# Patient Record
Sex: FEMALE | Race: BLACK OR AFRICAN AMERICAN | NOT HISPANIC OR LATINO | Employment: FULL TIME | URBAN - METROPOLITAN AREA
[De-identification: names, ages, dates, MRNs, and addresses within clinical notes are randomized per-mention and may not be internally consistent; named-entity substitution may affect disease eponyms.]

---

## 2018-02-14 ENCOUNTER — OFFICE VISIT (OUTPATIENT)
Dept: FAMILY MEDICINE | Facility: CLINIC | Age: 68
End: 2018-02-14
Payer: MEDICARE

## 2018-02-14 VITALS
SYSTOLIC BLOOD PRESSURE: 120 MMHG | HEIGHT: 67 IN | TEMPERATURE: 99 F | OXYGEN SATURATION: 95 % | BODY MASS INDEX: 24.43 KG/M2 | WEIGHT: 155.63 LBS | HEART RATE: 77 BPM | DIASTOLIC BLOOD PRESSURE: 80 MMHG

## 2018-02-14 DIAGNOSIS — J40 BRONCHITIS: Primary | ICD-10-CM

## 2018-02-14 PROCEDURE — 3008F BODY MASS INDEX DOCD: CPT | Mod: S$GLB,,, | Performed by: NURSE PRACTITIONER

## 2018-02-14 PROCEDURE — 99203 OFFICE O/P NEW LOW 30 MIN: CPT | Mod: S$GLB,,, | Performed by: NURSE PRACTITIONER

## 2018-02-14 RX ORDER — BUDESONIDE AND FORMOTEROL FUMARATE DIHYDRATE 80; 4.5 UG/1; UG/1
2 AEROSOL RESPIRATORY (INHALATION) 2 TIMES DAILY
Qty: 10.2 G | Refills: 0 | Status: SHIPPED | OUTPATIENT
Start: 2018-02-14 | End: 2018-07-03 | Stop reason: SDUPTHER

## 2018-02-14 RX ORDER — AZITHROMYCIN 250 MG/1
TABLET, FILM COATED ORAL
Qty: 6 TABLET | Refills: 0 | Status: SHIPPED | OUTPATIENT
Start: 2018-02-14 | End: 2018-02-19

## 2018-02-14 RX ORDER — ALBUTEROL SULFATE 90 UG/1
2 AEROSOL, METERED RESPIRATORY (INHALATION) EVERY 6 HOURS PRN
Qty: 18 G | Refills: 5 | Status: SHIPPED | OUTPATIENT
Start: 2018-02-14 | End: 2018-07-03 | Stop reason: SDUPTHER

## 2018-02-14 RX ORDER — PREDNISONE 20 MG/1
20 TABLET ORAL DAILY
Qty: 5 TABLET | Refills: 0 | Status: SHIPPED | OUTPATIENT
Start: 2018-02-14 | End: 2018-02-24

## 2018-02-14 NOTE — PROGRESS NOTES
Subjective:       Patient ID: Svetlana Beck is a 67 y.o. female.    Chief Complaint: Cough and Shortness of Breath    Cough   This is a new problem. The current episode started 1 to 4 weeks ago. The problem has been unchanged. The problem occurs every few minutes. Associated symptoms include shortness of breath and wheezing. Pertinent negatives include no chest pain, chills, ear congestion, ear pain, fever, headaches, heartburn, hemoptysis, myalgias, nasal congestion, postnasal drip, rash, rhinorrhea, sore throat, sweats or weight loss. Treatments tried: proair. The treatment provided no relief. Her past medical history is significant for COPD. There is no history of asthma, bronchiectasis, bronchitis, emphysema, environmental allergies or pneumonia.     Review of Systems   Constitutional: Negative for chills, diaphoresis, fatigue, fever and weight loss.   HENT: Negative for congestion, ear pain, postnasal drip, rhinorrhea and sore throat.    Respiratory: Positive for cough, shortness of breath and wheezing. Negative for hemoptysis and chest tightness.    Cardiovascular: Negative for chest pain, palpitations and leg swelling.   Gastrointestinal: Negative for heartburn.   Musculoskeletal: Negative for myalgias.   Skin: Negative for rash.   Allergic/Immunologic: Negative for environmental allergies.   Neurological: Negative for headaches.       Objective:      Physical Exam   Constitutional: She appears well-developed and well-nourished. No distress.   Cardiovascular: Normal rate, regular rhythm and normal heart sounds.    Pulmonary/Chest: Effort normal. She has wheezes in the right upper field, the right middle field, the right lower field, the left upper field, the left middle field and the left lower field. She has rhonchi in the right upper field, the right middle field, the right lower field, the left upper field, the left middle field and the left lower field.   SOB   Skin: Skin is warm and dry. She is not  diaphoretic.   Psychiatric: She has a normal mood and affect. Her behavior is normal. Judgment and thought content normal.   Vitals reviewed.      Assessment:       1. Bronchitis        Plan:       Bronchitis  -     budesonide-formoterol 80-4.5 mcg (SYMBICORT) 80-4.5 mcg/actuation HFAA; Inhale 2 puffs into the lungs 2 (two) times daily. Controller  Dispense: 10.2 g; Refill: 0  -     albuterol (PROAIR HFA) 90 mcg/actuation inhaler; Inhale 2 puffs into the lungs every 6 (six) hours as needed for Wheezing.  Dispense: 18 g; Refill: 5  -     predniSONE (DELTASONE) 20 MG tablet; Take 1 tablet (20 mg total) by mouth once daily.  Dispense: 5 tablet; Refill: 0    Other orders  -     azithromycin (Z-TICO) 250 MG tablet; Take 2 tablets by mouth on day 1; Take 1 tablet by mouth on days 2-5  Dispense: 6 tablet; Refill: 0      mucinex

## 2018-02-15 ENCOUNTER — TELEPHONE (OUTPATIENT)
Dept: FAMILY MEDICINE | Facility: CLINIC | Age: 68
End: 2018-02-15

## 2018-02-15 NOTE — TELEPHONE ENCOUNTER
I looked at the medicine I sent. It was Proair. Ventolin is another name for Proair it is probably what the pharmacy had in stock

## 2018-02-15 NOTE — TELEPHONE ENCOUNTER
----- Message from Cecilia Justin sent at 2/14/2018  4:27 PM CST -----  Contact: The pt came back  Per the pt, the medication that you sent to the pharmacy was not the correct one.  The Ventolin HFA is what was sent to the pharmacy.  SHOULD HAVE been Proair HFA.  Please give her a call in the morning at [phone]

## 2018-07-03 ENCOUNTER — OFFICE VISIT (OUTPATIENT)
Dept: FAMILY MEDICINE | Facility: CLINIC | Age: 68
End: 2018-07-03
Payer: MEDICARE

## 2018-07-03 VITALS
HEART RATE: 63 BPM | SYSTOLIC BLOOD PRESSURE: 130 MMHG | BODY MASS INDEX: 23.17 KG/M2 | WEIGHT: 147.63 LBS | OXYGEN SATURATION: 97 % | TEMPERATURE: 98 F | HEIGHT: 67 IN | DIASTOLIC BLOOD PRESSURE: 80 MMHG

## 2018-07-03 DIAGNOSIS — J40 BRONCHITIS: ICD-10-CM

## 2018-07-03 DIAGNOSIS — J44.9 CHRONIC OBSTRUCTIVE PULMONARY DISEASE, UNSPECIFIED COPD TYPE: Primary | ICD-10-CM

## 2018-07-03 PROCEDURE — 96372 THER/PROPH/DIAG INJ SC/IM: CPT | Mod: S$GLB,,, | Performed by: FAMILY MEDICINE

## 2018-07-03 PROCEDURE — 99499 UNLISTED E&M SERVICE: CPT | Mod: S$GLB,,, | Performed by: FAMILY MEDICINE

## 2018-07-03 PROCEDURE — 99213 OFFICE O/P EST LOW 20 MIN: CPT | Mod: 25,S$GLB,, | Performed by: FAMILY MEDICINE

## 2018-07-03 PROCEDURE — 3008F BODY MASS INDEX DOCD: CPT | Mod: CPTII,S$GLB,, | Performed by: FAMILY MEDICINE

## 2018-07-03 RX ORDER — ALBUTEROL SULFATE 90 UG/1
2 AEROSOL, METERED RESPIRATORY (INHALATION) EVERY 6 HOURS PRN
Qty: 18 G | Refills: 5 | Status: SHIPPED | OUTPATIENT
Start: 2018-07-03 | End: 2018-12-05 | Stop reason: SDUPTHER

## 2018-07-03 RX ORDER — BETAMETHASONE SODIUM PHOSPHATE AND BETAMETHASONE ACETATE 3; 3 MG/ML; MG/ML
6 INJECTION, SUSPENSION INTRA-ARTICULAR; INTRALESIONAL; INTRAMUSCULAR; SOFT TISSUE ONCE
Status: COMPLETED | OUTPATIENT
Start: 2018-07-03 | End: 2018-07-03

## 2018-07-03 RX ORDER — BUDESONIDE AND FORMOTEROL FUMARATE DIHYDRATE 80; 4.5 UG/1; UG/1
AEROSOL RESPIRATORY (INHALATION)
Qty: 6.9 G | Refills: 0 | Status: SHIPPED | OUTPATIENT
Start: 2018-07-03 | End: 2018-07-03 | Stop reason: SDUPTHER

## 2018-07-03 RX ORDER — DEXAMETHASONE SODIUM PHOSPHATE 4 MG/ML
4 INJECTION, SOLUTION INTRA-ARTICULAR; INTRALESIONAL; INTRAMUSCULAR; INTRAVENOUS; SOFT TISSUE
Status: DISCONTINUED | OUTPATIENT
Start: 2018-07-03 | End: 2018-07-03

## 2018-07-03 RX ORDER — BUDESONIDE AND FORMOTEROL FUMARATE DIHYDRATE 80; 4.5 UG/1; UG/1
2 AEROSOL RESPIRATORY (INHALATION) 2 TIMES DAILY
Qty: 6.9 G | Refills: 5 | Status: SHIPPED | OUTPATIENT
Start: 2018-07-03 | End: 2018-12-05 | Stop reason: SDUPTHER

## 2018-07-03 RX ADMIN — BETAMETHASONE SODIUM PHOSPHATE AND BETAMETHASONE ACETATE 6 MG: 3; 3 INJECTION, SUSPENSION INTRA-ARTICULAR; INTRALESIONAL; INTRAMUSCULAR; SOFT TISSUE at 12:07

## 2018-07-03 NOTE — PROGRESS NOTES
Subjective:       Patient ID: Svetlana Beck is a 67 y.o. female.    Chief Complaint:   Chief Complaint   Patient presents with    Shortness of Breath     pt stated she has been without her inhaler for about six days.        Shortness of Breath    This is a chronic problem. The current episode started in the past 7 days. The problem occurs constantly. The problem has been gradually worsening. Duration:  Ran out of her inhalers 2 days ago.  Pertinent negatives include no abdominal pain, chest pain, ear pain, headaches, leg swelling, neck pain, rash, rhinorrhea, sore throat, vomiting or wheezing. The symptoms are aggravated by exercise and fumes. Risk factors include smoking. She has tried beta agonist inhalers and steroid inhalers for the symptoms. The treatment provided significant relief. Her past medical history is significant for chronic lung disease and COPD. There is no history of allergies or aspirin allergies.     Review of Systems   Constitutional: Negative for activity change, appetite change, chills and fatigue.   HENT: Negative for congestion, ear discharge, ear pain, rhinorrhea, sinus pain, sore throat and trouble swallowing.    Eyes: Negative for photophobia, pain, redness, itching and visual disturbance.   Respiratory: Positive for shortness of breath. Negative for cough, chest tightness and wheezing.    Cardiovascular: Negative for chest pain, palpitations and leg swelling.   Gastrointestinal: Negative for abdominal distention, abdominal pain, blood in stool, diarrhea, nausea and vomiting.   Genitourinary: Negative for dysuria, pelvic pain, vaginal bleeding, vaginal discharge and vaginal pain.   Musculoskeletal: Negative for arthralgias, back pain, gait problem and neck pain.   Skin: Negative for color change, pallor and rash.   Neurological: Negative for dizziness, tremors, weakness, light-headedness, numbness and headaches.   Psychiatric/Behavioral: Negative for agitation, behavioral problems,  confusion and sleep disturbance.       Past Medical History:   Diagnosis Date    Asthma     COPD (chronic obstructive pulmonary disease)      Social History     Social History    Marital status: Single     Spouse name: N/A    Number of children: N/A    Years of education: N/A     Occupational History    Not on file.     Social History Main Topics    Smoking status: Current Every Day Smoker    Smokeless tobacco: Never Used    Alcohol use Yes    Drug use: Unknown    Sexual activity: Not on file     Other Topics Concern    Not on file     Social History Narrative    No narrative on file       Objective:      Physical Exam   Constitutional: She appears well-developed and well-nourished.   HENT:   Head: Normocephalic.   Right Ear: External ear normal.   Left Ear: External ear normal.   Mouth/Throat: Oropharynx is clear and moist.   Eyes: Conjunctivae and EOM are normal. Pupils are equal, round, and reactive to light.   Neck: Normal range of motion. Neck supple.   Cardiovascular: Normal rate, regular rhythm, normal heart sounds and intact distal pulses.    Pulmonary/Chest: Effort normal. She has decreased breath sounds. She has wheezes in the left lower field.   Neurological: She is alert.   Skin: Skin is warm and dry.   Psychiatric: Her behavior is normal. Judgment normal.       ASSESSMENT and PLAN    Chronic obstructive pulmonary disease, unspecified COPD type  -     Ambulatory referral to Smoking Cessation Program  -     betamethasone acetate-betamethasone sodium phosphate injection 6 mg; Inject 1 mL (6 mg total) into the muscle once.    Bronchitis  -     budesonide-formoterol 80-4.5 mcg (SYMBICORT) 80-4.5 mcg/actuation HFAA; Inhale 2 puffs into the lungs 2 (two) times daily. Controller  Dispense: 6.9 g; Refill: 5  -     albuterol (PROAIR HFA) 90 mcg/actuation inhaler; Inhale 2 puffs into the lungs every 6 (six) hours as needed for Wheezing.  Dispense: 18 g; Refill: 5  -     Ambulatory referral to Smoking  Cessation Program    Other orders  -     Discontinue: dexamethasone injection 4 mg; Inject 1 mL (4 mg total) into the vein one time.

## 2018-07-09 ENCOUNTER — OFFICE VISIT (OUTPATIENT)
Dept: FAMILY MEDICINE | Facility: CLINIC | Age: 68
End: 2018-07-09
Payer: MEDICARE

## 2018-07-09 VITALS
OXYGEN SATURATION: 96 % | HEART RATE: 69 BPM | HEIGHT: 67 IN | WEIGHT: 146.19 LBS | DIASTOLIC BLOOD PRESSURE: 84 MMHG | TEMPERATURE: 99 F | SYSTOLIC BLOOD PRESSURE: 132 MMHG | BODY MASS INDEX: 22.94 KG/M2

## 2018-07-09 DIAGNOSIS — J40 BRONCHITIS: ICD-10-CM

## 2018-07-09 DIAGNOSIS — R05.9 COUGH: ICD-10-CM

## 2018-07-09 DIAGNOSIS — J44.9 CHRONIC OBSTRUCTIVE PULMONARY DISEASE, UNSPECIFIED COPD TYPE: Primary | ICD-10-CM

## 2018-07-09 PROCEDURE — 94640 AIRWAY INHALATION TREATMENT: CPT | Mod: S$GLB,,, | Performed by: FAMILY MEDICINE

## 2018-07-09 PROCEDURE — 3008F BODY MASS INDEX DOCD: CPT | Mod: CPTII,S$GLB,, | Performed by: FAMILY MEDICINE

## 2018-07-09 PROCEDURE — 99213 OFFICE O/P EST LOW 20 MIN: CPT | Mod: 25,S$GLB,, | Performed by: FAMILY MEDICINE

## 2018-07-09 RX ORDER — LEVOFLOXACIN 750 MG/1
750 TABLET ORAL DAILY
Qty: 5 TABLET | Refills: 0 | Status: SHIPPED | OUTPATIENT
Start: 2018-07-09 | End: 2018-07-13 | Stop reason: ALTCHOICE

## 2018-07-09 RX ORDER — IPRATROPIUM BROMIDE AND ALBUTEROL SULFATE 2.5; .5 MG/3ML; MG/3ML
3 SOLUTION RESPIRATORY (INHALATION)
Status: DISCONTINUED | OUTPATIENT
Start: 2018-07-09 | End: 2018-07-09

## 2018-07-09 RX ORDER — ALBUTEROL SULFATE 0.83 MG/ML
2.5 SOLUTION RESPIRATORY (INHALATION)
Status: COMPLETED | OUTPATIENT
Start: 2018-07-09 | End: 2018-07-09

## 2018-07-09 RX ORDER — IPRATROPIUM BROMIDE 0.5 MG/2.5ML
0.5 SOLUTION RESPIRATORY (INHALATION)
Status: DISCONTINUED | OUTPATIENT
Start: 2018-07-09 | End: 2018-07-09

## 2018-07-09 RX ADMIN — IPRATROPIUM BROMIDE 0.5 MG: 0.5 SOLUTION RESPIRATORY (INHALATION) at 12:07

## 2018-07-09 RX ADMIN — ALBUTEROL SULFATE 2.5 MG: 0.83 SOLUTION RESPIRATORY (INHALATION) at 12:07

## 2018-07-09 NOTE — PROGRESS NOTES
Subjective:       Patient ID: Svetlana Beck is a 67 y.o. female.    Chief Complaint: No chief complaint on file.    Cough   This is a new problem. The current episode started 1 to 4 weeks ago. The problem has been unchanged (Was treated with inhalers, but has not improved. ). The problem occurs constantly (Unable to go to work. ). The cough is non-productive. Associated symptoms include nasal congestion and shortness of breath. Pertinent negatives include no chest pain, chills, ear pain, eye redness, headaches, rash, rhinorrhea, sore throat or wheezing. Nothing aggravates the symptoms. Risk factors for lung disease include smoking/tobacco exposure. She has tried a beta-agonist inhaler, steroid inhaler and oral steroids for the symptoms. The treatment provided no relief. Her past medical history is significant for COPD.     Review of Systems   Constitutional: Negative for activity change, appetite change, chills and fatigue.   HENT: Negative for congestion, ear discharge, ear pain, rhinorrhea, sinus pain, sore throat and trouble swallowing.    Eyes: Negative for photophobia, pain, redness, itching and visual disturbance.   Respiratory: Positive for cough and shortness of breath. Negative for chest tightness and wheezing.    Cardiovascular: Negative for chest pain, palpitations and leg swelling.   Gastrointestinal: Negative for abdominal distention, abdominal pain, blood in stool, diarrhea, nausea and vomiting.   Genitourinary: Negative for dysuria, pelvic pain, vaginal bleeding, vaginal discharge and vaginal pain.   Musculoskeletal: Negative for arthralgias, back pain, gait problem and neck pain.   Skin: Negative for color change, pallor and rash.   Neurological: Negative for dizziness, tremors, weakness, light-headedness, numbness and headaches.   Psychiatric/Behavioral: Negative for agitation, behavioral problems, confusion and sleep disturbance.       Past Medical History:   Diagnosis Date    Asthma     COPD  (chronic obstructive pulmonary disease)      Social History     Social History    Marital status: Single     Spouse name: N/A    Number of children: N/A    Years of education: N/A     Occupational History    Not on file.     Social History Main Topics    Smoking status: Current Every Day Smoker    Smokeless tobacco: Never Used    Alcohol use Yes    Drug use: Unknown    Sexual activity: Not on file     Other Topics Concern    Not on file     Social History Narrative    No narrative on file       Objective:      Physical Exam   Constitutional: She appears well-developed and well-nourished.   HENT:   Head: Normocephalic.   Mouth/Throat: Oropharynx is clear and moist.   Eyes: Conjunctivae and EOM are normal.   Neck: Normal range of motion. Neck supple.   Cardiovascular: Normal rate, regular rhythm, normal heart sounds and intact distal pulses.    Pulmonary/Chest: Effort normal. She has wheezes. She has rales.   Neurological: She is alert.   Skin: Skin is warm and dry.   Psychiatric: Her behavior is normal. Judgment normal.       Assessment:       Chronic obstructive pulmonary disease, unspecified COPD type  -     X-Ray Chest PA And Lateral; Future; Expected date: 07/09/2018    Cough  -     X-Ray Chest PA And Lateral; Future; Expected date: 07/09/2018    Other orders  -     levoFLOXacin (LEVAQUIN) 750 MG tablet; Take 1 tablet (750 mg total) by mouth once daily.  Dispense: 5 tablet; Refill: 0  -     albuterol-ipratropium 2.5 mg-0.5 mg/3 mL nebulizer solution 3 mL; Take 3 mLs by nebulization one time.

## 2018-07-11 ENCOUNTER — HOSPITAL ENCOUNTER (OUTPATIENT)
Dept: RADIOLOGY | Facility: HOSPITAL | Age: 68
Discharge: HOME | End: 2018-07-11
Attending: FAMILY MEDICINE
Payer: MEDICARE

## 2018-07-11 DIAGNOSIS — J44.9 CHRONIC OBSTRUCTIVE PULMONARY DISEASE, UNSPECIFIED COPD TYPE: ICD-10-CM

## 2018-07-11 DIAGNOSIS — R05.9 COUGH: ICD-10-CM

## 2018-07-11 PROCEDURE — 71046 X-RAY EXAM CHEST 2 VIEWS: CPT | Mod: TC,FY,PO

## 2018-07-13 ENCOUNTER — OFFICE VISIT (OUTPATIENT)
Dept: FAMILY MEDICINE | Facility: CLINIC | Age: 68
End: 2018-07-13
Payer: MEDICARE

## 2018-07-13 VITALS
DIASTOLIC BLOOD PRESSURE: 70 MMHG | WEIGHT: 143.19 LBS | HEIGHT: 67 IN | TEMPERATURE: 98 F | HEART RATE: 66 BPM | SYSTOLIC BLOOD PRESSURE: 122 MMHG | OXYGEN SATURATION: 98 % | BODY MASS INDEX: 22.47 KG/M2

## 2018-07-13 DIAGNOSIS — J44.9 CHRONIC OBSTRUCTIVE PULMONARY DISEASE, UNSPECIFIED COPD TYPE: Primary | ICD-10-CM

## 2018-07-13 DIAGNOSIS — R05.9 COUGH: ICD-10-CM

## 2018-07-13 PROCEDURE — 3008F BODY MASS INDEX DOCD: CPT | Mod: CPTII,S$GLB,, | Performed by: FAMILY MEDICINE

## 2018-07-13 PROCEDURE — 99213 OFFICE O/P EST LOW 20 MIN: CPT | Mod: S$GLB,,, | Performed by: FAMILY MEDICINE

## 2018-07-13 RX ORDER — FLUTICASONE PROPIONATE 50 MCG
1 SPRAY, SUSPENSION (ML) NASAL DAILY
Qty: 15.8 G | Refills: 3 | Status: SHIPPED | OUTPATIENT
Start: 2018-07-13 | End: 2023-06-21

## 2018-07-13 RX ORDER — GUAIFENESIN 600 MG/1
1200 TABLET, EXTENDED RELEASE ORAL 2 TIMES DAILY
Qty: 28 TABLET | Refills: 0 | COMMUNITY
Start: 2018-07-13 | End: 2022-05-25

## 2018-07-13 RX ORDER — FLUTICASONE PROPIONATE 50 MCG
1 SPRAY, SUSPENSION (ML) NASAL DAILY
Qty: 15.8 G | Refills: 3 | Status: SHIPPED | OUTPATIENT
Start: 2018-07-13 | End: 2018-07-13 | Stop reason: SDUPTHER

## 2018-07-13 RX ORDER — GUAIFENESIN 600 MG/1
1200 TABLET, EXTENDED RELEASE ORAL 2 TIMES DAILY
Qty: 28 TABLET | Refills: 0 | COMMUNITY
Start: 2018-07-13 | End: 2018-07-13 | Stop reason: SDUPTHER

## 2018-07-13 NOTE — PROGRESS NOTES
Subjective:       Patient ID: Svetlana Beck is a 67 y.o. female.    Chief Complaint: Cough; Shortness of Breath; and Medication Problem (Levaquin causing shaking, musle cramps)    Cough   This is a new (Patient reports she quit smoking 5 days ago) problem. The current episode started more than 1 month ago. The problem has been gradually improving (Shaking with the albuterol usage.  Still SOB with exertion). The problem occurs constantly (Unable to go to work. ). The cough is non-productive. Associated symptoms include nasal congestion, postnasal drip and shortness of breath. Pertinent negatives include no chest pain, chills, ear pain, eye redness, headaches, rash, rhinorrhea, sore throat or wheezing. Nothing aggravates the symptoms. Risk factors for lung disease include smoking/tobacco exposure. She has tried a beta-agonist inhaler, steroid inhaler and oral steroids for the symptoms. The treatment provided no relief. Her past medical history is significant for COPD.   Shortness of Breath   Pertinent negatives include no abdominal pain, chest pain, ear pain, headaches, leg swelling, neck pain, rash, rhinorrhea, sore throat, vomiting or wheezing. Her past medical history is significant for COPD.     Review of Systems   Constitutional: Negative for activity change, appetite change, chills and fatigue.   HENT: Positive for postnasal drip. Negative for congestion, ear discharge, ear pain, rhinorrhea, sinus pain, sore throat and trouble swallowing.    Eyes: Negative for photophobia, pain, redness, itching and visual disturbance.   Respiratory: Positive for cough and shortness of breath. Negative for chest tightness and wheezing.    Cardiovascular: Negative for chest pain, palpitations and leg swelling.   Gastrointestinal: Negative for abdominal distention, abdominal pain, blood in stool, diarrhea, nausea and vomiting.   Genitourinary: Negative for dysuria, pelvic pain, vaginal bleeding, vaginal discharge and vaginal  pain.   Musculoskeletal: Negative for arthralgias, back pain, gait problem and neck pain.   Skin: Negative for color change, pallor and rash.   Neurological: Negative for dizziness, tremors, weakness, light-headedness, numbness and headaches.   Psychiatric/Behavioral: Negative for agitation, behavioral problems, confusion and sleep disturbance.       Past Medical History:   Diagnosis Date    Asthma     COPD (chronic obstructive pulmonary disease)      Social History     Social History    Marital status: Single     Spouse name: N/A    Number of children: N/A    Years of education: N/A     Occupational History    Not on file.     Social History Main Topics    Smoking status: Current Every Day Smoker    Smokeless tobacco: Never Used    Alcohol use Yes    Drug use: Unknown    Sexual activity: Not on file     Other Topics Concern    Not on file     Social History Narrative    No narrative on file       Objective:      Physical Exam   Constitutional: She appears well-developed and well-nourished.   Looks better today.  Less accessory muscle usage.    HENT:   Head: Normocephalic.   Mouth/Throat: Oropharynx is clear and moist.   Eyes: Conjunctivae and EOM are normal.   Neck: Normal range of motion. Neck supple.   Cardiovascular: Normal rate, regular rhythm, normal heart sounds and intact distal pulses.    Pulmonary/Chest: Effort normal. No respiratory distress. She has no wheezes. She has rales.   Neurological: She is alert.   Skin: Skin is warm and dry.   Psychiatric: Her behavior is normal. Judgment normal.       Assessment:       Chronic obstructive pulmonary disease, unspecified COPD type    -     Discontinue: fluticasone (FLONASE) 50 mcg/actuation nasal spray; 1 spray (50 mcg total) by Each Nare route once daily.  Dispense: 15.8 g; Refill: 3  -     Discontinue: guaiFENesin (MUCINEX) 600 mg 12 hr tablet; Take 2 tablets (1,200 mg total) by mouth 2 (two) times daily.  Dispense: 28 tablet; Refill: 0  -      fluticasone (FLONASE) 50 mcg/actuation nasal spray; 1 spray (50 mcg total) by Each Nare route once daily.  Dispense: 15.8 g; Refill: 3  -     guaiFENesin (MUCINEX) 600 mg 12 hr tablet; Take 2 tablets (1,200 mg total) by mouth 2 (two) times daily.  Dispense: 28 tablet; Refill: 0  - Continue with inhalers for now.  Add some mucinex and fluticasone to stop the post nasal drip

## 2018-08-02 DIAGNOSIS — J40 BRONCHITIS: ICD-10-CM

## 2018-08-02 RX ORDER — BUDESONIDE AND FORMOTEROL FUMARATE DIHYDRATE 80; 4.5 UG/1; UG/1
AEROSOL RESPIRATORY (INHALATION)
Qty: 6.2 G | Refills: 2 | Status: SHIPPED | OUTPATIENT
Start: 2018-08-02 | End: 2018-12-05 | Stop reason: SDUPTHER

## 2018-08-02 RX ORDER — BUDESONIDE AND FORMOTEROL FUMARATE DIHYDRATE 80; 4.5 UG/1; UG/1
AEROSOL RESPIRATORY (INHALATION)
Refills: 0 | OUTPATIENT
Start: 2018-08-02

## 2018-12-05 ENCOUNTER — OFFICE VISIT (OUTPATIENT)
Dept: FAMILY MEDICINE | Facility: CLINIC | Age: 68
End: 2018-12-05
Payer: MEDICARE

## 2018-12-05 VITALS
DIASTOLIC BLOOD PRESSURE: 78 MMHG | HEIGHT: 67 IN | WEIGHT: 155.44 LBS | TEMPERATURE: 98 F | OXYGEN SATURATION: 99 % | SYSTOLIC BLOOD PRESSURE: 134 MMHG | HEART RATE: 54 BPM | BODY MASS INDEX: 24.4 KG/M2

## 2018-12-05 DIAGNOSIS — Z11.59 ENCOUNTER FOR HEPATITIS C SCREENING TEST FOR LOW RISK PATIENT: ICD-10-CM

## 2018-12-05 DIAGNOSIS — Z78.0 POSTMENOPAUSAL: ICD-10-CM

## 2018-12-05 DIAGNOSIS — Z12.39 BREAST CANCER SCREENING: ICD-10-CM

## 2018-12-05 DIAGNOSIS — J40 BRONCHITIS: ICD-10-CM

## 2018-12-05 DIAGNOSIS — J44.9 CHRONIC OBSTRUCTIVE PULMONARY DISEASE, UNSPECIFIED COPD TYPE: Primary | ICD-10-CM

## 2018-12-05 DIAGNOSIS — Z13.220 LIPID SCREENING: ICD-10-CM

## 2018-12-05 PROCEDURE — 1101F PT FALLS ASSESS-DOCD LE1/YR: CPT | Mod: CPTII,S$GLB,, | Performed by: FAMILY MEDICINE

## 2018-12-05 PROCEDURE — 99214 OFFICE O/P EST MOD 30 MIN: CPT | Mod: 25,S$GLB,, | Performed by: FAMILY MEDICINE

## 2018-12-05 PROCEDURE — 96372 THER/PROPH/DIAG INJ SC/IM: CPT | Mod: S$GLB,,, | Performed by: FAMILY MEDICINE

## 2018-12-05 RX ORDER — BETAMETHASONE SODIUM PHOSPHATE AND BETAMETHASONE ACETATE 3; 3 MG/ML; MG/ML
6 INJECTION, SUSPENSION INTRA-ARTICULAR; INTRALESIONAL; INTRAMUSCULAR; SOFT TISSUE
Status: COMPLETED | OUTPATIENT
Start: 2018-12-05 | End: 2018-12-05

## 2018-12-05 RX ORDER — ALBUTEROL SULFATE 90 UG/1
2 AEROSOL, METERED RESPIRATORY (INHALATION) EVERY 6 HOURS PRN
Qty: 18 G | Refills: 5 | Status: SHIPPED | OUTPATIENT
Start: 2018-12-05 | End: 2019-10-25 | Stop reason: SDUPTHER

## 2018-12-05 RX ORDER — BUDESONIDE AND FORMOTEROL FUMARATE DIHYDRATE 80; 4.5 UG/1; UG/1
2 AEROSOL RESPIRATORY (INHALATION) 2 TIMES DAILY
Qty: 6.9 G | Refills: 5 | Status: SHIPPED | OUTPATIENT
Start: 2018-12-05 | End: 2019-10-25 | Stop reason: SDUPTHER

## 2018-12-05 RX ORDER — AZITHROMYCIN 250 MG/1
TABLET, FILM COATED ORAL
Qty: 6 TABLET | Refills: 0 | Status: SHIPPED | OUTPATIENT
Start: 2018-12-05 | End: 2018-12-10

## 2018-12-05 RX ADMIN — BETAMETHASONE SODIUM PHOSPHATE AND BETAMETHASONE ACETATE 6 MG: 3; 3 INJECTION, SUSPENSION INTRA-ARTICULAR; INTRALESIONAL; INTRAMUSCULAR; SOFT TISSUE at 09:12

## 2018-12-05 NOTE — PROGRESS NOTES
Subjective:       Patient ID: Svetlana Beck is a 68 y.o. female.    Chief Complaint:   Chief Complaint   Patient presents with    URI       Shortness of Breath   This is a new problem. The current episode started 1 to 4 weeks ago. The problem occurs constantly. The problem has been gradually improving. Associated symptoms include rhinorrhea. Pertinent negatives include no abdominal pain, chest pain, claudication, coryza, ear pain, fever, headaches, hemoptysis, leg pain, leg swelling, neck pain, orthopnea, PND, rash, sore throat, sputum production, swollen glands, syncope, vomiting or wheezing. The symptoms are aggravated by URIs. Associated symptoms comments: Nasal congestion and cough for about 10 days. . The patient has no known risk factors for DVT/PE. She has tried beta agonist inhalers and steroid inhalers for the symptoms. The treatment provided mild relief. Her past medical history is significant for asthma, chronic lung disease and COPD. There is no history of allergies, aspirin allergies, bronchiolitis, CAD, DVT, a heart failure, PE, pneumonia or a recent surgery.     Review of Systems   Constitutional: Negative for activity change, appetite change, chills, fatigue and fever.   HENT: Positive for congestion, postnasal drip and rhinorrhea. Negative for ear discharge, ear pain, sinus pain, sore throat and trouble swallowing.    Eyes: Negative for photophobia, pain, redness, itching and visual disturbance.   Respiratory: Positive for cough and shortness of breath. Negative for hemoptysis, sputum production, chest tightness and wheezing.    Cardiovascular: Negative for chest pain, palpitations, orthopnea, claudication, leg swelling, syncope and PND.   Gastrointestinal: Negative for abdominal distention, abdominal pain, blood in stool, diarrhea, nausea and vomiting.   Genitourinary: Negative for dysuria, pelvic pain, vaginal bleeding, vaginal discharge and vaginal pain.   Musculoskeletal: Negative for  "arthralgias, back pain, gait problem and neck pain.   Skin: Negative for color change, pallor and rash.   Neurological: Negative for dizziness, tremors, weakness, light-headedness, numbness and headaches.   Psychiatric/Behavioral: Negative for agitation, behavioral problems, confusion and sleep disturbance.       Past Medical History:   Diagnosis Date    Asthma     COPD (chronic obstructive pulmonary disease)      Social History     Socioeconomic History    Marital status: Single     Spouse name: Not on file    Number of children: Not on file    Years of education: Not on file    Highest education level: Not on file   Social Needs    Financial resource strain: Not on file    Food insecurity - worry: Not on file    Food insecurity - inability: Not on file    Transportation needs - medical: Not on file    Transportation needs - non-medical: Not on file   Occupational History    Not on file   Tobacco Use    Smoking status: Current Every Day Smoker    Smokeless tobacco: Never Used   Substance and Sexual Activity    Alcohol use: Yes    Drug use: Not on file    Sexual activity: Not on file   Other Topics Concern    Not on file   Social History Narrative    Not on file       Objective:     /78 (BP Location: Right arm, Patient Position: Sitting)   Pulse (!) 54   Temp 98 °F (36.7 °C) (Oral)   Ht 5' 7" (1.702 m)   Wt 70.5 kg (155 lb 6.8 oz)   SpO2 99%   BMI 24.34 kg/m²     Physical Exam   Constitutional: She appears well-developed and well-nourished.   HENT:   Head: Normocephalic.   Right Ear: Tympanic membrane, external ear and ear canal normal.   Left Ear: Tympanic membrane, external ear and ear canal normal.   Nose: Mucosal edema and rhinorrhea present.   Mouth/Throat: Posterior oropharyngeal erythema present.   Eyes: Conjunctivae are normal.   Neck: Normal range of motion. Neck supple.   Cardiovascular: Normal rate, regular rhythm and normal heart sounds.   Pulmonary/Chest: Effort normal and " breath sounds normal.   Neurological: She is alert.   Skin: Skin is warm and dry.   Psychiatric: Her behavior is normal.       Assessment:       1. Chronic obstructive pulmonary disease, unspecified COPD type        Plan:       Chronic obstructive pulmonary disease, unspecified COPD type  -     budesonide-formoterol 80-4.5 mcg (SYMBICORT) 80-4.5 mcg/actuation HFAA; Inhale 2 puffs into the lungs 2 (two) times daily. Controller  Dispense: 6.9 g; Refill: 5  -     albuterol (PROAIR HFA) 90 mcg/actuation inhaler; Inhale 2 puffs into the lungs every 6 (six) hours as needed for Wheezing.  Dispense: 18 g; Refill: 5  -     azithromycin (Z-TICO) 250 MG tablet; Take 2 tablets by mouth on day 1; Take 1 tablet by mouth on days 2-5  Dispense: 6 tablet; Refill: 0  -     betamethasone acetate-betamethasone sodium phosphate injection 6 mg    Bronchitis    Encounter for hepatitis C screening test for low risk patient  -     Hepatitis C antibody; Future; Expected date: 12/05/2018    Lipid screening    Breast cancer screening  -     Mammo Digital Screening Bilateral With CAD; Future; Expected date: 12/05/2018    Postmenopausal  -     DXA Bone Density Spine And Hip; Future; Expected date: 12/05/2018

## 2019-05-14 DIAGNOSIS — Z12.11 COLON CANCER SCREENING: ICD-10-CM

## 2019-10-25 ENCOUNTER — OFFICE VISIT (OUTPATIENT)
Dept: FAMILY MEDICINE | Facility: CLINIC | Age: 69
End: 2019-10-25
Payer: MEDICARE

## 2019-10-25 VITALS
SYSTOLIC BLOOD PRESSURE: 130 MMHG | BODY MASS INDEX: 25.9 KG/M2 | WEIGHT: 161.19 LBS | HEART RATE: 75 BPM | HEIGHT: 66 IN | TEMPERATURE: 98 F | OXYGEN SATURATION: 98 % | DIASTOLIC BLOOD PRESSURE: 80 MMHG

## 2019-10-25 DIAGNOSIS — J18.9 PNEUMONIA OF RIGHT LOWER LOBE DUE TO INFECTIOUS ORGANISM: Primary | ICD-10-CM

## 2019-10-25 DIAGNOSIS — Z13.220 ENCOUNTER FOR LIPID SCREENING FOR CARDIOVASCULAR DISEASE: ICD-10-CM

## 2019-10-25 DIAGNOSIS — Z12.11 COLON CANCER SCREENING: ICD-10-CM

## 2019-10-25 DIAGNOSIS — Z13.6 ENCOUNTER FOR LIPID SCREENING FOR CARDIOVASCULAR DISEASE: ICD-10-CM

## 2019-10-25 DIAGNOSIS — J44.9 CHRONIC OBSTRUCTIVE PULMONARY DISEASE, UNSPECIFIED COPD TYPE: ICD-10-CM

## 2019-10-25 DIAGNOSIS — Z72.0 TOBACCO USE: ICD-10-CM

## 2019-10-25 PROCEDURE — 96372 THER/PROPH/DIAG INJ SC/IM: CPT | Mod: S$GLB,,, | Performed by: FAMILY MEDICINE

## 2019-10-25 PROCEDURE — 99499 RISK ADDL DX/OHS AUDIT: ICD-10-PCS | Mod: S$GLB,,, | Performed by: FAMILY MEDICINE

## 2019-10-25 PROCEDURE — 1101F PR PT FALLS ASSESS DOC 0-1 FALLS W/OUT INJ PAST YR: ICD-10-PCS | Mod: CPTII,S$GLB,, | Performed by: FAMILY MEDICINE

## 2019-10-25 PROCEDURE — 99214 OFFICE O/P EST MOD 30 MIN: CPT | Mod: 25,S$GLB,, | Performed by: FAMILY MEDICINE

## 2019-10-25 PROCEDURE — 96372 PR INJECTION,THERAP/PROPH/DIAG2ST, IM OR SUBCUT: ICD-10-PCS | Mod: S$GLB,,, | Performed by: FAMILY MEDICINE

## 2019-10-25 PROCEDURE — 99214 PR OFFICE/OUTPT VISIT, EST, LEVL IV, 30-39 MIN: ICD-10-PCS | Mod: 25,S$GLB,, | Performed by: FAMILY MEDICINE

## 2019-10-25 PROCEDURE — 1101F PT FALLS ASSESS-DOCD LE1/YR: CPT | Mod: CPTII,S$GLB,, | Performed by: FAMILY MEDICINE

## 2019-10-25 PROCEDURE — 99499 UNLISTED E&M SERVICE: CPT | Mod: S$GLB,,, | Performed by: FAMILY MEDICINE

## 2019-10-25 RX ORDER — BUDESONIDE AND FORMOTEROL FUMARATE DIHYDRATE 80; 4.5 UG/1; UG/1
2 AEROSOL RESPIRATORY (INHALATION) 2 TIMES DAILY
Qty: 6.9 G | Refills: 5 | Status: SHIPPED | OUTPATIENT
Start: 2019-10-25 | End: 2020-08-07 | Stop reason: SDUPTHER

## 2019-10-25 RX ORDER — ALBUTEROL SULFATE 90 UG/1
2 AEROSOL, METERED RESPIRATORY (INHALATION) EVERY 6 HOURS PRN
Qty: 18 G | Refills: 5 | Status: SHIPPED | OUTPATIENT
Start: 2019-10-25 | End: 2019-10-31 | Stop reason: ALTCHOICE

## 2019-10-25 RX ORDER — LEVOFLOXACIN 750 MG/1
750 TABLET ORAL DAILY
Qty: 5 TABLET | Refills: 0 | Status: SHIPPED | OUTPATIENT
Start: 2019-10-25 | End: 2019-10-30

## 2019-10-25 RX ORDER — BETAMETHASONE SODIUM PHOSPHATE AND BETAMETHASONE ACETATE 3; 3 MG/ML; MG/ML
9 INJECTION, SUSPENSION INTRA-ARTICULAR; INTRALESIONAL; INTRAMUSCULAR; SOFT TISSUE ONCE
Status: COMPLETED | OUTPATIENT
Start: 2019-10-25 | End: 2019-10-25

## 2019-10-25 RX ADMIN — BETAMETHASONE SODIUM PHOSPHATE AND BETAMETHASONE ACETATE 9 MG: 3; 3 INJECTION, SUSPENSION INTRA-ARTICULAR; INTRALESIONAL; INTRAMUSCULAR; SOFT TISSUE at 01:10

## 2019-10-25 NOTE — PROGRESS NOTES
Chief Complaint  Chief Complaint   Patient presents with    Cough    Shortness of Breath       HPI  Svetlana Beck is a 69 y.o. female with multiple medical diagnoses as listed in the medical history and problem list that presents for COPD exacerbation.  About 3 days ago noticed increase SOB.  Unable to walk more oliver 20 ft without stopping to rest.  Coughing up white sputum.  Wheezing.  She is using her inhalers and the albuterol gives some relief. She continues to smoke every day, but says she is cutting back.  No fevers.  No nasal congestion.  No nausea.       PAST MEDICAL HISTORY:  Past Medical History:   Diagnosis Date    Asthma     COPD (chronic obstructive pulmonary disease)        PAST SURGICAL HISTORY:  History reviewed. No pertinent surgical history.    SOCIAL HISTORY:  Social History     Socioeconomic History    Marital status: Single     Spouse name: Not on file    Number of children: Not on file    Years of education: Not on file    Highest education level: Not on file   Occupational History    Not on file   Social Needs    Financial resource strain: Not on file    Food insecurity:     Worry: Not on file     Inability: Not on file    Transportation needs:     Medical: Not on file     Non-medical: Not on file   Tobacco Use    Smoking status: Current Every Day Smoker    Smokeless tobacco: Never Used   Substance and Sexual Activity    Alcohol use: Yes    Drug use: Not on file    Sexual activity: Not on file   Lifestyle    Physical activity:     Days per week: Not on file     Minutes per session: Not on file    Stress: Not at all   Relationships    Social connections:     Talks on phone: Not on file     Gets together: Not on file     Attends Nondenominational service: Not on file     Active member of club or organization: Not on file     Attends meetings of clubs or organizations: Not on file     Relationship status: Not on file   Other Topics Concern    Not on file   Social History Narrative     Not on file       FAMILY HISTORY:  History reviewed. No pertinent family history.    ALLERGIES AND MEDICATIONS: updated and reviewed.  Review of patient's allergies indicates:   Allergen Reactions    Penicillins Other (See Comments)     Current Outpatient Medications   Medication Sig Dispense Refill    albuterol (PROAIR HFA) 90 mcg/actuation inhaler Inhale 2 puffs into the lungs every 6 (six) hours as needed for Wheezing. 18 g 5    budesonide-formoterol 80-4.5 mcg (SYMBICORT) 80-4.5 mcg/actuation HFAA Inhale 2 puffs into the lungs 2 (two) times daily. Controller 6.9 g 5    fluticasone (FLONASE) 50 mcg/actuation nasal spray 1 spray (50 mcg total) by Each Nare route once daily. 15.8 g 3    guaiFENesin (MUCINEX) 600 mg 12 hr tablet Take 2 tablets (1,200 mg total) by mouth 2 (two) times daily. 28 tablet 0    levoFLOXacin (LEVAQUIN) 750 MG tablet Take 1 tablet (750 mg total) by mouth once daily. for 5 days 5 tablet 0     Current Facility-Administered Medications   Medication Dose Route Frequency Provider Last Rate Last Dose    betamethasone acetate-betamethasone sodium phosphate injection 9 mg  9 mg Intramuscular Once Elida Lara, DO SAUER  Review of Systems   Constitutional: Negative for activity change, appetite change, chills and fatigue.   HENT: Negative for congestion, ear discharge, ear pain, rhinorrhea, sinus pain, sore throat and trouble swallowing.    Eyes: Negative for photophobia, pain, redness, itching and visual disturbance.   Respiratory: Positive for cough, chest tightness, shortness of breath and wheezing.    Cardiovascular: Negative for chest pain, palpitations and leg swelling.   Gastrointestinal: Negative for abdominal distention, abdominal pain, blood in stool, diarrhea, nausea and vomiting.   Genitourinary: Negative for dysuria, pelvic pain, vaginal bleeding, vaginal discharge and vaginal pain.   Musculoskeletal: Negative for arthralgias, back pain, gait problem and neck  "pain.   Skin: Negative for color change, pallor and rash.   Neurological: Negative for dizziness, tremors, weakness, light-headedness, numbness and headaches.   Psychiatric/Behavioral: Negative for agitation, behavioral problems, confusion and sleep disturbance.           PHYSICAL EXAM  Vitals:    10/25/19 1150   BP: 130/80   BP Location: Right arm   Patient Position: Sitting   Pulse: 75   Temp: 98.4 °F (36.9 °C)   TempSrc: Oral   SpO2: 98%   Weight: 73.1 kg (161 lb 2.5 oz)   Height: 5' 6" (1.676 m)    Body mass index is 26.01 kg/m².  Weight: 73.1 kg (161 lb 2.5 oz)   Height: 5' 6" (167.6 cm)     Physical Exam   Constitutional: She appears well-developed and well-nourished.   HENT:   Head: Normocephalic.   Eyes: Conjunctivae are normal.   Neck: Normal range of motion. Neck supple.   Cardiovascular: Normal rate, regular rhythm and normal heart sounds.   Pulmonary/Chest: Effort normal. She has no decreased breath sounds. She has wheezes. She has rales in the left lower field.   Neurological: She is alert.   Skin: Skin is warm and dry.   Psychiatric: Her behavior is normal.         Health Maintenance       Date Due Completion Date    Hepatitis C Screening 1950 ---    Lipid Panel 1950 ---    Mammogram 10/10/1992 ---    DEXA SCAN 10/10/1992 ---    Pneumococcal Vaccine (65+ Low/Medium Risk) (1 of 2 - PCV13) 10/10/2017 ---    Influenza Vaccine (1) 11/29/2019 (Originally 9/1/2019) ---    TETANUS VACCINE 10/25/2020 (Originally 10/10/1970) ---    Shingles Vaccine (1 of 2) 10/25/2020 (Originally 10/10/2002) ---    Colonoscopy 10/25/2020 (Originally 10/10/2002) ---            Assessment & Plan      Svetlana was seen today for cough and shortness of breath.    Diagnoses and all orders for this visit:    Pneumonia of right lower lobe due to infectious organism  -     levoFLOXacin (LEVAQUIN) 750 MG tablet; Take 1 tablet (750 mg total) by mouth once daily. for 5 days  -     X-Ray Chest PA And Lateral; Future    Colon " cancer screening  -     Case request GI: COLONOSCOPY    Encounter for lipid screening for cardiovascular disease  -     Lipid panel; Future    Chronic obstructive pulmonary disease, unspecified COPD type  -     albuterol (PROAIR HFA) 90 mcg/actuation inhaler; Inhale 2 puffs into the lungs every 6 (six) hours as needed for Wheezing.  -     budesonide-formoterol 80-4.5 mcg (SYMBICORT) 80-4.5 mcg/actuation HFAA; Inhale 2 puffs into the lungs 2 (two) times daily. Controller  -     betamethasone acetate-betamethasone sodium phosphate injection 9 mg  -     CBC auto differential; Future  -     Comprehensive metabolic panel; Future    Tobacco use  -     Ambulatory referral to Smoking Cessation Program          Follow-up: No follow-ups on file.

## 2019-10-25 NOTE — LETTER
October 25, 2019      [address] [address]  Phone: [phone]  Fax: [phone]       Patient: Svetlana Beck   YOB: 1950  Date of Visit: 10/25/2019    To Whom It May Concern:    Keren Beck  was at [facility] on 10/25/2019. Please excuse her starting 10/24/2019.   She may return to work/school on 10/29/2019 with no restrictions. If you have any questions or concerns, or if I can be of further assistance, please do not hesitate to contact me.    Sincerely,    Elida Lara, DO

## 2019-10-28 ENCOUNTER — HOSPITAL ENCOUNTER (OUTPATIENT)
Dept: RADIOLOGY | Facility: HOSPITAL | Age: 69
Discharge: HOME | End: 2019-10-28
Attending: FAMILY MEDICINE
Payer: MEDICARE

## 2019-10-28 DIAGNOSIS — J18.9 PNEUMONIA OF RIGHT LOWER LOBE DUE TO INFECTIOUS ORGANISM: ICD-10-CM

## 2019-10-28 PROCEDURE — 71046 X-RAY EXAM CHEST 2 VIEWS: CPT | Mod: TC,FY,PO

## 2019-10-30 NOTE — PROGRESS NOTES
Chief Complaint  No chief complaint on file.      HPI  Svetlana Beck is a 69 y.o. female with multiple medical diagnoses as listed in the medical history and problem list that presents for COPD exacerbation.  About 7 days ago noticed increase SOB.  Unable to walk more oliver 20 ft without stopping to rest.  Coughing up white sputum.  Wheezing.  She is using her inhalers and the albuterol gives some relief. She has not smoked for 5 days now.  At her last appointment I though she had pneumonia and started treatment, but CXR was clear.  Today she reports no improvement in symptoms.     PAST MEDICAL HISTORY:  Past Medical History:   Diagnosis Date    Asthma     COPD (chronic obstructive pulmonary disease)        PAST SURGICAL HISTORY:  No past surgical history on file.    SOCIAL HISTORY:  Social History     Socioeconomic History    Marital status: Single     Spouse name: Not on file    Number of children: Not on file    Years of education: Not on file    Highest education level: Not on file   Occupational History    Not on file   Social Needs    Financial resource strain: Not on file    Food insecurity:     Worry: Not on file     Inability: Not on file    Transportation needs:     Medical: Not on file     Non-medical: Not on file   Tobacco Use    Smoking status: Current Every Day Smoker    Smokeless tobacco: Never Used   Substance and Sexual Activity    Alcohol use: Yes    Drug use: Not on file    Sexual activity: Not on file   Lifestyle    Physical activity:     Days per week: Not on file     Minutes per session: Not on file    Stress: Not at all   Relationships    Social connections:     Talks on phone: Not on file     Gets together: Not on file     Attends Latter-day service: Not on file     Active member of club or organization: Not on file     Attends meetings of clubs or organizations: Not on file     Relationship status: Not on file   Other Topics Concern    Not on file   Social History Narrative     Not on file       FAMILY HISTORY:  No family history on file.    ALLERGIES AND MEDICATIONS: updated and reviewed.  Review of patient's allergies indicates:   Allergen Reactions    Penicillins Other (See Comments)     Current Outpatient Medications   Medication Sig Dispense Refill    albuterol (PROAIR HFA) 90 mcg/actuation inhaler Inhale 2 puffs into the lungs every 6 (six) hours as needed for Wheezing. 18 g 5    budesonide-formoterol 80-4.5 mcg (SYMBICORT) 80-4.5 mcg/actuation HFAA Inhale 2 puffs into the lungs 2 (two) times daily. Controller 6.9 g 5    fluticasone (FLONASE) 50 mcg/actuation nasal spray 1 spray (50 mcg total) by Each Nare route once daily. 15.8 g 3    guaiFENesin (MUCINEX) 600 mg 12 hr tablet Take 2 tablets (1,200 mg total) by mouth 2 (two) times daily. 28 tablet 0    levoFLOXacin (LEVAQUIN) 750 MG tablet Take 1 tablet (750 mg total) by mouth once daily. for 5 days 5 tablet 0     No current facility-administered medications for this visit.          ROS  Review of Systems   Constitutional: Negative for activity change, appetite change, chills and fatigue.   HENT: Negative for congestion, ear discharge, ear pain, rhinorrhea, sinus pain, sore throat and trouble swallowing.    Eyes: Negative for photophobia, pain, redness, itching and visual disturbance.   Respiratory: Positive for cough, chest tightness, shortness of breath and wheezing.    Cardiovascular: Negative for chest pain, palpitations and leg swelling.   Gastrointestinal: Negative for abdominal distention, abdominal pain, blood in stool, diarrhea, nausea and vomiting.   Genitourinary: Negative for dysuria, pelvic pain, vaginal bleeding, vaginal discharge and vaginal pain.   Musculoskeletal: Negative for arthralgias, back pain, gait problem and neck pain.   Skin: Negative for color change, pallor and rash.   Neurological: Negative for dizziness, tremors, weakness, light-headedness, numbness and headaches.   Psychiatric/Behavioral:  "Negative for agitation, behavioral problems, confusion and sleep disturbance.           PHYSICAL EXAM    /62   Pulse 68   Temp 98.4 °F (36.9 °C)   Ht 5' 6" (1.676 m)   Wt 71.1 kg (156 lb 10.2 oz)   SpO2 97%   BMI 25.28 kg/m²           Physical Exam   Constitutional: She appears well-developed and well-nourished.   HENT:   Head: Normocephalic.   Eyes: Conjunctivae are normal.   Neck: Normal range of motion. Neck supple.   Cardiovascular: Normal rate, regular rhythm and normal heart sounds.   Pulmonary/Chest: Effort normal. She has no decreased breath sounds. She has wheezes. She has rales in the left lower field.   Neurological: She is alert.   Skin: Skin is warm and dry.   Psychiatric: Her behavior is normal.         Health Maintenance       Date Due Completion Date    Hepatitis C Screening 1950 ---    Lipid Panel 1950 ---    Mammogram 10/10/1992 ---    DEXA SCAN 10/10/1992 ---    Pneumococcal Vaccine (65+ Low/Medium Risk) (1 of 2 - PCV13) 10/10/2017 ---    Influenza Vaccine (1) 11/29/2019 (Originally 9/1/2019) ---    TETANUS VACCINE 10/25/2020 (Originally 10/10/1970) ---    Shingles Vaccine (1 of 2) 10/25/2020 (Originally 10/10/2002) ---    Colonoscopy 10/25/2020 (Originally 10/10/2002) ---            Assessment & Plan      COPD exacerbation  -     ipratropium-albuterol (COMBIVENT)  mcg/actuation inhaler; Inhale 1 puff into the lungs every 6 (six) hours as needed for Wheezing. Rescue  Dispense: 1 g; Refill: 3  -     methylPREDNISolone (MEDROL DOSEPACK) 4 mg tablet; use as directed  Dispense: 21 tablet; Refill: 0  -     montelukast (SINGULAIR) 10 mg tablet; Take 1 tablet (10 mg total) by mouth every evening.  Dispense: 30 tablet; Refill: 0  - She thinks she can borrow a nebulizer and medications from a neighbor that doesn't need it any more.  She may try this before she gets the new inhaler.              Follow-up: No follow-ups on file.    "

## 2019-10-31 ENCOUNTER — TELEPHONE (OUTPATIENT)
Dept: GASTROENTEROLOGY | Facility: CLINIC | Age: 69
End: 2019-10-31

## 2019-10-31 ENCOUNTER — OFFICE VISIT (OUTPATIENT)
Dept: FAMILY MEDICINE | Facility: CLINIC | Age: 69
End: 2019-10-31
Payer: MEDICARE

## 2019-10-31 VITALS
HEART RATE: 68 BPM | OXYGEN SATURATION: 97 % | WEIGHT: 156.63 LBS | TEMPERATURE: 98 F | DIASTOLIC BLOOD PRESSURE: 62 MMHG | BODY MASS INDEX: 25.17 KG/M2 | SYSTOLIC BLOOD PRESSURE: 128 MMHG | HEIGHT: 66 IN

## 2019-10-31 DIAGNOSIS — J44.1 COPD EXACERBATION: Primary | ICD-10-CM

## 2019-10-31 PROCEDURE — 99214 OFFICE O/P EST MOD 30 MIN: CPT | Mod: S$GLB,,, | Performed by: FAMILY MEDICINE

## 2019-10-31 PROCEDURE — 1101F PT FALLS ASSESS-DOCD LE1/YR: CPT | Mod: CPTII,S$GLB,, | Performed by: FAMILY MEDICINE

## 2019-10-31 PROCEDURE — 1101F PR PT FALLS ASSESS DOC 0-1 FALLS W/OUT INJ PAST YR: ICD-10-PCS | Mod: CPTII,S$GLB,, | Performed by: FAMILY MEDICINE

## 2019-10-31 PROCEDURE — 99499 RISK ADDL DX/OHS AUDIT: ICD-10-PCS | Mod: S$GLB,,, | Performed by: FAMILY MEDICINE

## 2019-10-31 PROCEDURE — 99214 PR OFFICE/OUTPT VISIT, EST, LEVL IV, 30-39 MIN: ICD-10-PCS | Mod: S$GLB,,, | Performed by: FAMILY MEDICINE

## 2019-10-31 PROCEDURE — 99499 UNLISTED E&M SERVICE: CPT | Mod: S$GLB,,, | Performed by: FAMILY MEDICINE

## 2019-10-31 RX ORDER — MONTELUKAST SODIUM 10 MG/1
10 TABLET ORAL NIGHTLY
Qty: 30 TABLET | Refills: 0 | Status: SHIPPED | OUTPATIENT
Start: 2019-10-31 | End: 2019-11-30

## 2019-10-31 RX ORDER — METHYLPREDNISOLONE 4 MG/1
TABLET ORAL
Qty: 21 TABLET | Refills: 0 | Status: SHIPPED | OUTPATIENT
Start: 2019-10-31 | End: 2019-11-21

## 2019-10-31 NOTE — LETTER
October 31, 2019      [address] [address]  Phone: [phone]  Fax: [phone]       Patient: Svetlana Beck   YOB: 1950  Date of Visit: 10/31/2019    To Whom It May Concern:    Keren Beck  was at [facility] on 10/31/2019. Please excuse her starting 10/30/2019.  She may return to work/school on 11/03/2019 with no restrictions. If you have any questions or concerns, or if I can be of further assistance, please do not hesitate to contact me.    Sincerely,    Elida Lara, DO

## 2019-11-08 ENCOUNTER — TELEPHONE (OUTPATIENT)
Dept: GASTROENTEROLOGY | Facility: CLINIC | Age: 69
End: 2019-11-08

## 2019-11-15 ENCOUNTER — TELEPHONE (OUTPATIENT)
Dept: GASTROENTEROLOGY | Facility: CLINIC | Age: 69
End: 2019-11-15

## 2019-12-06 ENCOUNTER — PATIENT OUTREACH (OUTPATIENT)
Dept: ADMINISTRATIVE | Facility: HOSPITAL | Age: 69
End: 2019-12-06

## 2019-12-26 ENCOUNTER — PATIENT OUTREACH (OUTPATIENT)
Dept: ADMINISTRATIVE | Facility: HOSPITAL | Age: 69
End: 2019-12-26

## 2020-03-04 DIAGNOSIS — Z12.39 BREAST CANCER SCREENING: ICD-10-CM

## 2020-04-16 DIAGNOSIS — J44.1 COPD EXACERBATION: ICD-10-CM

## 2020-04-16 RX ORDER — IPRATROPIUM BROMIDE AND ALBUTEROL 20; 100 UG/1; UG/1
SPRAY, METERED RESPIRATORY (INHALATION)
Qty: 4 G | Refills: 3 | Status: SHIPPED | OUTPATIENT
Start: 2020-04-16 | End: 2020-08-13 | Stop reason: SDUPTHER

## 2020-06-01 ENCOUNTER — PATIENT OUTREACH (OUTPATIENT)
Dept: ADMINISTRATIVE | Facility: HOSPITAL | Age: 70
End: 2020-06-01

## 2020-08-06 ENCOUNTER — TELEPHONE (OUTPATIENT)
Dept: INTERNAL MEDICINE | Facility: CLINIC | Age: 70
End: 2020-08-06

## 2020-08-06 NOTE — TELEPHONE ENCOUNTER
----- Message from Rubin Lebron sent at 8/6/2020  3:34 PM CDT -----  Type:  Needs Medical Advice    Who Called: self  Reason:Patient is trying to get budesonide-formoterol 80-4.5 mcg (SYMBICORT) 80-4.5 mcg/actuation HFAA refilled and would like to know what the hold up is. Stated to the patient that you may want her to make an appointment since its been awhile since she been in.  Would the patient rather a call back or a response via [facility]? callback  Best Call Back Number: [phone]  Additional Information: none

## 2020-08-06 NOTE — TELEPHONE ENCOUNTER
I called the patient to schedule her annual appointment. There was no answer. The appointment has been scheduled for August 13, 2020 at 2:00 pm. There was no answer a message was left for the patient to call the office.

## 2020-08-12 ENCOUNTER — PATIENT OUTREACH (OUTPATIENT)
Dept: ADMINISTRATIVE | Facility: HOSPITAL | Age: 70
End: 2020-08-12

## 2020-08-13 ENCOUNTER — PATIENT OUTREACH (OUTPATIENT)
Dept: ADMINISTRATIVE | Facility: HOSPITAL | Age: 70
End: 2020-08-13

## 2020-08-13 ENCOUNTER — OFFICE VISIT (OUTPATIENT)
Dept: FAMILY MEDICINE | Facility: CLINIC | Age: 70
End: 2020-08-13
Payer: MEDICARE

## 2020-08-13 VITALS
TEMPERATURE: 97 F | HEART RATE: 68 BPM | HEIGHT: 66 IN | DIASTOLIC BLOOD PRESSURE: 70 MMHG | WEIGHT: 160.94 LBS | BODY MASS INDEX: 25.86 KG/M2 | SYSTOLIC BLOOD PRESSURE: 136 MMHG

## 2020-08-13 DIAGNOSIS — E78.5 HYPERLIPIDEMIA, UNSPECIFIED HYPERLIPIDEMIA TYPE: Primary | ICD-10-CM

## 2020-08-13 DIAGNOSIS — Z23 NEED FOR PNEUMOCOCCAL VACCINATION: ICD-10-CM

## 2020-08-13 DIAGNOSIS — Z11.59 ENCOUNTER FOR HEPATITIS C SCREENING TEST FOR LOW RISK PATIENT: ICD-10-CM

## 2020-08-13 DIAGNOSIS — J44.1 COPD EXACERBATION: ICD-10-CM

## 2020-08-13 DIAGNOSIS — J44.9 CHRONIC OBSTRUCTIVE PULMONARY DISEASE, UNSPECIFIED COPD TYPE: ICD-10-CM

## 2020-08-13 DIAGNOSIS — Z78.0 POSTMENOPAUSAL: ICD-10-CM

## 2020-08-13 PROCEDURE — 99499 UNLISTED E&M SERVICE: CPT | Mod: S$GLB,,, | Performed by: FAMILY MEDICINE

## 2020-08-13 PROCEDURE — 1159F PR MEDICATION LIST DOCUMENTED IN MEDICAL RECORD: ICD-10-PCS | Mod: S$GLB,,, | Performed by: FAMILY MEDICINE

## 2020-08-13 PROCEDURE — 99214 OFFICE O/P EST MOD 30 MIN: CPT | Mod: 25,S$GLB,, | Performed by: FAMILY MEDICINE

## 2020-08-13 PROCEDURE — 3008F PR BODY MASS INDEX (BMI) DOCUMENTED: ICD-10-PCS | Mod: CPTII,S$GLB,, | Performed by: FAMILY MEDICINE

## 2020-08-13 PROCEDURE — G0009 PNEUMOCOCCAL CONJUGATE VACCINE 13-VALENT LESS THAN 5YO & GREATER THAN: ICD-10-PCS | Mod: S$GLB,,, | Performed by: FAMILY MEDICINE

## 2020-08-13 PROCEDURE — 90670 PCV13 VACCINE IM: CPT | Mod: S$GLB,,, | Performed by: FAMILY MEDICINE

## 2020-08-13 PROCEDURE — 99499 RISK ADDL DX/OHS AUDIT: ICD-10-PCS | Mod: S$GLB,,, | Performed by: FAMILY MEDICINE

## 2020-08-13 PROCEDURE — 3008F BODY MASS INDEX DOCD: CPT | Mod: CPTII,S$GLB,, | Performed by: FAMILY MEDICINE

## 2020-08-13 PROCEDURE — 99214 PR OFFICE/OUTPT VISIT, EST, LEVL IV, 30-39 MIN: ICD-10-PCS | Mod: 25,S$GLB,, | Performed by: FAMILY MEDICINE

## 2020-08-13 PROCEDURE — 1126F AMNT PAIN NOTED NONE PRSNT: CPT | Mod: S$GLB,,, | Performed by: FAMILY MEDICINE

## 2020-08-13 PROCEDURE — 90670 PNEUMOCOCCAL CONJUGATE VACCINE 13-VALENT LESS THAN 5YO & GREATER THAN: ICD-10-PCS | Mod: S$GLB,,, | Performed by: FAMILY MEDICINE

## 2020-08-13 PROCEDURE — 1159F MED LIST DOCD IN RCRD: CPT | Mod: S$GLB,,, | Performed by: FAMILY MEDICINE

## 2020-08-13 PROCEDURE — 1126F PR PAIN SEVERITY QUANTIFIED, NO PAIN PRESENT: ICD-10-PCS | Mod: S$GLB,,, | Performed by: FAMILY MEDICINE

## 2020-08-13 PROCEDURE — 1101F PR PT FALLS ASSESS DOC 0-1 FALLS W/OUT INJ PAST YR: ICD-10-PCS | Mod: CPTII,S$GLB,, | Performed by: FAMILY MEDICINE

## 2020-08-13 PROCEDURE — 1101F PT FALLS ASSESS-DOCD LE1/YR: CPT | Mod: CPTII,S$GLB,, | Performed by: FAMILY MEDICINE

## 2020-08-13 PROCEDURE — G0009 ADMIN PNEUMOCOCCAL VACCINE: HCPCS | Mod: S$GLB,,, | Performed by: FAMILY MEDICINE

## 2020-08-13 RX ORDER — IPRATROPIUM BROMIDE AND ALBUTEROL 20; 100 UG/1; UG/1
2 SPRAY, METERED RESPIRATORY (INHALATION) EVERY 4 HOURS PRN
Qty: 4 G | Refills: 3 | Status: SHIPPED | OUTPATIENT
Start: 2020-08-13 | End: 2020-10-01 | Stop reason: SDUPTHER

## 2020-08-13 RX ORDER — TIOTROPIUM BROMIDE 18 UG/1
18 CAPSULE ORAL; RESPIRATORY (INHALATION) DAILY
Qty: 30 CAPSULE | Refills: 3 | Status: SHIPPED | OUTPATIENT
Start: 2020-08-13 | End: 2020-10-01 | Stop reason: SDUPTHER

## 2020-08-13 RX ORDER — BUDESONIDE AND FORMOTEROL FUMARATE DIHYDRATE 160; 4.5 UG/1; UG/1
2 AEROSOL RESPIRATORY (INHALATION) EVERY 12 HOURS
Qty: 10.2 G | Refills: 11 | Status: SHIPPED | OUTPATIENT
Start: 2020-08-13 | End: 2020-10-01 | Stop reason: SDUPTHER

## 2020-08-13 NOTE — PROGRESS NOTES
Chief Complaint  Chief Complaint   Patient presents with    Annual Exam       HPI  Svetlana Beck is a 69 y.o. female with multiple medical diagnoses as listed in the medical history and problem list that presents for annual exam.    COPD is stable.  Often wheezes.  SOB with walking short distances.  Chronic morning cough.  No chest pain.  No edema.        PAST MEDICAL HISTORY:  Past Medical History:   Diagnosis Date    Asthma     COPD (chronic obstructive pulmonary disease)        PAST SURGICAL HISTORY:  History reviewed. No pertinent surgical history.    SOCIAL HISTORY:  Social History     Socioeconomic History    Marital status: Single     Spouse name: Not on file    Number of children: Not on file    Years of education: Not on file    Highest education level: Not on file   Occupational History    Not on file   Social Needs    Financial resource strain: Not on file    Food insecurity     Worry: Not on file     Inability: Not on file    Transportation needs     Medical: Not on file     Non-medical: Not on file   Tobacco Use    Smoking status: Current Every Day Smoker    Smokeless tobacco: Never Used   Substance and Sexual Activity    Alcohol use: Yes    Drug use: Not on file    Sexual activity: Not on file   Lifestyle    Physical activity     Days per week: Not on file     Minutes per session: Not on file    Stress: Not at all   Relationships    Social connections     Talks on phone: Not on file     Gets together: Not on file     Attends Sikhism service: Not on file     Active member of club or organization: Not on file     Attends meetings of clubs or organizations: Not on file     Relationship status: Not on file   Other Topics Concern    Not on file   Social History Narrative    Not on file       FAMILY HISTORY:  History reviewed. No pertinent family history.    ALLERGIES AND MEDICATIONS: updated and reviewed.  Review of patient's allergies indicates:   Allergen Reactions     Penicillins Other (See Comments)     Current Outpatient Medications   Medication Sig Dispense Refill    ipratropium-albuteroL (COMBIVENT RESPIMAT)  mcg/actuation inhaler Inhale 2 puffs into the lungs every 4 (four) hours as needed for Wheezing. Rescue 4 g 3    budesonide-formoterol 160-4.5 mcg (SYMBICORT) 160-4.5 mcg/actuation HFAA Inhale 2 puffs into the lungs every 12 (twelve) hours. Controller 10.2 g 11    fluticasone (FLONASE) 50 mcg/actuation nasal spray 1 spray (50 mcg total) by Each Nare route once daily. (Patient not taking: Reported on 8/13/2020) 15.8 g 3    guaiFENesin (MUCINEX) 600 mg 12 hr tablet Take 2 tablets (1,200 mg total) by mouth 2 (two) times daily. (Patient not taking: Reported on 10/31/2019) 28 tablet 0    tiotropium (SPIRIVA) 18 mcg inhalation capsule Inhale 1 capsule (18 mcg total) into the lungs once daily. Controller 30 capsule 3     No current facility-administered medications for this visit.          ROS  Review of Systems   Constitutional: Negative for activity change, appetite change, chills and fatigue.   HENT: Negative for congestion, ear discharge, ear pain, rhinorrhea, sinus pain, sore throat and trouble swallowing.    Eyes: Negative for photophobia, pain, redness, itching and visual disturbance.   Respiratory: Negative for cough, chest tightness, shortness of breath and wheezing.    Cardiovascular: Negative for chest pain, palpitations and leg swelling.   Gastrointestinal: Negative for abdominal distention, abdominal pain, blood in stool, diarrhea, nausea and vomiting.   Genitourinary: Negative for dysuria, pelvic pain, vaginal bleeding, vaginal discharge and vaginal pain.   Musculoskeletal: Negative for arthralgias, back pain, gait problem and neck pain.   Skin: Negative for color change, pallor and rash.   Neurological: Negative for dizziness, tremors, weakness, light-headedness, numbness and headaches.   Psychiatric/Behavioral: Negative for agitation, behavioral  "problems, confusion and sleep disturbance.           PHYSICAL EXAM  Vitals:    08/13/20 1356   BP: 136/70   BP Location: Right arm   Patient Position: Sitting   Pulse: 68   Temp: 97.1 °F (36.2 °C)   TempSrc: Temporal   Weight: 73 kg (160 lb 15 oz)   Height: 5' 6" (1.676 m)    Body mass index is 25.98 kg/m².  Weight: 73 kg (160 lb 15 oz)   Height: 5' 6" (167.6 cm)     Physical Exam  Constitutional:       Appearance: She is well-developed.   HENT:      Head: Normocephalic.   Eyes:      Conjunctiva/sclera: Conjunctivae normal.   Neck:      Musculoskeletal: Normal range of motion and neck supple.   Cardiovascular:      Rate and Rhythm: Normal rate and regular rhythm.      Heart sounds: Normal heart sounds.   Pulmonary:      Effort: Pulmonary effort is normal.      Breath sounds: Examination of the right-upper field reveals wheezing. Examination of the left-upper field reveals wheezing. Examination of the right-middle field reveals wheezing. Examination of the left-middle field reveals wheezing. Examination of the right-lower field reveals decreased breath sounds. Examination of the left-lower field reveals decreased breath sounds. Decreased breath sounds and wheezing present.   Skin:     General: Skin is warm and dry.   Neurological:      Mental Status: She is alert.   Psychiatric:         Behavior: Behavior normal.           Health Maintenance       Date Due Completion Date    Hepatitis C Screening 1950 ---    Lipid Panel 1950 ---    Mammogram 10/10/1992 ---    DEXA SCAN 10/10/1992 ---    Pneumococcal Vaccine (65+ Low/Medium Risk) (1 of 2 - PCV13) 10/10/2017 ---    TETANUS VACCINE 10/25/2020 (Originally 10/10/1970) ---    Shingles Vaccine (1 of 2) 10/25/2020 (Originally 10/10/2002) ---    Colorectal Cancer Screening 10/25/2020 (Originally 10/10/2002) ---    Influenza Vaccine (1) 09/01/2020 ---            Assessment & Plan      Svetlana was seen today for annual exam.    Diagnoses and all orders for this " visit:    Hyperlipidemia, unspecified hyperlipidemia type  -     Lipid Panel; Future    COPD exacerbation  -     ipratropium-albuteroL (COMBIVENT RESPIMAT)  mcg/actuation inhaler; Inhale 2 puffs into the lungs every 4 (four) hours as needed for Wheezing. Rescue  -     Comprehensive metabolic panel; Future  -     CBC auto differential; Future  -     tiotropium (SPIRIVA) 18 mcg inhalation capsule; Inhale 1 capsule (18 mcg total) into the lungs once daily. Controller    Chronic obstructive pulmonary disease, unspecified COPD type  -     budesonide-formoterol 160-4.5 mcg (SYMBICORT) 160-4.5 mcg/actuation HFAA; Inhale 2 puffs into the lungs every 12 (twelve) hours. Controller    Encounter for hepatitis C screening test for low risk patient  -     Hepatitis C Antibody; Future    Postmenopausal  -     DXA Bone Density Spine And Hip; Future    Need for pneumococcal vaccination  -     (In Office Administered) Pneumococcal Conjugate Vaccine (13 Valent) (IM)          Follow-up: No follow-ups on file.

## 2020-08-14 ENCOUNTER — HOSPITAL ENCOUNTER (OUTPATIENT)
Dept: RADIOLOGY | Facility: HOSPITAL | Age: 70
Discharge: HOME | End: 2020-08-14
Attending: FAMILY MEDICINE
Payer: MEDICARE

## 2020-08-14 DIAGNOSIS — Z78.0 POSTMENOPAUSAL: ICD-10-CM

## 2020-08-14 PROCEDURE — 77080 DXA BONE DENSITY AXIAL: CPT | Mod: TC,PO

## 2020-08-20 ENCOUNTER — HOSPITAL ENCOUNTER (OUTPATIENT)
Dept: RADIOLOGY | Facility: HOSPITAL | Age: 70
Discharge: HOME | End: 2020-08-20
Attending: FAMILY MEDICINE
Payer: MEDICARE

## 2020-08-20 DIAGNOSIS — Z12.31 BREAST CANCER SCREENING BY MAMMOGRAM: ICD-10-CM

## 2020-08-20 PROCEDURE — 77067 SCR MAMMO BI INCL CAD: CPT | Mod: TC,PO

## 2020-09-24 ENCOUNTER — TELEPHONE (OUTPATIENT)
Dept: FAMILY MEDICINE | Facility: CLINIC | Age: 70
End: 2020-09-24

## 2020-09-24 NOTE — TELEPHONE ENCOUNTER
----- Message from Meka Paredes sent at 9/24/2020 12:48 PM CDT -----  Regarding: Medication  Type:  Needs Medical Advice    Who Called:  patient  Would the patient rather a call back or a response via [facility]?  Call back  Best Call Back Number:  [phone]  Additional Information:  needs to speak with your office about getting less expensive medications from the doctor as she cannot afford the current ones.    ipratropium-albuteroL (COMBIVENT RESPIMAT)  mcg/actuation inhaler  SYMBICORT 80-4.5 mcg/actuation HFAA    I put the PHN on your desk to review  I can help you with this

## 2020-09-24 NOTE — TELEPHONE ENCOUNTER
After looking at the formulary, it looks like all the comparable inhalers are a level 3 and will cost the same.  The only thing that is a level 1 is for a nebulizer.   I can order this if she would like me to.

## 2020-10-01 ENCOUNTER — TELEPHONE (OUTPATIENT)
Dept: FAMILY MEDICINE | Facility: CLINIC | Age: 70
End: 2020-10-01

## 2020-10-01 ENCOUNTER — OFFICE VISIT (OUTPATIENT)
Dept: FAMILY MEDICINE | Facility: CLINIC | Age: 70
End: 2020-10-01
Payer: MEDICARE

## 2020-10-01 VITALS
TEMPERATURE: 97 F | DIASTOLIC BLOOD PRESSURE: 64 MMHG | OXYGEN SATURATION: 99 % | BODY MASS INDEX: 25.63 KG/M2 | SYSTOLIC BLOOD PRESSURE: 106 MMHG | WEIGHT: 159.5 LBS | HEART RATE: 59 BPM | HEIGHT: 66 IN

## 2020-10-01 DIAGNOSIS — R07.9 CHEST PAIN, UNSPECIFIED TYPE: Primary | ICD-10-CM

## 2020-10-01 DIAGNOSIS — J44.1 COPD EXACERBATION: ICD-10-CM

## 2020-10-01 DIAGNOSIS — G56.00 CARPAL TUNNEL SYNDROME, UNSPECIFIED LATERALITY: ICD-10-CM

## 2020-10-01 DIAGNOSIS — J44.9 CHRONIC OBSTRUCTIVE PULMONARY DISEASE, UNSPECIFIED COPD TYPE: ICD-10-CM

## 2020-10-01 PROCEDURE — 1101F PR PT FALLS ASSESS DOC 0-1 FALLS W/OUT INJ PAST YR: ICD-10-PCS | Mod: CPTII,S$GLB,, | Performed by: FAMILY MEDICINE

## 2020-10-01 PROCEDURE — 99214 PR OFFICE/OUTPT VISIT, EST, LEVL IV, 30-39 MIN: ICD-10-PCS | Mod: 25,S$GLB,, | Performed by: FAMILY MEDICINE

## 2020-10-01 PROCEDURE — 96372 THER/PROPH/DIAG INJ SC/IM: CPT | Mod: S$GLB,,, | Performed by: FAMILY MEDICINE

## 2020-10-01 PROCEDURE — 1159F PR MEDICATION LIST DOCUMENTED IN MEDICAL RECORD: ICD-10-PCS | Mod: S$GLB,,, | Performed by: FAMILY MEDICINE

## 2020-10-01 PROCEDURE — 96372 PR INJECTION,THERAP/PROPH/DIAG2ST, IM OR SUBCUT: ICD-10-PCS | Mod: S$GLB,,, | Performed by: FAMILY MEDICINE

## 2020-10-01 PROCEDURE — 1126F AMNT PAIN NOTED NONE PRSNT: CPT | Mod: S$GLB,,, | Performed by: FAMILY MEDICINE

## 2020-10-01 PROCEDURE — 93010 ELECTROCARDIOGRAM REPORT: CPT | Mod: S$GLB,,, | Performed by: INTERNAL MEDICINE

## 2020-10-01 PROCEDURE — 99214 OFFICE O/P EST MOD 30 MIN: CPT | Mod: 25,S$GLB,, | Performed by: FAMILY MEDICINE

## 2020-10-01 PROCEDURE — 1126F PR PAIN SEVERITY QUANTIFIED, NO PAIN PRESENT: ICD-10-PCS | Mod: S$GLB,,, | Performed by: FAMILY MEDICINE

## 2020-10-01 PROCEDURE — 93005 ELECTROCARDIOGRAM TRACING: CPT | Mod: S$GLB,,, | Performed by: FAMILY MEDICINE

## 2020-10-01 PROCEDURE — 93005 EKG 12-LEAD: ICD-10-PCS | Mod: S$GLB,,, | Performed by: FAMILY MEDICINE

## 2020-10-01 PROCEDURE — 3008F PR BODY MASS INDEX (BMI) DOCUMENTED: ICD-10-PCS | Mod: CPTII,S$GLB,, | Performed by: FAMILY MEDICINE

## 2020-10-01 PROCEDURE — 3008F BODY MASS INDEX DOCD: CPT | Mod: CPTII,S$GLB,, | Performed by: FAMILY MEDICINE

## 2020-10-01 PROCEDURE — 1159F MED LIST DOCD IN RCRD: CPT | Mod: S$GLB,,, | Performed by: FAMILY MEDICINE

## 2020-10-01 PROCEDURE — 93010 EKG 12-LEAD: ICD-10-PCS | Mod: S$GLB,,, | Performed by: INTERNAL MEDICINE

## 2020-10-01 PROCEDURE — 1101F PT FALLS ASSESS-DOCD LE1/YR: CPT | Mod: CPTII,S$GLB,, | Performed by: FAMILY MEDICINE

## 2020-10-01 RX ORDER — IPRATROPIUM BROMIDE AND ALBUTEROL 20; 100 UG/1; UG/1
2 SPRAY, METERED RESPIRATORY (INHALATION) EVERY 4 HOURS PRN
Qty: 4 G | Refills: 3 | Status: SHIPPED | OUTPATIENT
Start: 2020-10-01 | End: 2022-05-25

## 2020-10-01 RX ORDER — FLUTICASONE FUROATE AND VILANTEROL TRIFENATATE 100; 25 UG/1; UG/1
1 POWDER RESPIRATORY (INHALATION) DAILY
Qty: 60 EACH | Refills: 3 | Status: SHIPPED | OUTPATIENT
Start: 2020-10-01 | End: 2022-05-25

## 2020-10-01 RX ORDER — PANTOPRAZOLE SODIUM 40 MG/1
40 TABLET, DELAYED RELEASE ORAL DAILY
Qty: 30 TABLET | Refills: 11 | Status: SHIPPED | OUTPATIENT
Start: 2020-10-01 | End: 2022-05-25

## 2020-10-01 RX ORDER — BETAMETHASONE SODIUM PHOSPHATE AND BETAMETHASONE ACETATE 3; 3 MG/ML; MG/ML
6 INJECTION, SUSPENSION INTRA-ARTICULAR; INTRALESIONAL; INTRAMUSCULAR; SOFT TISSUE ONCE
Status: COMPLETED | OUTPATIENT
Start: 2020-10-01 | End: 2020-10-01

## 2020-10-01 RX ORDER — TIOTROPIUM BROMIDE 18 UG/1
18 CAPSULE ORAL; RESPIRATORY (INHALATION) DAILY
Qty: 30 CAPSULE | Refills: 3 | Status: SHIPPED | OUTPATIENT
Start: 2020-10-01 | End: 2022-05-25

## 2020-10-01 RX ADMIN — BETAMETHASONE SODIUM PHOSPHATE AND BETAMETHASONE ACETATE 6 MG: 3; 3 INJECTION, SUSPENSION INTRA-ARTICULAR; INTRALESIONAL; INTRAMUSCULAR; SOFT TISSUE at 01:10

## 2020-10-01 NOTE — LETTER
October 1, 2020      [address] [place] [address]  Phone: [phone]  Fax: [phone]       Patient: Svetlana Beck   YOB: 1950  Date of Visit: 10/01/2020    To Whom It May Concern:    Keren Beck  was at [facility] on 10/01/2020. She may return to work/school on 10/04/2020 with no restrictions. If you have any questions or concerns, or if I can be of further assistance, please do not hesitate to contact me.    Sincerely,    Elida Lara, DO

## 2020-10-01 NOTE — PROGRESS NOTES
Chief Complaint  Chief Complaint   Patient presents with    Follow-up       HPI  Svetlana Beck is a 69 y.o. female with multiple medical diagnoses as listed in the medical history and problem list that presents chest pain.  She has been having substernal burning pain for about 2 weeks.  Mostly occurs at night.  Always has some SOB due to COPD.  This is no worse than usual.  Pain usually lasts 30 minutes then resolves on it's own.      COPD is stable.  Often wheezes.  SOB with walking short distances.  Chronic morning cough.  No chest pain.  No edema.    Unable to get inhalers due to cost.     PAST MEDICAL HISTORY:  Past Medical History:   Diagnosis Date    Asthma     COPD (chronic obstructive pulmonary disease)        PAST SURGICAL HISTORY:  History reviewed. No pertinent surgical history.    SOCIAL HISTORY:  Social History     Socioeconomic History    Marital status: Single     Spouse name: Not on file    Number of children: Not on file    Years of education: Not on file    Highest education level: Not on file   Occupational History    Not on file   Social Needs    Financial resource strain: Not on file    Food insecurity     Worry: Not on file     Inability: Not on file    Transportation needs     Medical: Not on file     Non-medical: Not on file   Tobacco Use    Smoking status: Current Every Day Smoker    Smokeless tobacco: Never Used   Substance and Sexual Activity    Alcohol use: Yes    Drug use: Not on file    Sexual activity: Not on file   Lifestyle    Physical activity     Days per week: Not on file     Minutes per session: Not on file    Stress: Not at all   Relationships    Social connections     Talks on phone: Not on file     Gets together: Not on file     Attends Holiness service: Not on file     Active member of club or organization: Not on file     Attends meetings of clubs or organizations: Not on file     Relationship status: Not on file   Other Topics Concern    Not on file    Social History Narrative    Not on file       FAMILY HISTORY:  History reviewed. No pertinent family history.    ALLERGIES AND MEDICATIONS: updated and reviewed.  Review of patient's allergies indicates:   Allergen Reactions    Penicillins Other (See Comments)     Current Outpatient Medications   Medication Sig Dispense Refill    ipratropium-albuteroL (COMBIVENT RESPIMAT)  mcg/actuation inhaler Inhale 2 puffs into the lungs every 4 (four) hours as needed for Wheezing. Rescue 4 g 3    fluticasone (FLONASE) 50 mcg/actuation nasal spray 1 spray (50 mcg total) by Each Nare route once daily. (Patient not taking: Reported on 8/13/2020) 15.8 g 3    fluticasone furoate-vilanteroL (BREO ELLIPTA) 100-25 mcg/dose diskus inhaler Inhale 1 puff into the lungs once daily. Controller 60 each 3    guaiFENesin (MUCINEX) 600 mg 12 hr tablet Take 2 tablets (1,200 mg total) by mouth 2 (two) times daily. (Patient not taking: Reported on 10/31/2019) 28 tablet 0    pantoprazole (PROTONIX) 40 MG tablet Take 1 tablet (40 mg total) by mouth once daily. 30 tablet 11    tiotropium (SPIRIVA) 18 mcg inhalation capsule Inhale 1 capsule (18 mcg total) into the lungs once daily. Controller 30 capsule 3     Current Facility-Administered Medications   Medication Dose Route Frequency Provider Last Rate Last Dose    betamethasone acetate-betamethasone sodium phosphate injection 6 mg  6 mg Intramuscular Once Elida Lara,              ROS  Review of Systems   Constitutional: Negative for activity change, appetite change, chills and fatigue.   HENT: Negative for congestion, ear discharge, ear pain, rhinorrhea, sinus pain, sore throat and trouble swallowing.    Eyes: Negative for photophobia, pain, redness, itching and visual disturbance.   Respiratory: Negative for cough, chest tightness, shortness of breath and wheezing.    Cardiovascular: Negative for chest pain, palpitations and leg swelling.   Gastrointestinal: Negative for  "abdominal distention, abdominal pain, blood in stool, diarrhea, nausea and vomiting.   Genitourinary: Negative for dysuria, pelvic pain, vaginal bleeding, vaginal discharge and vaginal pain.   Musculoskeletal: Negative for arthralgias, back pain, gait problem and neck pain.   Skin: Negative for color change, pallor and rash.   Neurological: Negative for dizziness, tremors, weakness, light-headedness, numbness and headaches.   Psychiatric/Behavioral: Negative for agitation, behavioral problems, confusion and sleep disturbance.           PHYSICAL EXAM  Vitals:    10/01/20 1210   BP: 106/64   BP Location: Right arm   Patient Position: Sitting   Pulse: (!) 59   Temp: 97.1 °F (36.2 °C)   TempSrc: Temporal   SpO2: 99%   Weight: 72.3 kg (159 lb 8 oz)   Height: 5' 6" (1.676 m)    Body mass index is 25.74 kg/m².  Weight: 72.3 kg (159 lb 8 oz)   Height: 5' 6" (167.6 cm)     Physical Exam  Constitutional:       Appearance: She is well-developed.   HENT:      Head: Normocephalic.   Eyes:      Conjunctiva/sclera: Conjunctivae normal.   Neck:      Musculoskeletal: Normal range of motion and neck supple.   Cardiovascular:      Rate and Rhythm: Normal rate and regular rhythm.      Heart sounds: Normal heart sounds.   Pulmonary:      Effort: Pulmonary effort is normal.      Breath sounds: Examination of the right-lower field reveals decreased breath sounds. Examination of the left-lower field reveals decreased breath sounds. Decreased breath sounds present. No wheezing.   Skin:     General: Skin is warm and dry.   Neurological:      Mental Status: She is alert.   Psychiatric:         Behavior: Behavior normal.           Health Maintenance       Date Due Completion Date    Hepatitis C Screening 1950 ---    Lipid Panel 1950 ---    Mammogram 10/10/1992 ---    DEXA SCAN 10/10/1992 ---    Pneumococcal Vaccine (65+ Low/Medium Risk) (1 of 2 - PCV13) 10/10/2017 ---    TETANUS VACCINE 10/25/2020 (Originally 10/10/1970) ---    " Shingles Vaccine (1 of 2) 10/25/2020 (Originally 10/10/2002) ---    Colorectal Cancer Screening 10/25/2020 (Originally 10/10/2002) ---    Influenza Vaccine (1) 09/01/2020 ---        EKG: , LBBB.  No previous EKG's available.       Assessment & Plan      Svetlana was seen today for follow-up.    Diagnoses and all orders for this visit:    Chest pain, unspecified type  -     IN OFFICE EKG 12-LEAD (to [place])  -     Ambulatory referral/consult to Cardiology; Future    COPD exacerbation  -     tiotropium (SPIRIVA) 18 mcg inhalation capsule; Inhale 1 capsule (18 mcg total) into the lungs once daily. Controller  -     ipratropium-albuteroL (COMBIVENT RESPIMAT)  mcg/actuation inhaler; Inhale 2 puffs into the lungs every 4 (four) hours as needed for Wheezing. Rescue    Chronic obstructive pulmonary disease, unspecified COPD type  -     fluticasone furoate-vilanteroL (BREO ELLIPTA) 100-25 mcg/dose diskus inhaler; Inhale 1 puff into the lungs once daily. Controller    Carpal tunnel syndrome, unspecified laterality  -     betamethasone acetate-betamethasone sodium phosphate injection 6 mg    Other orders  -     pantoprazole (PROTONIX) 40 MG tablet; Take 1 tablet (40 mg total) by mouth once daily.          Follow-up: No follow-ups on file.

## 2020-10-13 ENCOUNTER — PATIENT OUTREACH (OUTPATIENT)
Dept: ADMINISTRATIVE | Facility: OTHER | Age: 70
End: 2020-10-13

## 2020-10-15 ENCOUNTER — OFFICE VISIT (OUTPATIENT)
Dept: CARDIOLOGY | Facility: CLINIC | Age: 70
End: 2020-10-15
Payer: MEDICARE

## 2020-10-15 VITALS
DIASTOLIC BLOOD PRESSURE: 78 MMHG | OXYGEN SATURATION: 98 % | SYSTOLIC BLOOD PRESSURE: 138 MMHG | HEART RATE: 58 BPM | BODY MASS INDEX: 25.2 KG/M2 | WEIGHT: 156.13 LBS

## 2020-10-15 DIAGNOSIS — J44.9 CHRONIC OBSTRUCTIVE PULMONARY DISEASE, UNSPECIFIED COPD TYPE: ICD-10-CM

## 2020-10-15 DIAGNOSIS — R07.9 CHEST PAIN, UNSPECIFIED TYPE: Primary | ICD-10-CM

## 2020-10-15 DIAGNOSIS — I44.7 LBBB (LEFT BUNDLE BRANCH BLOCK): ICD-10-CM

## 2020-10-15 DIAGNOSIS — F17.200 SMOKER: ICD-10-CM

## 2020-10-15 PROCEDURE — 1159F PR MEDICATION LIST DOCUMENTED IN MEDICAL RECORD: ICD-10-PCS | Mod: S$GLB,,, | Performed by: INTERNAL MEDICINE

## 2020-10-15 PROCEDURE — 1101F PT FALLS ASSESS-DOCD LE1/YR: CPT | Mod: CPTII,S$GLB,, | Performed by: INTERNAL MEDICINE

## 2020-10-15 PROCEDURE — 3008F BODY MASS INDEX DOCD: CPT | Mod: CPTII,S$GLB,, | Performed by: INTERNAL MEDICINE

## 2020-10-15 PROCEDURE — 1126F PR PAIN SEVERITY QUANTIFIED, NO PAIN PRESENT: ICD-10-PCS | Mod: S$GLB,,, | Performed by: INTERNAL MEDICINE

## 2020-10-15 PROCEDURE — 3008F PR BODY MASS INDEX (BMI) DOCUMENTED: ICD-10-PCS | Mod: CPTII,S$GLB,, | Performed by: INTERNAL MEDICINE

## 2020-10-15 PROCEDURE — 99205 PR OFFICE/OUTPT VISIT, NEW, LEVL V, 60-74 MIN: ICD-10-PCS | Mod: S$GLB,,, | Performed by: INTERNAL MEDICINE

## 2020-10-15 PROCEDURE — 1101F PR PT FALLS ASSESS DOC 0-1 FALLS W/OUT INJ PAST YR: ICD-10-PCS | Mod: CPTII,S$GLB,, | Performed by: INTERNAL MEDICINE

## 2020-10-15 PROCEDURE — 1126F AMNT PAIN NOTED NONE PRSNT: CPT | Mod: S$GLB,,, | Performed by: INTERNAL MEDICINE

## 2020-10-15 PROCEDURE — 99205 OFFICE O/P NEW HI 60 MIN: CPT | Mod: S$GLB,,, | Performed by: INTERNAL MEDICINE

## 2020-10-15 PROCEDURE — 99999 PR PBB SHADOW E&M-EST. PATIENT-LVL V: ICD-10-PCS | Mod: PBBFAC,,, | Performed by: INTERNAL MEDICINE

## 2020-10-15 PROCEDURE — 99999 PR PBB SHADOW E&M-EST. PATIENT-LVL V: CPT | Mod: PBBFAC,,, | Performed by: INTERNAL MEDICINE

## 2020-10-15 PROCEDURE — 1159F MED LIST DOCD IN RCRD: CPT | Mod: S$GLB,,, | Performed by: INTERNAL MEDICINE

## 2020-10-15 RX ORDER — BUDESONIDE AND FORMOTEROL FUMARATE DIHYDRATE 80; 4.5 UG/1; UG/1
2 AEROSOL RESPIRATORY (INHALATION)
COMMUNITY
End: 2022-05-25

## 2020-10-15 RX ORDER — CHOLECALCIFEROL (VITAMIN D3) 25 MCG
1000 TABLET ORAL DAILY
COMMUNITY
End: 2022-05-25

## 2020-10-15 NOTE — PROGRESS NOTES
Subjective:    Patient ID:  Svetlana Beck is a 70 y.o. female who presents for evaluation of Chest Pain      HPI    69 y/o female with hx of COPD, active smoker who presents for evaluation of chest pain. Has been having substernal chest pain, non radiating, non exertional that resolves spontaneously. Has chronic MIGUEL from COPD, unchanged. Denies orthopnea, PND, syncope, palps, LE edema. Active smoker attempting to quit (<1PPD currently). Drinks 1 beer per day. Denies orthopnea, PND, syncope, palps, LE edema. Does not follow a heart healthy diet, no regular exercise, no fam hx of early CAD. Had recent ECG with LBBB with LAD (no previous ECG available to compare).     Review of Systems   Constitution: Negative for malaise/fatigue.   HENT: Negative for congestion.    Eyes: Negative for blurred vision.   Cardiovascular: Positive for chest pain and dyspnea on exertion. Negative for claudication, cyanosis, irregular heartbeat, leg swelling, near-syncope, orthopnea, palpitations, paroxysmal nocturnal dyspnea and syncope.   Respiratory: Negative for shortness of breath.    Endocrine: Negative for polyuria.   Hematologic/Lymphatic: Negative for bleeding problem.   Skin: Negative for itching and rash.   Musculoskeletal: Negative for joint swelling, muscle cramps and muscle weakness.   Gastrointestinal: Negative for abdominal pain, hematemesis, hematochezia, melena, nausea and vomiting.   Genitourinary: Negative for dysuria and hematuria.   Neurological: Negative for dizziness, focal weakness, headaches, light-headedness, loss of balance and weakness.   Psychiatric/Behavioral: Negative for depression. The patient is not nervous/anxious.         Objective:    Physical Exam   Constitutional: She is oriented to person, place, and time. She appears well-developed and well-nourished.   HENT:   Head: Normocephalic and atraumatic.   Neck: Neck supple. No JVD present.   Cardiovascular: Normal rate, regular rhythm and normal heart  sounds.   Pulses:       Carotid pulses are 2+ on the right side and 2+ on the left side.       Radial pulses are 2+ on the right side and 2+ on the left side.        Femoral pulses are 2+ on the right side and 2+ on the left side.       Dorsalis pedis pulses are 2+ on the right side and 2+ on the left side.        Posterior tibial pulses are 2+ on the right side and 2+ on the left side.   Pulmonary/Chest: Effort normal and breath sounds normal.   Abdominal: Soft. Bowel sounds are normal.   Musculoskeletal:         General: No edema.   Neurological: She is alert and oriented to person, place, and time.   Skin: Skin is warm and dry.   Psychiatric: She has a normal mood and affect. Her behavior is normal. Thought content normal.         Assessment:       1. Chest pain, unspecified type    2. Chronic obstructive pulmonary disease, unspecified COPD type    3. Smoker    4. LBBB (left bundle branch block)      70 year old patient with hx and presentation as above. Has risk factors for having CAD and will evaluate symptoms with noninvasive cardiac stress imaging and 2DE. Has LBBB on ECG. Needs to stop smoking ASAP. Offered referral for smoking cessation, however, refuses for now. Discussed the etiology, evaluation, and management of LBBB, CAD, COPD, smoking. Discussed the importance of med compliance, heart healthy diet, and regular exercise.      Plan:       -Nuclear SPECT  -2DE  -f/u in 1 month

## 2020-10-15 NOTE — LETTER
October 15, 2020      Elida Lara, DO  [address] [address]           [facility] - Cardiology  [address], [address]  [facility] [address]  Phone: [phone]  Fax: [phone]          Patient: Svetlana Beck   MR Number: [id number]   YOB: 1950   Date of Visit: 10/15/2020       Dear Dr. Elida Lara:    Thank you for referring Svetlana Beck to me for evaluation. Attached you will find relevant portions of my assessment and plan of care.    If you have questions, please do not hesitate to call me. I look forward to following Svetlana Beck along with you.    Sincerely,    Sánchez Garcia MD    Enclosure  CC:  No Recipients    If you would like to receive this communication electronically, please contact [email or web address] or [phone] to request more information on [facility] Link access.    For providers and/or their staff who would like to refer a patient to [facility], please contact us through our one-stop-shop provider referral line, [facility], at [phone].    If you feel you have received this communication in error or would no longer like to receive these types of communications, please e-mail [email or web address]

## 2020-10-27 ENCOUNTER — HOSPITAL ENCOUNTER (OUTPATIENT)
Dept: CARDIOLOGY | Facility: HOSPITAL | Age: 70
Discharge: HOME | End: 2020-10-27
Attending: INTERNAL MEDICINE
Payer: MEDICARE

## 2020-10-27 ENCOUNTER — HOSPITAL ENCOUNTER (OUTPATIENT)
Dept: RADIOLOGY | Facility: HOSPITAL | Age: 70
Discharge: HOME | End: 2020-10-27
Attending: INTERNAL MEDICINE
Payer: MEDICARE

## 2020-10-27 VITALS — BODY MASS INDEX: 25.07 KG/M2 | WEIGHT: 156 LBS | HEIGHT: 66 IN

## 2020-10-27 DIAGNOSIS — R07.9 CHEST PAIN, UNSPECIFIED TYPE: ICD-10-CM

## 2020-10-27 LAB
AORTIC ROOT ANNULUS: 2.47 CM
AORTIC VALVE CUSP SEPERATION: 1.77 CM
AV INDEX (PROSTH): 0.7
AV MEAN GRADIENT: 5 MMHG
AV PEAK GRADIENT: 10 MMHG
AV VALVE AREA: 3.19 CM2
AV VELOCITY RATIO: 0.64
BSA FOR ECHO PROCEDURE: 1.82 M2
CV ECHO LV RWT: 0.39 CM
CV STRESS BASE HR: 51 BPM
DIASTOLIC BLOOD PRESSURE: 75 MMHG
DOP CALC AO PEAK VEL: 1.59 M/S
DOP CALC AO VTI: 29.37 CM
DOP CALC LVOT AREA: 4.6 CM2
DOP CALC LVOT DIAMETER: 2.41 CM
DOP CALC LVOT PEAK VEL: 1.02 M/S
DOP CALC LVOT STROKE VOLUME: 93.69 CM3
DOP CALCLVOT PEAK VEL VTI: 20.55 CM
E WAVE DECELERATION TIME: 283.78 MSEC
E/A RATIO: 0.66
ECHO LV POSTERIOR WALL: 1.04 CM (ref 0.6–1.1)
FRACTIONAL SHORTENING: 12 % (ref 28–44)
INTERVENTRICULAR SEPTUM: 1.34 CM (ref 0.6–1.1)
LA MAJOR: 4.65 CM
LA MINOR: 4.81 CM
LA WIDTH: 3.54 CM
LEFT ATRIUM SIZE: 2.95 CM
LEFT ATRIUM VOLUME INDEX: 23.3 ML/M2
LEFT ATRIUM VOLUME: 41.97 CM3
LEFT INTERNAL DIMENSION IN SYSTOLE: 4.65 CM (ref 2.1–4)
LEFT VENTRICLE DIASTOLIC VOLUME INDEX: 74.45 ML/M2
LEFT VENTRICLE DIASTOLIC VOLUME: 133.98 ML
LEFT VENTRICLE MASS INDEX: 140 G/M2
LEFT VENTRICLE SYSTOLIC VOLUME INDEX: 55.4 ML/M2
LEFT VENTRICLE SYSTOLIC VOLUME: 99.69 ML
LEFT VENTRICULAR INTERNAL DIMENSION IN DIASTOLE: 5.28 CM (ref 3.5–6)
LEFT VENTRICULAR MASS: 252.08 G
MV PEAK A VEL: 0.73 M/S
MV PEAK E VEL: 0.48 M/S
MV STENOSIS PRESSURE HALF TIME: 82.3 MS
MV VALVE AREA P 1/2 METHOD: 2.67 CM2
OHS CV CPX 1 MINUTE RECOVERY HEART RATE: 65 BPM
OHS CV CPX 85 PERCENT MAX PREDICTED HEART RATE MALE: 124
OHS CV CPX MAX PREDICTED HEART RATE: 146
OHS CV CPX PATIENT IS FEMALE: 1
OHS CV CPX PATIENT IS MALE: 0
OHS CV CPX PEAK DIASTOLIC BLOOD PRESSURE: 85 MMHG
OHS CV CPX PEAK HEAR RATE: 87 BPM
OHS CV CPX PEAK RATE PRESSURE PRODUCT: NORMAL
OHS CV CPX PEAK SYSTOLIC BLOOD PRESSURE: 147 MMHG
OHS CV CPX PERCENT MAX PREDICTED HEART RATE ACHIEVED: 60
OHS CV CPX RATE PRESSURE PRODUCT PRESENTING: 6069
PISA MRMAX VEL: 0.06 M/S
PULM VEIN S/D RATIO: 0.41
PV PEAK D VEL: 0.63 M/S
PV PEAK S VEL: 0.26 M/S
PV PEAK VELOCITY: 0.84 CM/S
RA MAJOR: 4.32 CM
RA PRESSURE: 3 MMHG
RA WIDTH: 2.99 CM
RIGHT VENTRICULAR END-DIASTOLIC DIMENSION: 3.19 CM
SINUS: 2.65 CM
SYSTOLIC BLOOD PRESSURE: 119 MMHG
TRICUSPID ANNULAR PLANE SYSTOLIC EXCURSION: 2.26 CM

## 2020-10-27 PROCEDURE — 93306 TTE W/DOPPLER COMPLETE: CPT | Mod: PO

## 2020-10-27 PROCEDURE — 93018 NUCLEAR STRESS TEST (CUPID ONLY): ICD-10-PCS | Mod: ,,, | Performed by: INTERNAL MEDICINE

## 2020-10-27 PROCEDURE — A9502 TC99M TETROFOSMIN: HCPCS | Mod: PO

## 2020-10-27 PROCEDURE — 93306 TTE W/DOPPLER COMPLETE: CPT | Mod: 26,,, | Performed by: INTERNAL MEDICINE

## 2020-10-27 PROCEDURE — 63600175 PHARM REV CODE 636 W HCPCS: Mod: PO | Performed by: INTERNAL MEDICINE

## 2020-10-27 PROCEDURE — 93017 CV STRESS TEST TRACING ONLY: CPT | Mod: PO

## 2020-10-27 PROCEDURE — 93018 CV STRESS TEST I&R ONLY: CPT | Mod: ,,, | Performed by: INTERNAL MEDICINE

## 2020-10-27 PROCEDURE — 93306 ECHO (CUPID ONLY): ICD-10-PCS | Mod: 26,,, | Performed by: INTERNAL MEDICINE

## 2020-10-27 RX ORDER — REGADENOSON 0.08 MG/ML
0.4 INJECTION, SOLUTION INTRAVENOUS ONCE
Status: COMPLETED | OUTPATIENT
Start: 2020-10-27 | End: 2020-10-27

## 2020-10-27 RX ADMIN — REGADENOSON 0.4 MG: 0.08 INJECTION, SOLUTION INTRAVENOUS at 12:10

## 2020-12-01 ENCOUNTER — PATIENT OUTREACH (OUTPATIENT)
Dept: ADMINISTRATIVE | Facility: OTHER | Age: 70
End: 2020-12-01

## 2020-12-01 DIAGNOSIS — Z12.11 ENCOUNTER FOR FIT (FECAL IMMUNOCHEMICAL TEST) SCREENING: Primary | ICD-10-CM

## 2020-12-01 NOTE — PROGRESS NOTES
Updates were requested from care everywhere.  Chart was reviewed for overdue Proactive [facility] Encounters ([facility]) topics (CRS, Breast Cancer Screening, Eye exam)  Health Maintenance has been updated.  LINKS not responding.  Order placed for FIT kit.

## 2021-01-12 ENCOUNTER — PATIENT OUTREACH (OUTPATIENT)
Dept: ADMINISTRATIVE | Facility: OTHER | Age: 71
End: 2021-01-12

## 2021-01-28 ENCOUNTER — PATIENT OUTREACH (OUTPATIENT)
Dept: ADMINISTRATIVE | Facility: HOSPITAL | Age: 71
End: 2021-01-28

## 2021-03-12 ENCOUNTER — TELEPHONE (OUTPATIENT)
Dept: FAMILY MEDICINE | Facility: CLINIC | Age: 71
End: 2021-03-12

## 2021-03-12 RX ORDER — IPRATROPIUM BROMIDE AND ALBUTEROL SULFATE 2.5; .5 MG/3ML; MG/3ML
3 SOLUTION RESPIRATORY (INHALATION) EVERY 6 HOURS PRN
Qty: 1 BOX | Refills: 5 | Status: SHIPPED | OUTPATIENT
Start: 2021-03-12 | End: 2022-03-12

## 2021-10-19 ENCOUNTER — PES CALL (OUTPATIENT)
Dept: ADMINISTRATIVE | Facility: CLINIC | Age: 71
End: 2021-10-19

## 2021-11-19 ENCOUNTER — OFFICE VISIT (OUTPATIENT)
Dept: FAMILY MEDICINE | Facility: CLINIC | Age: 71
End: 2021-11-19
Payer: MEDICARE

## 2021-11-19 VITALS
DIASTOLIC BLOOD PRESSURE: 82 MMHG | TEMPERATURE: 98 F | WEIGHT: 167.75 LBS | HEART RATE: 57 BPM | HEIGHT: 66 IN | SYSTOLIC BLOOD PRESSURE: 120 MMHG | BODY MASS INDEX: 26.96 KG/M2 | OXYGEN SATURATION: 100 %

## 2021-11-19 DIAGNOSIS — J44.9 CHRONIC OBSTRUCTIVE PULMONARY DISEASE, UNSPECIFIED COPD TYPE: ICD-10-CM

## 2021-11-19 DIAGNOSIS — J44.1 COPD EXACERBATION: Primary | ICD-10-CM

## 2021-11-19 PROCEDURE — 3288F PR FALLS RISK ASSESSMENT DOCUMENTED: ICD-10-PCS | Mod: CPTII,S$GLB,, | Performed by: STUDENT IN AN ORGANIZED HEALTH CARE EDUCATION/TRAINING PROGRAM

## 2021-11-19 PROCEDURE — 3079F DIAST BP 80-89 MM HG: CPT | Mod: CPTII,S$GLB,, | Performed by: STUDENT IN AN ORGANIZED HEALTH CARE EDUCATION/TRAINING PROGRAM

## 2021-11-19 PROCEDURE — 3074F PR MOST RECENT SYSTOLIC BLOOD PRESSURE < 130 MM HG: ICD-10-PCS | Mod: CPTII,S$GLB,, | Performed by: STUDENT IN AN ORGANIZED HEALTH CARE EDUCATION/TRAINING PROGRAM

## 2021-11-19 PROCEDURE — 3008F BODY MASS INDEX DOCD: CPT | Mod: CPTII,S$GLB,, | Performed by: STUDENT IN AN ORGANIZED HEALTH CARE EDUCATION/TRAINING PROGRAM

## 2021-11-19 PROCEDURE — 1159F PR MEDICATION LIST DOCUMENTED IN MEDICAL RECORD: ICD-10-PCS | Mod: CPTII,S$GLB,, | Performed by: STUDENT IN AN ORGANIZED HEALTH CARE EDUCATION/TRAINING PROGRAM

## 2021-11-19 PROCEDURE — 1159F MED LIST DOCD IN RCRD: CPT | Mod: CPTII,S$GLB,, | Performed by: STUDENT IN AN ORGANIZED HEALTH CARE EDUCATION/TRAINING PROGRAM

## 2021-11-19 PROCEDURE — 1126F AMNT PAIN NOTED NONE PRSNT: CPT | Mod: CPTII,S$GLB,, | Performed by: STUDENT IN AN ORGANIZED HEALTH CARE EDUCATION/TRAINING PROGRAM

## 2021-11-19 PROCEDURE — 99214 OFFICE O/P EST MOD 30 MIN: CPT | Mod: S$GLB,,, | Performed by: STUDENT IN AN ORGANIZED HEALTH CARE EDUCATION/TRAINING PROGRAM

## 2021-11-19 PROCEDURE — 3079F PR MOST RECENT DIASTOLIC BLOOD PRESSURE 80-89 MM HG: ICD-10-PCS | Mod: CPTII,S$GLB,, | Performed by: STUDENT IN AN ORGANIZED HEALTH CARE EDUCATION/TRAINING PROGRAM

## 2021-11-19 PROCEDURE — 1101F PT FALLS ASSESS-DOCD LE1/YR: CPT | Mod: CPTII,S$GLB,, | Performed by: STUDENT IN AN ORGANIZED HEALTH CARE EDUCATION/TRAINING PROGRAM

## 2021-11-19 PROCEDURE — 99214 PR OFFICE/OUTPT VISIT, EST, LEVL IV, 30-39 MIN: ICD-10-PCS | Mod: S$GLB,,, | Performed by: STUDENT IN AN ORGANIZED HEALTH CARE EDUCATION/TRAINING PROGRAM

## 2021-11-19 PROCEDURE — 3008F PR BODY MASS INDEX (BMI) DOCUMENTED: ICD-10-PCS | Mod: CPTII,S$GLB,, | Performed by: STUDENT IN AN ORGANIZED HEALTH CARE EDUCATION/TRAINING PROGRAM

## 2021-11-19 PROCEDURE — 1101F PR PT FALLS ASSESS DOC 0-1 FALLS W/OUT INJ PAST YR: ICD-10-PCS | Mod: CPTII,S$GLB,, | Performed by: STUDENT IN AN ORGANIZED HEALTH CARE EDUCATION/TRAINING PROGRAM

## 2021-11-19 PROCEDURE — 99499 UNLISTED E&M SERVICE: CPT | Mod: S$GLB,,, | Performed by: STUDENT IN AN ORGANIZED HEALTH CARE EDUCATION/TRAINING PROGRAM

## 2021-11-19 PROCEDURE — 99499 RISK ADDL DX/OHS AUDIT: ICD-10-PCS | Mod: S$GLB,,, | Performed by: STUDENT IN AN ORGANIZED HEALTH CARE EDUCATION/TRAINING PROGRAM

## 2021-11-19 PROCEDURE — 3288F FALL RISK ASSESSMENT DOCD: CPT | Mod: CPTII,S$GLB,, | Performed by: STUDENT IN AN ORGANIZED HEALTH CARE EDUCATION/TRAINING PROGRAM

## 2021-11-19 PROCEDURE — 1160F PR REVIEW ALL MEDS BY PRESCRIBER/CLIN PHARMACIST DOCUMENTED: ICD-10-PCS | Mod: CPTII,S$GLB,, | Performed by: STUDENT IN AN ORGANIZED HEALTH CARE EDUCATION/TRAINING PROGRAM

## 2021-11-19 PROCEDURE — 1126F PR PAIN SEVERITY QUANTIFIED, NO PAIN PRESENT: ICD-10-PCS | Mod: CPTII,S$GLB,, | Performed by: STUDENT IN AN ORGANIZED HEALTH CARE EDUCATION/TRAINING PROGRAM

## 2021-11-19 PROCEDURE — 1160F RVW MEDS BY RX/DR IN RCRD: CPT | Mod: CPTII,S$GLB,, | Performed by: STUDENT IN AN ORGANIZED HEALTH CARE EDUCATION/TRAINING PROGRAM

## 2021-11-19 PROCEDURE — 3074F SYST BP LT 130 MM HG: CPT | Mod: CPTII,S$GLB,, | Performed by: STUDENT IN AN ORGANIZED HEALTH CARE EDUCATION/TRAINING PROGRAM

## 2021-11-19 RX ORDER — ALBUTEROL SULFATE 90 UG/1
2 AEROSOL, METERED RESPIRATORY (INHALATION) EVERY 6 HOURS PRN
Qty: 18 G | Refills: 3 | Status: SHIPPED | OUTPATIENT
Start: 2021-11-19 | End: 2022-05-25 | Stop reason: SDUPTHER

## 2021-11-19 RX ORDER — PREDNISONE 20 MG/1
60 TABLET ORAL DAILY
Qty: 15 TABLET | Refills: 0 | Status: SHIPPED | OUTPATIENT
Start: 2021-11-19 | End: 2021-11-24

## 2021-11-19 RX ORDER — LEVOFLOXACIN 500 MG/1
500 TABLET, FILM COATED ORAL DAILY
Qty: 5 TABLET | Refills: 0 | Status: SHIPPED | OUTPATIENT
Start: 2021-11-19 | End: 2022-05-25

## 2022-02-01 ENCOUNTER — TELEPHONE (OUTPATIENT)
Dept: INTERNAL MEDICINE | Facility: CLINIC | Age: 72
End: 2022-02-01
Payer: MEDICARE

## 2022-02-01 NOTE — TELEPHONE ENCOUNTER
Called Patient to schedule Medicare Wellness Visit offered by [facility]. Number is not in service

## 2022-02-22 ENCOUNTER — TELEPHONE (OUTPATIENT)
Dept: FAMILY MEDICINE | Facility: CLINIC | Age: 72
End: 2022-02-22
Payer: MEDICARE

## 2022-02-22 NOTE — TELEPHONE ENCOUNTER
Called Patient to schedule Medicare Wellness Visit offered by [facility]. Patient will give us a callback Thursday morning

## 2022-02-25 ENCOUNTER — TELEPHONE (OUTPATIENT)
Dept: FAMILY MEDICINE | Facility: CLINIC | Age: 72
End: 2022-02-25
Payer: MEDICARE

## 2022-03-18 PROBLEM — I70.0 AORTIC ATHEROSCLEROSIS: Status: ACTIVE | Noted: 2022-03-18

## 2022-03-18 PROBLEM — M85.80 OSTEOPENIA: Status: ACTIVE | Noted: 2022-03-18

## 2022-03-23 ENCOUNTER — TELEPHONE (OUTPATIENT)
Dept: ADMINISTRATIVE | Facility: CLINIC | Age: 72
End: 2022-03-23
Payer: MEDICARE

## 2022-03-31 ENCOUNTER — PES CALL (OUTPATIENT)
Dept: ADMINISTRATIVE | Facility: CLINIC | Age: 72
End: 2022-03-31
Payer: MEDICARE

## 2022-05-24 ENCOUNTER — TELEPHONE (OUTPATIENT)
Dept: FAMILY MEDICINE | Facility: CLINIC | Age: 72
End: 2022-05-24
Payer: MEDICARE

## 2022-05-24 NOTE — TELEPHONE ENCOUNTER
----- Message from Sade Brown sent at 5/24/2022  9:05 AM CDT -----  Type:  Same Day Appointment Request    Caller is requesting a same day appointment.  Caller declined first available appointment listed below.    Name of Caller:pt  When is the first available appointment?06/20/22  Symptoms:runny nose  Best Call Back Number:[phone] (the number is not in service )        I spoke with the pt and scheduled appt for tomorrow with dr arnold

## 2022-05-25 ENCOUNTER — OFFICE VISIT (OUTPATIENT)
Dept: FAMILY MEDICINE | Facility: CLINIC | Age: 72
End: 2022-05-25
Payer: MEDICARE

## 2022-05-25 VITALS
WEIGHT: 159.81 LBS | DIASTOLIC BLOOD PRESSURE: 68 MMHG | TEMPERATURE: 98 F | SYSTOLIC BLOOD PRESSURE: 136 MMHG | HEART RATE: 54 BPM | HEIGHT: 66 IN | OXYGEN SATURATION: 97 % | BODY MASS INDEX: 25.68 KG/M2

## 2022-05-25 DIAGNOSIS — J30.1 SEASONAL ALLERGIC RHINITIS DUE TO POLLEN: Primary | ICD-10-CM

## 2022-05-25 DIAGNOSIS — J44.9 CHRONIC OBSTRUCTIVE PULMONARY DISEASE, UNSPECIFIED COPD TYPE: ICD-10-CM

## 2022-05-25 DIAGNOSIS — J06.9 URI, ACUTE: ICD-10-CM

## 2022-05-25 PROCEDURE — 3008F PR BODY MASS INDEX (BMI) DOCUMENTED: ICD-10-PCS | Mod: CPTII,S$GLB,, | Performed by: FAMILY MEDICINE

## 2022-05-25 PROCEDURE — 3075F SYST BP GE 130 - 139MM HG: CPT | Mod: CPTII,S$GLB,, | Performed by: FAMILY MEDICINE

## 2022-05-25 PROCEDURE — 1159F PR MEDICATION LIST DOCUMENTED IN MEDICAL RECORD: ICD-10-PCS | Mod: CPTII,S$GLB,, | Performed by: FAMILY MEDICINE

## 2022-05-25 PROCEDURE — 1101F PR PT FALLS ASSESS DOC 0-1 FALLS W/OUT INJ PAST YR: ICD-10-PCS | Mod: CPTII,S$GLB,, | Performed by: FAMILY MEDICINE

## 2022-05-25 PROCEDURE — 99213 PR OFFICE/OUTPT VISIT, EST, LEVL III, 20-29 MIN: ICD-10-PCS | Mod: S$GLB,,, | Performed by: FAMILY MEDICINE

## 2022-05-25 PROCEDURE — 1126F AMNT PAIN NOTED NONE PRSNT: CPT | Mod: CPTII,S$GLB,, | Performed by: FAMILY MEDICINE

## 2022-05-25 PROCEDURE — 3078F DIAST BP <80 MM HG: CPT | Mod: CPTII,S$GLB,, | Performed by: FAMILY MEDICINE

## 2022-05-25 PROCEDURE — 1101F PT FALLS ASSESS-DOCD LE1/YR: CPT | Mod: CPTII,S$GLB,, | Performed by: FAMILY MEDICINE

## 2022-05-25 PROCEDURE — 3288F PR FALLS RISK ASSESSMENT DOCUMENTED: ICD-10-PCS | Mod: CPTII,S$GLB,, | Performed by: FAMILY MEDICINE

## 2022-05-25 PROCEDURE — 1159F MED LIST DOCD IN RCRD: CPT | Mod: CPTII,S$GLB,, | Performed by: FAMILY MEDICINE

## 2022-05-25 PROCEDURE — 99499 RISK ADDL DX/OHS AUDIT: ICD-10-PCS | Mod: S$GLB,,, | Performed by: FAMILY MEDICINE

## 2022-05-25 PROCEDURE — 99213 OFFICE O/P EST LOW 20 MIN: CPT | Mod: S$GLB,,, | Performed by: FAMILY MEDICINE

## 2022-05-25 PROCEDURE — 3075F PR MOST RECENT SYSTOLIC BLOOD PRESS GE 130-139MM HG: ICD-10-PCS | Mod: CPTII,S$GLB,, | Performed by: FAMILY MEDICINE

## 2022-05-25 PROCEDURE — 99499 UNLISTED E&M SERVICE: CPT | Mod: S$GLB,,, | Performed by: FAMILY MEDICINE

## 2022-05-25 PROCEDURE — 1126F PR PAIN SEVERITY QUANTIFIED, NO PAIN PRESENT: ICD-10-PCS | Mod: CPTII,S$GLB,, | Performed by: FAMILY MEDICINE

## 2022-05-25 PROCEDURE — 3288F FALL RISK ASSESSMENT DOCD: CPT | Mod: CPTII,S$GLB,, | Performed by: FAMILY MEDICINE

## 2022-05-25 PROCEDURE — 3008F BODY MASS INDEX DOCD: CPT | Mod: CPTII,S$GLB,, | Performed by: FAMILY MEDICINE

## 2022-05-25 PROCEDURE — 3078F PR MOST RECENT DIASTOLIC BLOOD PRESSURE < 80 MM HG: ICD-10-PCS | Mod: CPTII,S$GLB,, | Performed by: FAMILY MEDICINE

## 2022-05-25 RX ORDER — LORATADINE 10 MG/1
10 TABLET ORAL DAILY
Qty: 30 TABLET | Refills: 0 | Status: SHIPPED | OUTPATIENT
Start: 2022-05-25 | End: 2022-07-02

## 2022-05-25 RX ORDER — FLUTICASONE PROPIONATE 50 MCG
1 SPRAY, SUSPENSION (ML) NASAL DAILY
Qty: 18 G | Refills: 3 | Status: SHIPPED | OUTPATIENT
Start: 2022-05-25

## 2022-05-25 RX ORDER — ALBUTEROL SULFATE 90 UG/1
2 AEROSOL, METERED RESPIRATORY (INHALATION) EVERY 6 HOURS PRN
Qty: 18 G | Refills: 3 | Status: SHIPPED | OUTPATIENT
Start: 2022-05-25

## 2022-05-25 NOTE — PROGRESS NOTES
Subjective:       Patient ID: Svetlana Beck is a 71 y.o. female.    Chief Complaint: Sinus Problem    72 y/o female with hx of COPD , stable on inhaler, with c/o having runny nose x 2-3 days, denies f/c/ or body ache    Pt is vaccinated    Works at [employer]       Review of Systems    Objective:      Physical Exam  Vitals and nursing note reviewed.   Constitutional:       General: She is not in acute distress.     Appearance: Normal appearance. She is well-developed. She is not diaphoretic.   HENT:      Head: Normocephalic and atraumatic.      Nose: Nose normal.      Mouth/Throat:      Mouth: Mucous membranes are moist.      Pharynx: Oropharynx is clear. No oropharyngeal exudate or posterior oropharyngeal erythema.   Eyes:      General: Lids are normal.      Conjunctiva/sclera: Conjunctivae normal.   Neck:      Trachea: Trachea and phonation normal.   Cardiovascular:      Rate and Rhythm: Normal rate and regular rhythm.      Pulses: Normal pulses.      Heart sounds: Normal heart sounds.   Pulmonary:      Effort: Pulmonary effort is normal.      Breath sounds: Normal breath sounds.   Abdominal:      General: Bowel sounds are normal. There is no abdominal bruit.      Palpations: Abdomen is soft. There is no mass or pulsatile mass.   Musculoskeletal:         General: No deformity.      Cervical back: Full passive range of motion without pain, normal range of motion and neck supple.   Skin:     General: Skin is warm and dry.   Neurological:      Mental Status: She is alert and oriented to person, place, and time.      Sensory: No sensory deficit.      Deep Tendon Reflexes: Reflexes are normal and symmetric.   Psychiatric:         Speech: Speech normal.         Behavior: Behavior normal. Behavior is cooperative.         Thought Content: Thought content normal.         Judgment: Judgment normal.         Assessment:       1. Seasonal allergic rhinitis due to pollen    2. Chronic obstructive pulmonary disease,  unspecified COPD type    3. URI, acute        Plan:         Svetlana was seen today for sinus problem.    Diagnoses and all orders for this visit:    Seasonal allergic rhinitis due to pollen  -     POCT COVID-19 Rapid Screening    Chronic obstructive pulmonary disease, unspecified COPD type  -     albuterol (PROVENTIL/VENTOLIN HFA) 90 mcg/actuation inhaler; Inhale 2 puffs into the lungs every 6 (six) hours as needed for Wheezing. Rescue    URI, acute  -     POCT COVID-19 Rapid Screening    Other orders  -     loratadine (CLARITIN) 10 mg tablet; Take 1 tablet (10 mg total) by mouth once daily.  -     fluticasone propionate (FLONASE) 50 mcg/actuation nasal spray; 1 spray (50 mcg total) by Each Nostril route once daily.

## 2022-05-25 NOTE — LETTER
May 25, 2022      [place] [id number] [address]  Phone: [phone]  Fax: [phone]       Patient: Svetlana Beck   YOB: 1950  Date of Visit: 05/25/2022    To Whom It May Concern:    Keren Beck  was at [facility] on 05/25/2022. The patient may return to work/school on 5/29/22 with no restrictions. If you have any questions or concerns, or if I can be of further assistance, please do not hesitate to contact me.    Sincerely,    Rere Alcala MD

## 2022-05-25 NOTE — LETTER
May 25, 2022      [place] [id number] [address]  Phone: [phone]  Fax: [phone]       Patient: Svetlana Beck   YOB: 1950  Date of Visit: 05/25/2022    To Whom It May Concern:    Keren Beck  was at [facility] on 05/25/2022. The patient may return to work/school on 5/29/22 with no restrictions, Please excuse her for 5/24 and 5/25 until 5/28    . If you have any questions or concerns, or if I can be of further assistance, please do not hesitate to contact me.    Sincerely,    Rere Alcala MD

## 2022-10-04 ENCOUNTER — PES CALL (OUTPATIENT)
Dept: ADMINISTRATIVE | Facility: CLINIC | Age: 72
End: 2022-10-04
Payer: MEDICARE

## 2022-10-07 ENCOUNTER — PES CALL (OUTPATIENT)
Dept: ADMINISTRATIVE | Facility: CLINIC | Age: 72
End: 2022-10-07
Payer: MEDICARE

## 2022-11-01 ENCOUNTER — PES CALL (OUTPATIENT)
Dept: ADMINISTRATIVE | Facility: CLINIC | Age: 72
End: 2022-11-01
Payer: MEDICARE

## 2022-11-07 ENCOUNTER — PES CALL (OUTPATIENT)
Dept: ADMINISTRATIVE | Facility: CLINIC | Age: 72
End: 2022-11-07
Payer: MEDICARE

## 2022-12-19 ENCOUNTER — PES CALL (OUTPATIENT)
Dept: ADMINISTRATIVE | Facility: CLINIC | Age: 72
End: 2022-12-19
Payer: MEDICARE

## 2023-03-15 ENCOUNTER — PES CALL (OUTPATIENT)
Dept: ADMINISTRATIVE | Facility: CLINIC | Age: 73
End: 2023-03-15
Payer: MEDICARE

## 2023-06-21 ENCOUNTER — HOSPITAL ENCOUNTER (INPATIENT)
Facility: HOSPITAL | Age: 73
LOS: 7 days | Discharge: NURSING, REHAB OR LONG-TERM CARE | DRG: 064 | End: 2023-06-28
Attending: EMERGENCY MEDICINE | Admitting: HOSPITALIST
Payer: MEDICARE

## 2023-06-21 ENCOUNTER — HOSPITAL ENCOUNTER (EMERGENCY)
Facility: HOSPITAL | Age: 73
Discharge: OTHER INSTITUTION | End: 2023-06-21
Attending: EMERGENCY MEDICINE
Payer: MEDICARE

## 2023-06-21 VITALS
HEART RATE: 91 BPM | TEMPERATURE: 98 F | RESPIRATION RATE: 20 BRPM | SYSTOLIC BLOOD PRESSURE: 106 MMHG | OXYGEN SATURATION: 98 % | DIASTOLIC BLOOD PRESSURE: 58 MMHG

## 2023-06-21 DIAGNOSIS — G93.40 ENCEPHALOPATHY: ICD-10-CM

## 2023-06-21 DIAGNOSIS — R79.89 ELEVATED TROPONIN: Primary | ICD-10-CM

## 2023-06-21 DIAGNOSIS — I63.411 EMBOLIC STROKE INVOLVING RIGHT MIDDLE CEREBRAL ARTERY: ICD-10-CM

## 2023-06-21 DIAGNOSIS — R06.02 SHORTNESS OF BREATH: ICD-10-CM

## 2023-06-21 DIAGNOSIS — I63.89 ACUTE HEMORRHAGIC INFARCTION OF BRAIN: ICD-10-CM

## 2023-06-21 DIAGNOSIS — R07.9 CHEST PAIN: ICD-10-CM

## 2023-06-21 DIAGNOSIS — F17.200 SMOKER: Chronic | ICD-10-CM

## 2023-06-21 DIAGNOSIS — J44.9 CHRONIC OBSTRUCTIVE PULMONARY DISEASE, UNSPECIFIED COPD TYPE: ICD-10-CM

## 2023-06-21 DIAGNOSIS — I51.81 TAKOTSUBO CARDIOMYOPATHY: ICD-10-CM

## 2023-06-21 DIAGNOSIS — I61.9 CEREBRAL HEMORRHAGE: Primary | ICD-10-CM

## 2023-06-21 DIAGNOSIS — I63.9 STROKE: ICD-10-CM

## 2023-06-21 PROBLEM — G93.89 BRAIN MASS: Status: ACTIVE | Noted: 2023-06-21

## 2023-06-21 LAB
ABO + RH BLD: NORMAL
ALBUMIN SERPL BCP-MCNC: 3.9 G/DL (ref 3.5–5.2)
ALLENS TEST: ABNORMAL
ALLENS TEST: ABNORMAL
ALP SERPL-CCNC: 55 U/L (ref 55–135)
ALT SERPL W/O P-5'-P-CCNC: 23 U/L (ref 10–44)
ANION GAP SERPL CALC-SCNC: 17 MMOL/L (ref 8–16)
APTT PPP: 22.1 SEC (ref 21–32)
AST SERPL-CCNC: 76 U/L (ref 10–40)
BASOPHILS # BLD AUTO: 0.01 K/UL (ref 0–0.2)
BASOPHILS NFR BLD: 0.1 % (ref 0–1.9)
BILIRUB SERPL-MCNC: 1.1 MG/DL (ref 0.1–1)
BLD GP AB SCN CELLS X3 SERPL QL: NORMAL
BNP SERPL-MCNC: 919 PG/ML (ref 0–99)
BUN SERPL-MCNC: 16 MG/DL (ref 8–23)
CALCIUM SERPL-MCNC: 8.9 MG/DL (ref 8.7–10.5)
CHLORIDE SERPL-SCNC: 105 MMOL/L (ref 95–110)
CK SERPL-CCNC: 3441 U/L (ref 20–180)
CO2 SERPL-SCNC: 19 MMOL/L (ref 23–29)
CREAT SERPL-MCNC: 0.8 MG/DL (ref 0.5–1.4)
D DIMER PPP IA.FEU-MCNC: 3.4 MG/L FEU
DELSYS: ABNORMAL
DIFFERENTIAL METHOD: ABNORMAL
EOSINOPHIL # BLD AUTO: 0 K/UL (ref 0–0.5)
EOSINOPHIL NFR BLD: 0 % (ref 0–8)
ERYTHROCYTE [DISTWIDTH] IN BLOOD BY AUTOMATED COUNT: 11.9 % (ref 11.5–14.5)
EST. GFR  (NO RACE VARIABLE): >60 ML/MIN/1.73 M^2
GLUCOSE SERPL-MCNC: 102 MG/DL (ref 70–110)
HCO3 UR-SCNC: 21.1 MMOL/L (ref 24–28)
HCT VFR BLD AUTO: 44.6 % (ref 37–48.5)
HGB BLD-MCNC: 14.9 G/DL (ref 12–16)
IMM GRANULOCYTES # BLD AUTO: 0.06 K/UL (ref 0–0.04)
IMM GRANULOCYTES NFR BLD AUTO: 0.5 % (ref 0–0.5)
INR PPP: 1 (ref 0.8–1.2)
LDH SERPL L TO P-CCNC: 2.74 MMOL/L (ref 0.5–2.2)
LYMPHOCYTES # BLD AUTO: 1.6 K/UL (ref 1–4.8)
LYMPHOCYTES NFR BLD: 13 % (ref 18–48)
MCH RBC QN AUTO: 36.4 PG (ref 27–31)
MCHC RBC AUTO-ENTMCNC: 33.4 G/DL (ref 32–36)
MCV RBC AUTO: 109 FL (ref 82–98)
MONOCYTES # BLD AUTO: 0.7 K/UL (ref 0.3–1)
MONOCYTES NFR BLD: 5.6 % (ref 4–15)
NEUTROPHILS # BLD AUTO: 9.8 K/UL (ref 1.8–7.7)
NEUTROPHILS NFR BLD: 80.8 % (ref 38–73)
NRBC BLD-RTO: 0 /100 WBC
PCO2 BLDA: 34.5 MMHG (ref 35–45)
PH SMN: 7.39 [PH] (ref 7.35–7.45)
PLATELET # BLD AUTO: 258 K/UL (ref 150–450)
PMV BLD AUTO: 10.4 FL (ref 9.2–12.9)
PO2 BLDA: 75 MMHG (ref 80–100)
POC BE: -4 MMOL/L
POC SATURATED O2: 95 % (ref 95–100)
POC TCO2: 22 MMOL/L (ref 23–27)
POTASSIUM SERPL-SCNC: 3.3 MMOL/L (ref 3.5–5.1)
PROT SERPL-MCNC: 7.6 G/DL (ref 6–8.4)
PROTHROMBIN TIME: 10.8 SEC (ref 9–12.5)
RBC # BLD AUTO: 4.09 M/UL (ref 4–5.4)
SAMPLE: ABNORMAL
SAMPLE: ABNORMAL
SITE: ABNORMAL
SITE: ABNORMAL
SODIUM SERPL-SCNC: 141 MMOL/L (ref 136–145)
SPECIMEN OUTDATE: NORMAL
TROPONIN I SERPL DL<=0.01 NG/ML-MCNC: 2.59 NG/ML (ref 0–0.03)
TROPONIN I SERPL DL<=0.01 NG/ML-MCNC: 2.68 NG/ML (ref 0–0.03)
TROPONIN I SERPL DL<=0.01 NG/ML-MCNC: 3.15 NG/ML (ref 0–0.03)
TROPONIN I SERPL DL<=0.01 NG/ML-MCNC: 3.36 NG/ML (ref 0–0.03)
WBC # BLD AUTO: 12.06 K/UL (ref 3.9–12.7)

## 2023-06-21 PROCEDURE — 25500020 PHARM REV CODE 255: Performed by: EMERGENCY MEDICINE

## 2023-06-21 PROCEDURE — 36600 WITHDRAWAL OF ARTERIAL BLOOD: CPT

## 2023-06-21 PROCEDURE — 93010 EKG 12-LEAD: ICD-10-PCS | Mod: ,,, | Performed by: INTERNAL MEDICINE

## 2023-06-21 PROCEDURE — 99285 EMERGENCY DEPT VISIT HI MDM: CPT | Mod: 25

## 2023-06-21 PROCEDURE — 99284 PR EMERGENCY DEPT VISIT,LEVEL IV: ICD-10-PCS | Mod: ,,, | Performed by: PHYSICIAN ASSISTANT

## 2023-06-21 PROCEDURE — 85610 PROTHROMBIN TIME: CPT | Performed by: STUDENT IN AN ORGANIZED HEALTH CARE EDUCATION/TRAINING PROGRAM

## 2023-06-21 PROCEDURE — 80053 COMPREHEN METABOLIC PANEL: CPT | Performed by: EMERGENCY MEDICINE

## 2023-06-21 PROCEDURE — 85730 THROMBOPLASTIN TIME PARTIAL: CPT | Performed by: STUDENT IN AN ORGANIZED HEALTH CARE EDUCATION/TRAINING PROGRAM

## 2023-06-21 PROCEDURE — 86900 BLOOD TYPING SEROLOGIC ABO: CPT | Performed by: STUDENT IN AN ORGANIZED HEALTH CARE EDUCATION/TRAINING PROGRAM

## 2023-06-21 PROCEDURE — 82803 BLOOD GASES ANY COMBINATION: CPT

## 2023-06-21 PROCEDURE — 25000003 PHARM REV CODE 250

## 2023-06-21 PROCEDURE — 25000003 PHARM REV CODE 250: Performed by: HOSPITALIST

## 2023-06-21 PROCEDURE — A9585 GADOBUTROL INJECTION: HCPCS | Performed by: EMERGENCY MEDICINE

## 2023-06-21 PROCEDURE — 83605 ASSAY OF LACTIC ACID: CPT

## 2023-06-21 PROCEDURE — 84484 ASSAY OF TROPONIN QUANT: CPT | Mod: 91 | Performed by: EMERGENCY MEDICINE

## 2023-06-21 PROCEDURE — 25000003 PHARM REV CODE 250: Performed by: EMERGENCY MEDICINE

## 2023-06-21 PROCEDURE — 83880 ASSAY OF NATRIURETIC PEPTIDE: CPT | Performed by: EMERGENCY MEDICINE

## 2023-06-21 PROCEDURE — 94644 CONT INHLJ TX 1ST HOUR: CPT

## 2023-06-21 PROCEDURE — 81001 URINALYSIS AUTO W/SCOPE: CPT | Performed by: INTERNAL MEDICINE

## 2023-06-21 PROCEDURE — 96374 THER/PROPH/DIAG INJ IV PUSH: CPT

## 2023-06-21 PROCEDURE — 93005 ELECTROCARDIOGRAM TRACING: CPT

## 2023-06-21 PROCEDURE — 85379 FIBRIN DEGRADATION QUANT: CPT | Performed by: EMERGENCY MEDICINE

## 2023-06-21 PROCEDURE — 87154 CUL TYP ID BLD PTHGN 6+ TRGT: CPT | Performed by: INTERNAL MEDICINE

## 2023-06-21 PROCEDURE — 63600175 PHARM REV CODE 636 W HCPCS: Performed by: STUDENT IN AN ORGANIZED HEALTH CARE EDUCATION/TRAINING PROGRAM

## 2023-06-21 PROCEDURE — 99291 CRITICAL CARE FIRST HOUR: CPT

## 2023-06-21 PROCEDURE — 84484 ASSAY OF TROPONIN QUANT: CPT | Performed by: EMERGENCY MEDICINE

## 2023-06-21 PROCEDURE — 99900035 HC TECH TIME PER 15 MIN (STAT)

## 2023-06-21 PROCEDURE — 20000000 HC ICU ROOM

## 2023-06-21 PROCEDURE — 99291 PR CRITICAL CARE, E/M 30-74 MINUTES: ICD-10-PCS | Mod: ,,, | Performed by: EMERGENCY MEDICINE

## 2023-06-21 PROCEDURE — 99291 CRITICAL CARE FIRST HOUR: CPT | Mod: ,,, | Performed by: EMERGENCY MEDICINE

## 2023-06-21 PROCEDURE — 63600175 PHARM REV CODE 636 W HCPCS

## 2023-06-21 PROCEDURE — 96360 HYDRATION IV INFUSION INIT: CPT

## 2023-06-21 PROCEDURE — 93010 ELECTROCARDIOGRAM REPORT: CPT | Mod: ,,, | Performed by: INTERNAL MEDICINE

## 2023-06-21 PROCEDURE — 99291 PR CRITICAL CARE, E/M 30-74 MINUTES: ICD-10-PCS | Mod: GC,,, | Performed by: INTERNAL MEDICINE

## 2023-06-21 PROCEDURE — 99284 EMERGENCY DEPT VISIT MOD MDM: CPT | Mod: ,,, | Performed by: PHYSICIAN ASSISTANT

## 2023-06-21 PROCEDURE — 12000002 HC ACUTE/MED SURGE SEMI-PRIVATE ROOM

## 2023-06-21 PROCEDURE — 99291 CRITICAL CARE FIRST HOUR: CPT | Mod: GC,,, | Performed by: INTERNAL MEDICINE

## 2023-06-21 PROCEDURE — 87040 BLOOD CULTURE FOR BACTERIA: CPT | Performed by: INTERNAL MEDICINE

## 2023-06-21 PROCEDURE — 25000242 PHARM REV CODE 250 ALT 637 W/ HCPCS: Performed by: EMERGENCY MEDICINE

## 2023-06-21 PROCEDURE — 84484 ASSAY OF TROPONIN QUANT: CPT | Mod: 91 | Performed by: INTERNAL MEDICINE

## 2023-06-21 PROCEDURE — 87077 CULTURE AEROBIC IDENTIFY: CPT | Mod: 59 | Performed by: INTERNAL MEDICINE

## 2023-06-21 PROCEDURE — 51798 US URINE CAPACITY MEASURE: CPT

## 2023-06-21 PROCEDURE — 85025 COMPLETE CBC W/AUTO DIFF WBC: CPT | Performed by: EMERGENCY MEDICINE

## 2023-06-21 PROCEDURE — 87186 SC STD MICRODIL/AGAR DIL: CPT | Performed by: INTERNAL MEDICINE

## 2023-06-21 PROCEDURE — 96375 TX/PRO/DX INJ NEW DRUG ADDON: CPT

## 2023-06-21 PROCEDURE — 82550 ASSAY OF CK (CPK): CPT | Performed by: EMERGENCY MEDICINE

## 2023-06-21 RX ORDER — LEVETIRACETAM 500 MG/1
500 TABLET ORAL 2 TIMES DAILY
Status: DISCONTINUED | OUTPATIENT
Start: 2023-06-21 | End: 2023-06-28 | Stop reason: HOSPADM

## 2023-06-21 RX ORDER — AMOXICILLIN 250 MG
1 CAPSULE ORAL 2 TIMES DAILY
Status: DISCONTINUED | OUTPATIENT
Start: 2023-06-21 | End: 2023-06-28 | Stop reason: HOSPADM

## 2023-06-21 RX ORDER — GLUCAGON 1 MG
1 KIT INJECTION
Status: DISCONTINUED | OUTPATIENT
Start: 2023-06-21 | End: 2023-06-28 | Stop reason: HOSPADM

## 2023-06-21 RX ORDER — POLYETHYLENE GLYCOL 3350 17 G/17G
17 POWDER, FOR SOLUTION ORAL DAILY
Status: DISCONTINUED | OUTPATIENT
Start: 2023-06-22 | End: 2023-06-28 | Stop reason: HOSPADM

## 2023-06-21 RX ORDER — FUROSEMIDE 10 MG/ML
80 INJECTION INTRAMUSCULAR; INTRAVENOUS
Status: DISCONTINUED | OUTPATIENT
Start: 2023-06-22 | End: 2023-06-22

## 2023-06-21 RX ORDER — GADOBUTROL 604.72 MG/ML
8 INJECTION INTRAVENOUS
Status: COMPLETED | OUTPATIENT
Start: 2023-06-21 | End: 2023-06-21

## 2023-06-21 RX ORDER — CLOPIDOGREL 300 MG/1
600 TABLET, FILM COATED ORAL
Status: COMPLETED | OUTPATIENT
Start: 2023-06-21 | End: 2023-06-21

## 2023-06-21 RX ORDER — ATORVASTATIN CALCIUM 40 MG/1
40 TABLET, FILM COATED ORAL DAILY
Status: DISCONTINUED | OUTPATIENT
Start: 2023-06-22 | End: 2023-06-28 | Stop reason: HOSPADM

## 2023-06-21 RX ORDER — ALBUTEROL SULFATE 2.5 MG/.5ML
10 SOLUTION RESPIRATORY (INHALATION)
Status: COMPLETED | OUTPATIENT
Start: 2023-06-21 | End: 2023-06-21

## 2023-06-21 RX ORDER — CLOPIDOGREL BISULFATE 75 MG/1
75 TABLET ORAL NIGHTLY
Status: DISCONTINUED | OUTPATIENT
Start: 2023-06-22 | End: 2023-06-22

## 2023-06-21 RX ORDER — FUROSEMIDE 10 MG/ML
80 INJECTION INTRAMUSCULAR; INTRAVENOUS ONCE
Status: COMPLETED | OUTPATIENT
Start: 2023-06-21 | End: 2023-06-21

## 2023-06-21 RX ORDER — ASPIRIN 325 MG
325 TABLET ORAL ONCE
Status: COMPLETED | OUTPATIENT
Start: 2023-06-21 | End: 2023-06-21

## 2023-06-21 RX ORDER — NALOXONE HCL 0.4 MG/ML
0.02 VIAL (ML) INJECTION
Status: DISCONTINUED | OUTPATIENT
Start: 2023-06-21 | End: 2023-06-28 | Stop reason: HOSPADM

## 2023-06-21 RX ORDER — DEXTROSE 40 %
24 GEL (GRAM) ORAL
Status: DISCONTINUED | OUTPATIENT
Start: 2023-06-21 | End: 2023-06-28 | Stop reason: HOSPADM

## 2023-06-21 RX ORDER — SODIUM CHLORIDE 0.9 % (FLUSH) 0.9 %
10 SYRINGE (ML) INJECTION EVERY 12 HOURS PRN
Status: DISCONTINUED | OUTPATIENT
Start: 2023-06-21 | End: 2023-06-28 | Stop reason: HOSPADM

## 2023-06-21 RX ORDER — NAPROXEN SODIUM 220 MG
220 TABLET ORAL 2 TIMES DAILY PRN
Status: ON HOLD | COMMUNITY
End: 2023-06-28 | Stop reason: HOSPADM

## 2023-06-21 RX ORDER — MUPIROCIN 20 MG/G
OINTMENT TOPICAL 2 TIMES DAILY
Status: DISPENSED | OUTPATIENT
Start: 2023-06-22 | End: 2023-06-27

## 2023-06-21 RX ORDER — POTASSIUM CHLORIDE 7.45 MG/ML
10 INJECTION INTRAVENOUS
Status: DISPENSED | OUTPATIENT
Start: 2023-06-22 | End: 2023-06-22

## 2023-06-21 RX ORDER — CLOPIDOGREL 300 MG/1
600 TABLET, FILM COATED ORAL ONCE
Status: DISCONTINUED | OUTPATIENT
Start: 2023-06-21 | End: 2023-06-21 | Stop reason: SDUPTHER

## 2023-06-21 RX ORDER — NICARDIPINE HYDROCHLORIDE 0.2 MG/ML
0-15 INJECTION INTRAVENOUS CONTINUOUS
Status: DISCONTINUED | OUTPATIENT
Start: 2023-06-21 | End: 2023-06-21 | Stop reason: HOSPADM

## 2023-06-21 RX ORDER — ACETAMINOPHEN 325 MG/1
650 TABLET ORAL EVERY 4 HOURS PRN
Status: DISCONTINUED | OUTPATIENT
Start: 2023-06-21 | End: 2023-06-28 | Stop reason: HOSPADM

## 2023-06-21 RX ORDER — ASPIRIN 81 MG/1
81 TABLET ORAL DAILY
Status: DISCONTINUED | OUTPATIENT
Start: 2023-06-22 | End: 2023-06-22

## 2023-06-21 RX ORDER — ONDANSETRON 2 MG/ML
4 INJECTION INTRAMUSCULAR; INTRAVENOUS EVERY 8 HOURS PRN
Status: DISCONTINUED | OUTPATIENT
Start: 2023-06-21 | End: 2023-06-28 | Stop reason: HOSPADM

## 2023-06-21 RX ORDER — LORAZEPAM 2 MG/ML
0.5 INJECTION INTRAMUSCULAR
Status: COMPLETED | OUTPATIENT
Start: 2023-06-21 | End: 2023-06-21

## 2023-06-21 RX ORDER — DEXTROSE 40 %
16 GEL (GRAM) ORAL
Status: DISCONTINUED | OUTPATIENT
Start: 2023-06-21 | End: 2023-06-28 | Stop reason: HOSPADM

## 2023-06-21 RX ADMIN — SODIUM CHLORIDE 1000 ML: 9 INJECTION, SOLUTION INTRAVENOUS at 03:06

## 2023-06-21 RX ADMIN — FUROSEMIDE 80 MG: 10 INJECTION, SOLUTION INTRAMUSCULAR; INTRAVENOUS at 10:06

## 2023-06-21 RX ADMIN — LORAZEPAM 0.5 MG: 2 INJECTION INTRAMUSCULAR; INTRAVENOUS at 07:06

## 2023-06-21 RX ADMIN — CLOPIDOGREL BISULFATE 600 MG: 300 TABLET, FILM COATED ORAL at 10:06

## 2023-06-21 RX ADMIN — GADOBUTROL 8 ML: 604.72 INJECTION INTRAVENOUS at 08:06

## 2023-06-21 RX ADMIN — ALBUTEROL SULFATE 10 MG: 2.5 SOLUTION RESPIRATORY (INHALATION) at 12:06

## 2023-06-21 RX ADMIN — ASPIRIN 325 MG ORAL TABLET 325 MG: 325 PILL ORAL at 10:06

## 2023-06-21 RX ADMIN — LEVETIRACETAM 500 MG: 500 TABLET, FILM COATED ORAL at 10:06

## 2023-06-21 RX ADMIN — IOHEXOL 100 ML: 350 INJECTION, SOLUTION INTRAVENOUS at 02:06

## 2023-06-21 NOTE — ED PROVIDER NOTES
Encounter Date: 6/21/2023       History     Chief Complaint   Patient presents with    Fall     Arrives via EMS transfer from [facility] needing neuro consult- pt had a fall at home and CT scan resulted with a SDH      Ms. Beck is a 72-year-old female with past medical history of asthma as well as COPD who presents to the  emergency department as a transfer from [place] for neurosurgery evaluation after patient was found to have a possible subdural hematoma. She stated that earlier today she had been walking in her friend's room when she had tripped and fallen hitting the back of her head.  She did not lose consciousness and did not have any vomiting but she was having a headache and so she was taken to [facility] at which time lab workup was done at a CT scan showed what appeared to be a subdural.  Patient denies any other concerns    The history is provided by the patient and the EMS personnel.   Review of patient's allergies indicates:   Allergen Reactions    Penicillins Other (See Comments)     Past Medical History:   Diagnosis Date    Asthma     COPD (chronic obstructive pulmonary disease)      History reviewed. No pertinent surgical history.  History reviewed. No pertinent family history.  Social History     Tobacco Use    Smoking status: Every Day    Smokeless tobacco: Never   Substance Use Topics    Alcohol use: Yes     Review of Systems   Constitutional:  Negative for chills, diaphoresis, fatigue and fever.   HENT:  Negative for congestion, rhinorrhea and sore throat.    Eyes:  Negative for visual disturbance.   Respiratory:  Negative for cough, chest tightness and shortness of breath.    Cardiovascular:  Negative for chest pain.   Gastrointestinal:  Negative for abdominal pain, blood in stool, constipation, diarrhea and vomiting.   Genitourinary:  Negative for dysuria, hematuria and urgency.   Musculoskeletal:  Negative for back pain.   Skin:  Negative for rash.   Neurological:  Positive for headaches.  Negative for seizures and syncope.   Hematological:  Does not bruise/bleed easily.   Psychiatric/Behavioral:  Negative for agitation and hallucinations.      Physical Exam     Initial Vitals   BP Pulse Resp Temp SpO2   06/21/23 1656 06/21/23 1656 06/21/23 1656 06/21/23 1657 06/21/23 1656   115/63 67 20 98.4 °F (36.9 °C) 98 %      MAP       --                Physical Exam     Nursing note and vitals reviewed.  Constitutional: Patient appears well-developed and well-nourished.  Patient is oriented to self only  HENT:   Head: Normocephalic and atraumatic.   Eyes: Conjunctivae and EOM are normal. Pupils are equal, round, and reactive to light. no scleral icterus, no periorbital edema or ecchymosis  Neck: Neck supple. no stridor, no masses, no drooling or voice changes  Normal range of motion.  Cardiovascular: Normal rate, regular rhythm, normal heart sounds and intact distal pulses. no m/r/g  Pulmonary/Chest: Tachypnea, CTAB, no wheezes, rales or rhonchi, no increased work of breathing  Abdominal: Abdomen is soft. Patient exhibits no distension. There is no abdominal tenderness. no organomegaly, no CVAT  Musculoskeletal:      Cervical back: Normal range of motion and neck supple.   Neurological: Patient is alert and oriented to person, place, and time. No cranial nerve deficit.  GCS score is 15. Moving all extremities, face grossly symmetric  Skin: Skin is warm and dry.  Ext: no edema, no lesions, rashes, or deformity  Psych: Normal mood/affect,cooperative, well groomed, makes good eye contact      ED Course   Critical Care    Date/Time: 6/21/2023 8:45 PM  Performed by: Reagan Davis DO  Authorized by: Reagan Davis DO   Direct patient critical care time: 15 minutes  Additional history critical care time: 15 minutes  Ordering / reviewing critical care time: 25 minutes  Documentation critical care time: 10 minutes  Consulting other physicians critical care time: 15 minutes  Total critical care time (exclusive of  procedural time) : 80 minutes  Critical care was necessary to treat or prevent imminent or life-threatening deterioration of the following conditions: cardiac failure and CNS failure or compromise.  Critical care was time spent personally by me on the following activities: blood draw for specimens, development of treatment plan with patient or surrogate, discussions with consultants, evaluation of patient's response to treatment, examination of patient, obtaining history from patient or surrogate, ordering and performing treatments and interventions, ordering and review of laboratory studies, ordering and review of radiographic studies, pulse oximetry, re-evaluation of patient's condition and review of old charts.      Labs Reviewed   TROPONIN I - Abnormal; Notable for the following components:       Result Value    Troponin I 3.151 (*)     All other components within normal limits   B-TYPE NATRIURETIC PEPTIDE - Abnormal; Notable for the following components:     (*)     All other components within normal limits   TROPONIN I - Abnormal; Notable for the following components:    Troponin I 2.587 (*)     All other components within normal limits   URINALYSIS, REFLEX TO URINE CULTURE - Abnormal; Notable for the following components:    Specific Gravity, UA >1.030 (*)     Protein, UA 1+ (*)     Ketones, UA 1+ (*)     Occult Blood UA Trace (*)     Nitrite, UA Positive (*)     All other components within normal limits    Narrative:     Specimen Source->Urine   LACTIC ACID, PLASMA - Abnormal; Notable for the following components:    Lactate (Lactic Acid) 2.8 (*)     All other components within normal limits   ISTAT LACTATE - Abnormal; Notable for the following components:    POC Lactate 2.74 (*)     All other components within normal limits   CULTURE, BLOOD   CULTURE, BLOOD   PROTIME-INR   APTT   B-TYPE NATRIURETIC PEPTIDE   TROPONIN I   B-TYPE NATRIURETIC PEPTIDE   TROPONIN I   URINALYSIS MICROSCOPIC    Narrative:      Specimen Source->Urine   TYPE & SCREEN     EKG Readings: (Independently Interpreted)   Initial Reading: No STEMI. Rhythm: Normal Sinus Rhythm. Heart Rate: 76. Conduction: LBBB. ST Segments: Non-Specific ST Segment Depression.     Imaging Results              MRI Brain W WO Contrast (Final result)  Result time 06/21/23 23:40:51      Final result by Norbert Killian MD (06/21/23 23:40:51)                   Impression:      Moderate sized right frontal hemorrhagic infarction, similar to prior CT.  No underlying mass lesions appreciated.  Follow up examination as clinically indicated.    Electronically signed by resident: Thierno Young  Date:    06/21/2023  Time:    23:20    Electronically signed by: Norbert Killian MD  Date:    06/21/2023  Time:    23:40               Narrative:    EXAMINATION:  MRI BRAIN W WO CONTRAST    CLINICAL HISTORY:  Head trauma, minor (Age >= 65y);.    TECHNIQUE:  Multiplanar multisequence MR imaging of the brain was performed before and after the administration of 8 mL Gadavist  intravenous contrast.    There are some mild motion limitations to the examination.    COMPARISON:  CT head 06/21/2023    FINDINGS:  Intracranial compartment:    Prominence of the ventricles and sulci compatible with mild generalized cerebral volume loss.  No hydrocephalus. No extra-axial blood or fluid collections.    Redemonstration of the moderate sized hemorrhagic infarction involving the right frontal lobe demonstrating diffusion restriction and susceptibility artifact. No underlying enhancement to suggest mass lesion. Mild surrounding edema and localized mass effect without significant midline shift.    Small remote bilateral cerebellar infarcts.    Normal vascular flow voids are preserved.    Skull/extracranial contents (limited evaluation):    Hyperostosis frontalis interna.  Bone marrow signal intensity is normal.                                    X-Rays:   Independently Interpreted Readings:   Other  Readings:  MRI brain:  Concern for moderate size right frontal hemorrhagic infarction  Medications   levETIRAcetam tablet 500 mg (500 mg Oral Given 6/21/23 2228)   sodium chloride 0.9% flush 10 mL (has no administration in time range)   naloxone 0.4 mg/mL injection 0.02 mg (has no administration in time range)   dextrose 40 % gel 16,000 mg (has no administration in time range)   dextrose 40 % gel 24,000 mg (has no administration in time range)   dextrose 10% bolus 125 mL 125 mL (has no administration in time range)   dextrose 10% bolus 250 mL 250 mL (has no administration in time range)   glucagon (human recombinant) injection 1 mg (has no administration in time range)   polyethylene glycol packet 17 g (has no administration in time range)   senna-docusate 8.6-50 mg per tablet 1 tablet (1 tablet Oral Not Given 6/21/23 2315)   acetaminophen tablet 650 mg (has no administration in time range)   ondansetron injection 4 mg (has no administration in time range)   clopidogreL tablet 75 mg (has no administration in time range)   aspirin EC tablet 81 mg (has no administration in time range)   furosemide injection 80 mg (has no administration in time range)   potassium chloride 10 mEq in 100 mL IVPB (10 mEq Intravenous New Bag 6/22/23 8517)   atorvastatin tablet 40 mg (has no administration in time range)   mupirocin 2 % ointment (has no administration in time range)   dexmedetomidine (PRECEDEX) 400mcg/100mL 0.9% NaCL infusion (0.4 mcg/kg/hr × 70.3 kg Intravenous New Bag 6/22/23 0637)   albuterol-ipratropium 2.5 mg-0.5 mg/3 mL nebulizer solution 3 mL (3 mLs Nebulization Given 6/22/23 0501)   LORazepam injection 0.5 mg (0.5 mg Intravenous Given 6/21/23 1915)   gadobutroL (GADAVIST) injection 8 mL (8 mLs Intravenous Given 6/21/23 2040)   aspirin tablet 325 mg (325 mg Oral Given 6/21/23 2228)   furosemide injection 80 mg (80 mg Intravenous Given 6/21/23 2228)   clopidogreL tablet 600 mg (600 mg Oral Given 6/21/23 2233)    furosemide injection 80 mg (80 mg Intravenous Given 6/22/23 0516)     Medical Decision Making:   History:   Old Medical Records: I decided to obtain old medical records.  Old Records Summarized: records from another hospital.       <> Summary of Records: 6/21/23:  Transfer from [facility] for neurosurgery evaluation of potential intracranial hemorrhage versus mass with elevated troponin  Initial Assessment:   Ms. Beck is a 72-year-old female with past medical history of asthma as well as COPD who presents to the  emergency department as a transfer from [place] for neurosurgery evaluation after patient was found to have a possible subdural hematoma.  Differential Diagnosis:   ACS  Intracranial hemorrhage  Malignancy  Sepsis  Independently Interpreted Test(s):   I have ordered and independently interpreted X-rays - see prior notes.  I have ordered and independently interpreted EKG Reading(s) - see prior notes  Clinical Tests:   Lab Tests: Ordered and Reviewed  Radiological Study: Reviewed and Ordered  Medical Tests: Ordered and Reviewed  ED Management:  Patient was thoroughly examined,   Discussion with neurosurgery who requested that patient get an MRI but they stated that they did not feel it was a bleed and was likely a tumor  Labs from outside facility showed an elevated troponin and so troponin was repeated and was still elevated patient's EKG did not show any new significant findings  I was concerned that patient would not be able to tell so she was having chest pain and that given her elevated troponin at 1 to start ACS discussion was had with Cardiology who evaluated the patient  Cardiology felt that ACS should be started on nurse neurosurgery had an objection discussion was had with neurosurgery who stated that although it would not be advisable they felt if patient's heart was at risk ACS was fine  Discussion was had also medicine and patient was admitted to CCU  Other:   I have discussed this case  with another health care provider.       <> Summary of the Discussion: Neurosurgery and Cardiology          Attending Attestation:   Physician Attestation Statement for Resident:  As the supervising MD   Physician Attestation Statement: I have personally seen and examined this patient.   I agree with the above history.  -:   As the supervising MD I agree with the above PE.     As the supervising MD I agree with the above treatment, course, plan, and disposition.                  I have reviewed and concur with the resident's history, physical, assessment, and plan.  I have personally interviewed and examined the patient at bedside.   I did supervise any and all procedures and was present for any critical portion, and was always immediately available for help and as a resource.     The above history physical, review of symptoms, HPI and physical exam reflect my independent interpretation and evaluation.    Briefly, 72-year-old female transferred from [facility] for concern for traumatic versus acute intracranial hemorrhage and elevated cardiac biomarkers.  Patient has dementia not able to provide much history.  ECG with left bundle-branch block but no STEMI on my read.  Placed on cardiac and telemetry monitoring including pulse oximetry.  Discussed case with Neurosurgery who obtain MRI showing hemorrhagic infarction and recommend against anticoagulation.  Discussed case with Cardiology given elevated cardiac biomarkers, they will follow along and make recommendations including admission to CCU.  Please see critical care note for critical care time.  Please see below for Cardiology recommendations.    Pt admitted to CCU for further management of ADHF, NSTEMI, and ICH.     Given concern for ACS, DAPT with ASA and Plavix was initiated. Spoke with neurosurgery regarding initiation of heparin in setting of findings of head imaging.   Recommendation was to assess risk vs benefit knowing there is a possibility of  worsening ICH if heparin is initiated. If initiated to repeat CTH when ptt in therapeutic range.  Also NSGY was inquired about initiation of decadron which they did not recommend as there's no concern for vasogenic edema per their evaluation of images.     Spoke with CCU staff.  Will hold off on heparin for time being.  Will order EEG  Cont keppra  Will repeat lactate and monitor closely. If cont to rise will place central access for hemodynamic monitoring.    Complexity: critical care    Final diagnoses:  [R77.8] Elevated troponin (Primary)  [I63.89] Acute hemorrhagic infarction of brain     Reagan Davis DO, FAAEM  Emergency Staff Physician   Dept of Emergency Medicine   [facility]  Spectralink: [id number]        Disclaimer: This note has been generated using voice-recognition software. There may be typographical errors that have been missed during proof-reading.            ED Course as of 06/22/23 0649   Wed Jun 21, 2023   1713 Discussion had with neurosurgery who will come see patient.  [OO]   2131 Discussion had with Cardiology who will see patient [OO]      ED Course User Index  [OO] Moni Cao MD                 Clinical Impression:   Final diagnoses:  [R77.8] Elevated troponin (Primary)  [I63.89] Acute hemorrhagic infarction of brain        ED Disposition Condition    Admit Stable                Moni Cao MD  Resident  06/21/23 2231       Reagan Davis DO  06/22/23 0651       Reagan Davis DO  06/22/23 0653

## 2023-06-21 NOTE — ED TRIAGE NOTES
Patient is a 72 year old female that presents to the ED via EMS with needing neuro consult for a SDH coming from [facility]. Pt is confused to place.

## 2023-06-21 NOTE — SUBJECTIVE & OBJECTIVE
(Not in a hospital admission)      Review of patient's allergies indicates:   Allergen Reactions    Penicillins Other (See Comments)       Past Medical History:   Diagnosis Date    Asthma     COPD (chronic obstructive pulmonary disease)      History reviewed. No pertinent surgical history.  Family History    None       Tobacco Use    Smoking status: Every Day    Smokeless tobacco: Never   Substance and Sexual Activity    Alcohol use: Yes    Drug use: Not on file    Sexual activity: Not on file     Review of Systems   Constitutional:  Negative for chills, fatigue, fever and unexpected weight change.   Eyes:  Negative for photophobia and visual disturbance.   Respiratory:  Positive for shortness of breath. Negative for chest tightness.    Cardiovascular:  Negative for chest pain.   Gastrointestinal:  Positive for diarrhea. Negative for nausea and vomiting.   Musculoskeletal:  Negative for back pain, gait problem and neck pain.   Neurological:  Positive for tremors. Negative for dizziness, seizures, facial asymmetry, speech difficulty, weakness, numbness and headaches.   Psychiatric/Behavioral:  Negative for confusion.        Objective:     Weight: 70.3 kg (155 lb)  Body mass index is 26.61 kg/m².  Vital Signs (Most Recent):  Temp: 98.7 °F (37.1 °C) (06/21/23 1702)  Pulse: 77 (06/21/23 1702)  Resp: 19 (06/21/23 1702)  BP: 115/63 (06/21/23 1702)  SpO2: 95 % (06/21/23 1702) Vital Signs (24h Range):  Temp:  [98 °F (36.7 °C)-98.7 °F (37.1 °C)] 98.7 °F (37.1 °C)  Pulse:  [67-91] 77  Resp:  [18-46] 19  SpO2:  [94 %-99 %] 95 %  BP: (106-143)/(53-78) 115/63                                 Physical Exam     Neurosurgery Physical Exam    General: well developed, well nourished, no distress.   Head: normocephalic, atraumatic  Neck: No tracheal deviation.   Neurologic: Alert and oriented. Thought content appropriate.  GCS: Motor: 6/Verbal: 5/Eyes: 4 GCS Total: 15  Mental Status: Awake, Alert, Oriented x 4  Language: No  aphasia  Speech: No dysarthria  Cranial nerves: face symmetric, tongue midline, CN II-XII grossly intact.   Eyes: pupils equal, round, reactive to light with accomodation, EOMI.   Pulmonary: normal respirations, no signs of respiratory distress  Abdomen: soft, non-distended, not tender to palpation  Sensory: intact to light touch throughout  Motor Strength: Moves all extremities spontaneously with good tone.  Full strength upper and lower extremities. Tremors with all movements.    Strength  Deltoids Triceps Biceps Wrist Extension Wrist Flexion Hand    Upper: R 5/5 5/5 5/5 5/5 5/5 5/5    L 5/5 5/5 5/5 5/5 5/5 5/5     Iliopsoas Quadriceps Knee  Flexion Tibialis  anterior Gastro- cnemius EHL   Lower: R 5/5 5/5 5/5 5/5 5/5 5/5    L 5/5 5/5 5/5 5/5 5/5 5/5     Pronator Drift: no drift noted  Finger-to-nose: Intact bilaterally  Vascular: No LE edema.   Skin: Skin is warm, dry and intact.    Cervical ROM: full without pain, no TTP      Significant Labs:  Recent Labs   Lab 06/21/23  1239         K 3.3*      CO2 19*   BUN 16   CREATININE 0.8   CALCIUM 8.9     Recent Labs   Lab 06/21/23  1239   WBC 12.06   HGB 14.9   HCT 44.6        Recent Labs   Lab 06/21/23  1721   INR 1.0   APTT 22.1     Microbiology Results (last 7 days)       ** No results found for the last 168 hours. **          Recent Lab Results         06/21/23  1721   06/21/23  1519   06/21/23  1239   06/21/23  1239        Albumin       3.9       Alkaline Phosphatase       55       Allens Test     Pass         ALT       23       Anion Gap       17       aPTT 22.1  Comment: Refer to local heparin nomogram for intensity/dose specific   therapeutic   range.               AST       76       Baso #       0.01       Basophil %       0.1       BILIRUBIN TOTAL       1.1  Comment: For infants and newborns, interpretation of results should be based  on gestational age, weight and in agreement with clinical  observations.    Premature Infant  recommended reference ranges:  Up to 24 hours.............<8.0 mg/dL  Up to 48 hours............<12.0 mg/dL  3-5 days..................<15.0 mg/dL  6-29 days.................<15.0 mg/dL         Site     RR         BUN       16       Calcium       8.9       Chloride       105       CO2       19       CPK       3441       Creatinine       0.8       D-Dimer       3.40  Comment: The quantitative D-dimer assay should be used as an aid in   the diagnosis of deep vein thrombosis and pulmonary embolism  in patients with the appropriate presentation and clinical  history. The upper limit of the reference interval and the clinical   cut off   point are identical. Causes of a positive (>0.50 mg/L FEU) D-Dimer   test  include, but are not limited to: DVT, PE, DIC, thrombolytic   therapy, anticoagulant therapy, recent surgery, trauma, or   pregnancy, disseminated malignancy, aortic aneurysm, cirrhosis,  and severe infection. False negative results may occur in   patients with distal DVT.  FLORA^612^^19^  LOT^610^[id number]         [facility]     Room Air         Differential Method       Automated       eGFR       >60       Eos #       0.0       Eosinophil %       0.0       Glucose       102       Gran # (ANC)       9.8       Gran %       80.8       Hematocrit       44.6       Hemoglobin       14.9       Immature Grans (Abs)       0.06  Comment: Mild elevation in immature granulocytes is non specific and   can be seen in a variety of conditions including stress response,   acute inflammation, trauma and pregnancy. Correlation with other   laboratory and clinical findings is essential.         Immature Granulocytes       0.5       INR 1.0  Comment: Coumadin Therapy:  2.0 - 3.0 for INR for all indicators except mechanical heart valves  and antiphospholipid syndromes which should use 2.5 - 3.5.               Lymph #       1.6       Lymph %       13.0       MCH       36.4       MCHC       33.4       MCV        109       Mono #       0.7       Mono %       5.6       MPV       10.4       nRBC       0       Platelets       258       POC BE     -4         POC HCO3     21.1         POC PCO2     34.5         POC PH     7.395         POC PO2     75         POC SATURATED O2     95         POC TCO2     22         Potassium       3.3       PROTEIN TOTAL       7.6       Protime 10.8             RBC       4.09       RDW       11.9       Sample     ARTERIAL         Sodium       141       Troponin I   2.679  Comment: The reference interval for Troponin I represents the 99th percentile   cutoff   for our facility and is consistent with 3rd generation assay   performance.       3.363  Comment: The reference interval for Troponin I represents the 99th percentile   cutoff   for our facility and is consistent with 3rd generation assay   performance.         WBC       12.06             All pertinent labs from the last 24 hours have been reviewed.    Significant Diagnostics:  I have reviewed all pertinent imaging results/findings within the past 24 hours.

## 2023-06-21 NOTE — ED NOTES
Patient resting comfortably. Eyes closed, respirations easy and unlabored. Easily arouses to verbal stimuli. Updated on care plan and lab draw. VU. Respiratory at bedside for ABG and nebulizer.

## 2023-06-21 NOTE — CONSULTS
"[facility] - Emergency Dept  Neurosurgery  Consult Note    Inpatient consult to Neurosurgery  Consult performed by: Licha Rajput PA-C  Consult ordered by: Moni Cao MD        Subjective:     Chief Complaint/Reason for Admission: brain mass    History of Present Illness: Patient is a 72F with PMH asthma and COPD who presents for evaluation of ICH vs mass. No family or friends present, patient is somewhat of a poor historian. Reports she woke up on the ground Monday, does not remember falling. She states she went to the ED in [place] and was told she had a head bleed and was discharged home. She presented to the ED today with confusion. Reports worsening SOB x 3 days otherwise asymptomatic. She is tremulous. Denies headache, neck pain, numbness, paresthesias, back pain, hand clumsiness, gait instability, seizures, abdominal pain, weight loss. Reports daily cigarette use. Reports mother  of "cancer in the chest." Also endorses chronic diarrhea. Denies use of antiplatelets or anticoagulants.      (Not in a hospital admission)      Review of patient's allergies indicates:   Allergen Reactions    Penicillins Other (See Comments)       Past Medical History:   Diagnosis Date    Asthma     COPD (chronic obstructive pulmonary disease)      History reviewed. No pertinent surgical history.  Family History    None       Tobacco Use    Smoking status: Every Day    Smokeless tobacco: Never   Substance and Sexual Activity    Alcohol use: Yes    Drug use: Not on file    Sexual activity: Not on file     Review of Systems   Constitutional:  Negative for chills, fatigue, fever and unexpected weight change.   Eyes:  Negative for photophobia and visual disturbance.   Respiratory:  Positive for shortness of breath. Negative for chest tightness.    Cardiovascular:  Negative for chest pain.   Gastrointestinal:  Positive for diarrhea. Negative for nausea and vomiting.   Musculoskeletal:  Negative for back pain, gait " problem and neck pain.   Neurological:  Positive for tremors. Negative for dizziness, seizures, facial asymmetry, speech difficulty, weakness, numbness and headaches.   Psychiatric/Behavioral:  Negative for confusion.        Objective:     Weight: 70.3 kg (155 lb)  Body mass index is 26.61 kg/m².  Vital Signs (Most Recent):  Temp: 98.7 °F (37.1 °C) (06/21/23 1702)  Pulse: 77 (06/21/23 1702)  Resp: 19 (06/21/23 1702)  BP: 115/63 (06/21/23 1702)  SpO2: 95 % (06/21/23 1702) Vital Signs (24h Range):  Temp:  [98 °F (36.7 °C)-98.7 °F (37.1 °C)] 98.7 °F (37.1 °C)  Pulse:  [67-91] 77  Resp:  [18-46] 19  SpO2:  [94 %-99 %] 95 %  BP: (106-143)/(53-78) 115/63                                 Physical Exam     Neurosurgery Physical Exam    General: well developed, well nourished, no distress.   Head: normocephalic, atraumatic  Neck: No tracheal deviation.   Neurologic: Alert and oriented. Thought content appropriate.  GCS: Motor: 6/Verbal: 5/Eyes: 4 GCS Total: 15  Mental Status: Awake, Alert, Oriented x 4  Language: No aphasia  Speech: No dysarthria  Cranial nerves: face symmetric, tongue midline, CN II-XII grossly intact.   Eyes: pupils equal, round, reactive to light with accomodation, EOMI.   Pulmonary: normal respirations, no signs of respiratory distress  Abdomen: soft, non-distended, not tender to palpation  Sensory: intact to light touch throughout  Motor Strength: Moves all extremities spontaneously with good tone.  Full strength upper and lower extremities. Tremors with all movements.    Strength  Deltoids Triceps Biceps Wrist Extension Wrist Flexion Hand    Upper: R 5/5 5/5 5/5 5/5 5/5 5/5    L 5/5 5/5 5/5 5/5 5/5 5/5     Iliopsoas Quadriceps Knee  Flexion Tibialis  anterior Gastro- cnemius EHL   Lower: R 5/5 5/5 5/5 5/5 5/5 5/5    L 5/5 5/5 5/5 5/5 5/5 5/5     Pronator Drift: no drift noted  Finger-to-nose: Intact bilaterally  Vascular: No LE edema.   Skin: Skin is warm, dry and intact.    Cervical ROM: full  without pain, no TTP      Significant Labs:  Recent Labs   Lab 06/21/23  1239         K 3.3*      CO2 19*   BUN 16   CREATININE 0.8   CALCIUM 8.9     Recent Labs   Lab 06/21/23  1239   WBC 12.06   HGB 14.9   HCT 44.6        Recent Labs   Lab 06/21/23  1721   INR 1.0   APTT 22.1     Microbiology Results (last 7 days)       ** No results found for the last 168 hours. **          Recent Lab Results         06/21/23  1721   06/21/23  1519   06/21/23  1239   06/21/23  1239        Albumin       3.9       Alkaline Phosphatase       55       Allens Test     Pass         ALT       23       Anion Gap       17       aPTT 22.1  Comment: Refer to local heparin nomogram for intensity/dose specific   therapeutic   range.               AST       76       Baso #       0.01       Basophil %       0.1       BILIRUBIN TOTAL       1.1  Comment: For infants and newborns, interpretation of results should be based  on gestational age, weight and in agreement with clinical  observations.    Premature Infant recommended reference ranges:  Up to 24 hours.............<8.0 mg/dL  Up to 48 hours............<12.0 mg/dL  3-5 days..................<15.0 mg/dL  6-29 days.................<15.0 mg/dL         Site     RR         BUN       16       Calcium       8.9       Chloride       105       CO2       19       CPK       3441       Creatinine       0.8       D-Dimer       3.40  Comment: The quantitative D-dimer assay should be used as an aid in   the diagnosis of deep vein thrombosis and pulmonary embolism  in patients with the appropriate presentation and clinical  history. The upper limit of the reference interval and the clinical   cut off   point are identical. Causes of a positive (>0.50 mg/L FEU) D-Dimer   test  include, but are not limited to: DVT, PE, DIC, thrombolytic   therapy, anticoagulant therapy, recent surgery, trauma, or   pregnancy, disseminated malignancy, aortic aneurysm, cirrhosis,  and severe infection.  False negative results may occur in   patients with distal DVT.  FLORA^612^^19^  LOT^610^DDiSup^[id number]\DDiBuf^[id number]\DDiReag^[id number]         [facility]     Room Air         Differential Method       Automated       eGFR       >60       Eos #       0.0       Eosinophil %       0.0       Glucose       102       Gran # (ANC)       9.8       Gran %       80.8       Hematocrit       44.6       Hemoglobin       14.9       Immature Grans (Abs)       0.06  Comment: Mild elevation in immature granulocytes is non specific and   can be seen in a variety of conditions including stress response,   acute inflammation, trauma and pregnancy. Correlation with other   laboratory and clinical findings is essential.         Immature Granulocytes       0.5       INR 1.0  Comment: Coumadin Therapy:  2.0 - 3.0 for INR for all indicators except mechanical heart valves  and antiphospholipid syndromes which should use 2.5 - 3.5.               Lymph #       1.6       Lymph %       13.0       MCH       36.4       MCHC       33.4       MCV       109       Mono #       0.7       Mono %       5.6       MPV       10.4       nRBC       0       Platelets       258       POC BE     -4         POC HCO3     21.1         POC PCO2     34.5         POC PH     7.395         POC PO2     75         POC SATURATED O2     95         POC TCO2     22         Potassium       3.3       PROTEIN TOTAL       7.6       Protime 10.8             RBC       4.09       RDW       11.9       Sample     ARTERIAL         Sodium       141       Troponin I   2.679  Comment: The reference interval for Troponin I represents the 99th percentile   cutoff   for our facility and is consistent with 3rd generation assay   performance.       3.363  Comment: The reference interval for Troponin I represents the 99th percentile   cutoff   for our facility and is consistent with 3rd generation assay   performance.         WBC       12.06             All pertinent labs from the last 24 hours have  been reviewed.    Significant Diagnostics:  I have reviewed all pertinent imaging results/findings within the past 24 hours.    Assessment/Plan:     Brain mass  Patient is a 72F with PMH asthma and COPD who presents for evaluation of right frontal hyperdensity with surrounding edema concerning for ICH vs mass after a fall 2 days ago.     --Recommend admission to  for workup of elevated troponin, CPK, metastatic workup  --All labs and diagnostics reviewed  --Q4h neurochecks   --F/u MRI brain w/wo contrast  --CT chest/abdo/pelvis with contrast if MRI brain concerning for mass  --SBP <160  --Na >135  --Keppra 500 BID  --HOB >30  --Follow-up full pre-op labs (CBC/CMP/PT-INR/PTT/T&S)  --Continue to monitor clinically, notify NSGY immediately with any changes in neuro status  --Discussed with Dr. Garnett           Thank you for your consult. I will follow-up with patient. Please contact us if you have any additional questions.    Licha Rajput PA-C  Neurosurgery  [facility] - Emergency Dept

## 2023-06-21 NOTE — ED PROVIDER NOTES
Encounter Date: 6/21/2023       History     Chief Complaint   Patient presents with    Shortness of Breath     Brought in by Brianna from home. SOB x 2 days, diarrhea x 2 days, had fall at home due to lost footing. Respiratory Hx, no cardiac Hx. Reported wheezing and rhonchi with EMS. Received Duo-Neb, total of 4 albuteral, 125 solumedrol from EMS.     Svetlana Beck is a 72 y.o. female who  has a past medical history of Asthma and COPD (chronic obstructive pulmonary disease).    The patient presents to the ED due to fall shortness of breath and intermittent confusion.She has been having shortness of breath for a few days.   Patient states she was in bed with a friend when she woke up on the ground.  She is no recollection of the fall.  She has been having dizziness but is currently not dizzy.  She had diarrhea a couple days ago but has no diarrhea now.  EMS Gave her albuterol treatments with improvement of her breathing status.  She has no other concerns noted today.     Review of patient's allergies indicates:   Allergen Reactions    Penicillins Other (See Comments)     Past Medical History:   Diagnosis Date    Asthma     COPD (chronic obstructive pulmonary disease)      No past surgical history on file.  No family history on file.  Social History     Tobacco Use    Smoking status: Every Day    Smokeless tobacco: Never   Substance Use Topics    Alcohol use: Yes     Review of Systems   Constitutional:  Negative for chills and fever.   HENT:  Negative for sore throat.    Respiratory:  Positive for shortness of breath. Negative for cough.    Cardiovascular:  Negative for chest pain.   Gastrointestinal:  Negative for nausea and vomiting.   Genitourinary:  Negative for dysuria, frequency and urgency.   Musculoskeletal:  Negative for back pain, neck pain and neck stiffness.   Skin:  Negative for rash and wound.   Neurological:  Positive for syncope. Negative for weakness.   Hematological:  Does not bruise/bleed easily.    Psychiatric/Behavioral:  Positive for confusion. Negative for agitation and behavioral problems.      Physical Exam     Initial Vitals   BP Pulse Resp Temp SpO2   06/21/23 1130 06/21/23 1130 06/21/23 1130 06/21/23 1149 06/21/23 1130   137/78 85 (!) 21 98 °F (36.7 °C) 98 %      MAP       --                Physical Exam    Nursing note and vitals reviewed.  Constitutional: She appears well-developed and well-nourished. She is not diaphoretic. No distress.   HENT:   Head: Normocephalic and atraumatic.   Mouth/Throat: Oropharynx is clear and moist.   Eyes: EOM are normal. Pupils are equal, round, and reactive to light.   Neck: No tracheal deviation present.   Cardiovascular:  Normal rate, regular rhythm, normal heart sounds and intact distal pulses.           Pulmonary/Chest: No stridor. No respiratory distress. She has wheezes. She has rhonchi.   Abdominal: Abdomen is soft. Bowel sounds are normal. She exhibits no distension and no mass. There is no abdominal tenderness.   Musculoskeletal:         General: No edema. Normal range of motion.      Comments: LUE: silt no wrist, snuff box, elbow or shoulder tenderness  RUE: silt no wrist, snuff box, elbow or shoulder tenderness  LLE: SILT dp/pt pulses palpable no mid foot ankle knee or hip tenderness  RLE: SILT dp/pt pulses palpable no mid foot ankle knee or hip tenderness  No cervical thoracic or lumbar point tenderness to palpation no step offs              Neurological: She is alert and oriented to person, place, and time. No cranial nerve deficit or sensory deficit.   Skin: Skin is warm and dry. Capillary refill takes less than 2 seconds. No rash noted.   Psychiatric: She has a normal mood and affect.       ED Course   Procedures  Labs Reviewed   CBC W/ AUTO DIFFERENTIAL - Abnormal; Notable for the following components:       Result Value     (*)     MCH 36.4 (*)     Gran # (ANC) 9.8 (*)     Immature Grans (Abs) 0.06 (*)     Gran % 80.8 (*)     Lymph % 13.0 (*)      All other components within normal limits   COMPREHENSIVE METABOLIC PANEL - Abnormal; Notable for the following components:    Potassium 3.3 (*)     CO2 19 (*)     Total Bilirubin 1.1 (*)     AST 76 (*)     Anion Gap 17 (*)     All other components within normal limits   TROPONIN I - Abnormal; Notable for the following components:    Troponin I 3.363 (*)     All other components within normal limits   TROPONIN I - Abnormal; Notable for the following components:    Troponin I 2.679 (*)     All other components within normal limits   CK - Abnormal; Notable for the following components:    CPK 3441 (*)     All other components within normal limits   D DIMER, QUANTITATIVE - Abnormal; Notable for the following components:    D-Dimer 3.40 (*)     All other components within normal limits   ISTAT PROCEDURE - Abnormal; Notable for the following components:    POC PCO2 34.5 (*)     POC PO2 75 (*)     POC HCO3 21.1 (*)     POC TCO2 22 (*)     All other components within normal limits        ECG Results              Repeat EKG 12-lead (In process)  Result time 06/21/23 15:29:31      In process by Interface, Lab In [facility] (06/21/23 15:29:31)                   Narrative:    Test Reason : R06.02,    Vent. Rate : 076 BPM     Atrial Rate : 076 BPM     P-R Int : 138 ms          QRS Dur : 146 ms      QT Int : 436 ms       P-R-T Axes : -29 031 225 degrees     QTc Int : 490 ms    Normal sinus rhythm  Left bundle branch block  Abnormal ECG  When compared with ECG of 21-JUN-2023 11:42,  Premature atrial complexes are no longer Present    Referred By: System System           Confirmed By:                                      EKG 12-lead (In process)  Result time 06/21/23 14:46:40      In process by Interface, Lab In [facility] (06/21/23 14:46:40)                   Narrative:    Test Reason : R07.9,    Vent. Rate : 079 BPM     Atrial Rate : 079 BPM     P-R Int : 170 ms          QRS Dur : 146 ms      QT Int : 460 ms       P-R-T Axes : 078  016 231 degrees     QTc Int : 527 ms    Sinus rhythm with Premature atrial complexes  Left bundle branch block  Abnormal ECG  When compared with ECG of 01-OCT-2020 10:43,  Premature atrial complexes are now Present  T wave inversion now evident in Anterior leads    Referred By: System System           Confirmed By:                                   Imaging Results              CT Cervical Spine Without Contrast (Final result)  Result time 06/21/23 15:01:56      Final result by Petey Mckeon III, MD (06/21/23 15:01:56)                   Impression:      No acute process seen.    Chronic change as above.      Electronically signed by: Petey Mckeon MD  Date:    06/21/2023  Time:    15:01               Narrative:    EXAMINATION:  CT CERVICAL SPINE WITHOUT CONTRAST    CLINICAL HISTORY:  Neck trauma (Age >= 65y);    FINDINGS:  Odontoid, prevertebral soft tissues, and posterior elements are intact.  Craniocervical junction is unremarkable.  The facets are well aligned and intact.  No fracture, dislocation, or bone destruction is seen.  Upper lungs are clear.  Posterior fossa structures are unremarkable.  The skull base and temporal bones demonstrate nothing unusual.  There is DJD.  There is left-sided neural foraminal narrowing at C3-C4.  There is bilateral neural foraminal narrowing at C4-C5.  There is bilateral neural foraminal narrowing at C5-C6.  Remaining levels are unremarkable.  No incidental soft tissue masses or adenopathy seen.                                        CT Head Without Contrast (Final result)  Result time 06/21/23 14:44:35      Final result by Petey Mckeon III, MD (06/21/23 14:44:35)                   Impression:      Hyperdense mass right posterior frontal region could represent a hematoma.  This is probably most likely.  A brain neoplasm is thought to be less likely.  For further evaluation MRI of the brain without with contrast is recommended.    This report was flagged in Epic as  abnormal.      Electronically signed by: Petey Mckeon MD  Date:    06/21/2023  Time:    14:44               Narrative:    EXAMINATION:  CT HEAD WITHOUT CONTRAST    CLINICAL HISTORY:  Head trauma, minor (Age >= 65y);    FINDINGS:  There is a hyperdense 4 cm mass posterior right frontal region with surrounding edema.  The remaining brain parenchyma is unremarkable.  No skull lesion or skull fracture seen.  No skull lesion or skull fracture seen.                                       CTA Chest Non-Coronary (PE Studies) (Final result)  Result time 06/21/23 14:50:19      Final result by Petey Mckeon III, MD (06/21/23 14:50:19)                   Impression:      No acute process seen.  No acute aortic syndrome or pulmonary embolus seen.    Fat containing 9 mm right middle lobe lung nodule most likely benign.      Electronically signed by: Petey Mckeon MD  Date:    06/21/2023  Time:    14:50               Narrative:    EXAMINATION:  CTA CHEST NON CORONARY (PE STUDIES)    CLINICAL HISTORY:  Pulmonary embolism (PE) suspected, positive D-dimer;    FINDINGS:  Drive patient was administered 100 cc of Omnipaque 350 intravenously.    The arch and great vessels are unremarkable.  Pulmonary vessels are very well opacified and there is no evidence of pulmonary embolism.  No mediastinal, hilar, or axillary adenopathy is seen.  The upper abdominal organs demonstrate nothing unusual.  The trachea and bronchi are patent.  There is no evidence of interstitial lung disease, emphysema, bronchiectasis, or airway trapping.  There is a small fat containing 9 mm nodule right middle lobe.  Left lung is clear.  Bones reveal DJD.                                       X-Ray Chest AP Portable (Final result)  Result time 06/21/23 12:55:18      Final result by Petey Mckeon III, MD (06/21/23 12:55:18)                   Impression:      No acute process seen.      Electronically signed by: Petey Mckeon  MD  Date:    06/21/2023  Time:    12:55               Narrative:    EXAMINATION:  XR CHEST AP PORTABLE    CLINICAL HISTORY:  Chest Pain;    FINDINGS:  Chest one view: Heart size is normal.  Lungs are clear.  Bones showed DJD.                                       Medications   albuterol sulfate nebulizer solution 10 mg (10 mg Nebulization Given 6/21/23 1232)   iohexoL (OMNIPAQUE 350) injection 100 mL (100 mLs Intravenous Given 6/21/23 1433)   sodium chloride 0.9% bolus 1,000 mL 1,000 mL (0 mLs Intravenous Stopped 6/21/23 1609)     Medical Decision Making:   Differential Diagnosis:   Differential Diagnosis includes, but is not limited to:  Arrhythmia, aortic dissection, MI/unstable angina, PE, cardiogenic shock, CHF, CVA/TIA, intracranial lesion/mass, seizure, perforated viscous, ruptured AAA, orthostatic hypotension, vasovagal episode, anemia, dehydration, medication reaction, intentional overdose            Attending Attestation:         Attending Critical Care:   Critical Care Times:   ==============================================================  Total Critical Care Time - exclusive of procedural time: 35 minutes.  ==============================================================  Critical care was necessary to treat or prevent imminent or life-threatening deterioration of the following conditions: CNS failure and myocardial infarction.         ED Course as of 06/22/23 1021   Wed Jun 21, 2023   1150 ECG: Rate 79 Sinus rhtyjm. LBBB. No STEMI per Sgarbosssa [RN]   1341 Repeat ECG: Rate 76. Normal sinus rhythm. LBBB, negative Sgarbossa Criteria . No significant change  [RN]   1346 CBC auto differential(!)  No significant abnormality.    [RN]   1346 Comprehensive metabolic panel(!)  Mild decrease bicarb, non specific elevation of tbili, hypokalemia  [RN]   1346 CPK(!)  Elevated CPK c/w possible rhabdo  [RN]   1346 Troponin I #1(!)  Elevated negative ECG for STEMI discussing with cardiology  [RN]   1346 D dimer,  quantitative(!)  Elevated - plan CTA to evaluate for PE  [RN]   1347 ISTAT PROCEDURE(!)  No significant abnormality.    [RN]   1349 Discussed with Dr. Quintanilla, reviewed ECG, agreed with management so far, no indication for emergent PCI  [RN]   1547 Patient with likely cerebral  as reviewed by neurosurgery - BP stable, cardene ordered if needed for BP >140, transfer initiated. Patient alert and oriented to person, place, time, protecting airway    [RN]      ED Course User Index  [RN] Thor Santos Jr., MD                   Clinical Impression:   Final diagnoses:  [R07.9] Chest pain  [R06.02] Shortness of breath  [I61.9] Cerebral hemorrhage (Primary)        ED Disposition Condition    Transfer to Another Facility Stable               Portions of this note were dictated using voice recognition software and may contain dictation related errors in spelling/grammar/syntax not found on text review       Thor Santos Jr., MD  06/22/23 1027

## 2023-06-21 NOTE — ED NOTES
Present to ED with SOB x 3 days. Reports SOB has gradually gotten worse. Hx of COPD. + dizziness. Reports falling sometime in the middle of the night of the night where she stayed on the floor until EMS arrive. + diarrhea. Denies chest pain or abd pain. Per EMS , Mary-neb and Solu-Medrol given pta. Speaking in complete sentence. Noted purse lip breathing. Denies sick contact.95-97% O2 saturation on RA. Denies any pain at this time

## 2023-06-21 NOTE — ED NOTES
Pt attempting to get out bed when rounding, pt redirected and informed to press call button if needing assistance. Side rails x2 and bed in lowest position. Call light placed within reach and instructed on use.

## 2023-06-21 NOTE — HPI
"Patient is a 72F with PMH asthma and COPD who presents for evaluation of ICH vs mass. No family or friends present, patient is somewhat of a poor historian. Reports she woke up on the ground Monday, does not remember falling. She states she went to the ED in [place] and was told she had a head bleed and was discharged home. She presented to the ED today with confusion. Reports worsening SOB x 3 days otherwise asymptomatic. She is tremulous. Denies headache, neck pain, numbness, paresthesias, back pain, hand clumsiness, gait instability, seizures, abdominal pain, weight loss. Reports daily cigarette use. Reports mother  of "cancer in the chest." Also endorses chronic diarrhea. Denies use of antiplatelets or anticoagulants.  "

## 2023-06-21 NOTE — Clinical Note
ostium   left main. An angiography was performed of the left coronary arteries. Multiple views were taken.

## 2023-06-21 NOTE — ASSESSMENT & PLAN NOTE
Patient is a 72F with PMH asthma and COPD who presents for evaluation of right frontal hyperdensity with surrounding edema concerning for ICH vs mass after a fall 2 days ago.     --Recommend admission to  for workup of elevated troponin, CPK, metastatic workup  --All labs and diagnostics reviewed  --Q4h neurochecks   --F/u MRI brain w/wo contrast  --CT chest/abdo/pelvis with contrast if MRI brain concerning for mass  --SBP <160  --Na >135  --Keppra 500 BID  --HOB >30  --Follow-up full pre-op labs (CBC/CMP/PT-INR/PTT/T&S)  --Continue to monitor clinically, notify NSGY immediately with any changes in neuro status  --Discussed with Dr. Garnett

## 2023-06-21 NOTE — PHARMACY MED REC
"  Admission Medication History     The home medication history was taken by Karolyn Patel CPhT.    Medication history obtained from, [facility] and Patient Verified    You may go to "Admission" then "Reconcile Home Medications" tabs to review and/or act upon these items.     The home medication list has been updated by the Pharmacy department.   Please read ALL comments highlighted in yellow.   Please address this information as you see fit.    Feel free to contact us if you have any questions or require assistance.      The medications listed below were removed from the home medication list.  Please reorder if appropriate:  Patient reports no longer taking the following medication(s):  Anti-Diarrheal         Karolyn Patel CPhT.  Ext [phone]             .        "

## 2023-06-21 NOTE — Clinical Note
Diagnosis: Elevated troponin [345145]   Admitting Provider:: ABI CELESTE [[id number]]   Future Attending Provider: ABI CELESTE [[id number]]   Reason for IP Medical Treatment  (Clinical interventions that can only be accomplished in the IP setting? ) :: Elevated troponin   I certify that Inpatient services for greater than or equal to 2 midnights are medically necessary:: No   Plans for Post-Acute care--if anticipated (pick the single best option):: A. No post acute care anticipated at this time

## 2023-06-22 PROBLEM — I51.81 TAKOTSUBO CARDIOMYOPATHY: Status: ACTIVE | Noted: 2023-06-22

## 2023-06-22 PROBLEM — R78.81 BACTEREMIA DUE TO GRAM-NEGATIVE BACTERIA: Status: ACTIVE | Noted: 2023-06-22

## 2023-06-22 PROBLEM — F17.200 SMOKER: Chronic | Status: ACTIVE | Noted: 2020-10-15

## 2023-06-22 PROBLEM — J44.9 CHRONIC OBSTRUCTIVE PULMONARY DISEASE: Chronic | Status: ACTIVE | Noted: 2018-07-09

## 2023-06-22 PROBLEM — G93.41 METABOLIC ENCEPHALOPATHY: Status: ACTIVE | Noted: 2023-06-22

## 2023-06-22 PROBLEM — I50.9 ACUTE CHF: Status: ACTIVE | Noted: 2023-06-22

## 2023-06-22 PROBLEM — I62.9 INTRACRANIAL HEMORRHAGE: Status: ACTIVE | Noted: 2023-06-21

## 2023-06-22 PROBLEM — G93.40 ENCEPHALOPATHY: Status: ACTIVE | Noted: 2023-06-22

## 2023-06-22 PROBLEM — I21.4 NSTEMI (NON-ST ELEVATED MYOCARDIAL INFARCTION): Status: ACTIVE | Noted: 2023-06-22

## 2023-06-22 LAB
ACINETOBACTER CALCOACETICUS/BAUMANNII COMPLEX: NOT DETECTED
ALBUMIN SERPL BCP-MCNC: 3.6 G/DL (ref 3.5–5.2)
ALLENS TEST: ABNORMAL
ALP SERPL-CCNC: 49 U/L (ref 55–135)
ALT SERPL W/O P-5'-P-CCNC: 28 U/L (ref 10–44)
ANION GAP SERPL CALC-SCNC: 13 MMOL/L (ref 8–16)
ASCENDING AORTA: 3.3 CM
AST SERPL-CCNC: 87 U/L (ref 10–40)
AV INDEX (PROSTH): 0.64
AV MEAN GRADIENT: 2 MMHG
AV PEAK GRADIENT: 4 MMHG
AV VALVE AREA: 2.83 CM2
AV VELOCITY RATIO: 0.72
BACTERIA #/AREA URNS AUTO: ABNORMAL /HPF
BACTERIA #/AREA URNS AUTO: NORMAL /HPF
BACTEROIDES FRAGILIS: NOT DETECTED
BASOPHILS # BLD AUTO: 0.03 K/UL (ref 0–0.2)
BASOPHILS NFR BLD: 0.2 % (ref 0–1.9)
BILIRUB SERPL-MCNC: 1 MG/DL (ref 0.1–1)
BILIRUB UR QL STRIP: NEGATIVE
BILIRUB UR QL STRIP: NEGATIVE
BSA FOR ECHO PROCEDURE: 1.78 M2
BUN SERPL-MCNC: 17 MG/DL (ref 8–23)
CALCIUM SERPL-MCNC: 8.6 MG/DL (ref 8.7–10.5)
CANDIDA ALBICANS: NOT DETECTED
CANDIDA AURIS: NOT DETECTED
CANDIDA GLABRATA: NOT DETECTED
CANDIDA KRUSEI: NOT DETECTED
CANDIDA PARAPSILOSIS: NOT DETECTED
CANDIDA TROPICALIS: NOT DETECTED
CHLORIDE SERPL-SCNC: 105 MMOL/L (ref 95–110)
CLARITY UR REFRACT.AUTO: CLEAR
CLARITY UR REFRACT.AUTO: CLEAR
CO2 SERPL-SCNC: 25 MMOL/L (ref 23–29)
COLOR UR AUTO: YELLOW
COLOR UR AUTO: YELLOW
CREAT SERPL-MCNC: 0.7 MG/DL (ref 0.5–1.4)
CRYPTOCOCCUS NEOFORMANS/GATTII: NOT DETECTED
CTX-M GENE: NOT DETECTED
CV ECHO LV RWT: 0.34 CM
DELSYS: ABNORMAL
DIFFERENTIAL METHOD: ABNORMAL
DOP CALC AO PEAK VEL: 0.96 M/S
DOP CALC AO VTI: 19.25 CM
DOP CALC LVOT AREA: 4.4 CM2
DOP CALC LVOT DIAMETER: 2.38 CM
DOP CALC LVOT PEAK VEL: 0.69 M/S
DOP CALC LVOT STROKE VOLUME: 54.47 CM3
DOP CALCLVOT PEAK VEL VTI: 12.25 CM
E WAVE DECELERATION TIME: 321.31 MSEC
E/A RATIO: 0.59
E/E' RATIO: 9.43 M/S
ECHO LV POSTERIOR WALL: 0.89 CM (ref 0.6–1.1)
EJECTION FRACTION: 18 %
ENTEROBACTER CLOACAE COMPLEX: NOT DETECTED
ENTEROBACTERALES: ABNORMAL
ENTEROCOCCUS FAECALIS: NOT DETECTED
ENTEROCOCCUS FAECIUM: NOT DETECTED
EOSINOPHIL # BLD AUTO: 0 K/UL (ref 0–0.5)
EOSINOPHIL NFR BLD: 0 % (ref 0–8)
ERYTHROCYTE [DISTWIDTH] IN BLOOD BY AUTOMATED COUNT: 11.7 % (ref 11.5–14.5)
ESCHERICHIA COLI: DETECTED
EST. GFR  (NO RACE VARIABLE): >60 ML/MIN/1.73 M^2
FRACTIONAL SHORTENING: 13 % (ref 28–44)
GLUCOSE SERPL-MCNC: 95 MG/DL (ref 70–110)
GLUCOSE UR QL STRIP: NEGATIVE
GLUCOSE UR QL STRIP: NEGATIVE
HAEMOPHILUS INFLUENZAE: NOT DETECTED
HCO3 UR-SCNC: 26.7 MMOL/L (ref 24–28)
HCO3 UR-SCNC: 26.9 MMOL/L (ref 24–28)
HCT VFR BLD AUTO: 39.9 % (ref 37–48.5)
HGB BLD-MCNC: 13.6 G/DL (ref 12–16)
HGB UR QL STRIP: ABNORMAL
HGB UR QL STRIP: ABNORMAL
HYALINE CASTS UR QL AUTO: 0 /LPF
HYALINE CASTS UR QL AUTO: 0 /LPF
IMM GRANULOCYTES # BLD AUTO: 0.08 K/UL (ref 0–0.04)
IMM GRANULOCYTES NFR BLD AUTO: 0.5 % (ref 0–0.5)
IMP GENE: NOT DETECTED
INTERVENTRICULAR SEPTUM: 1.11 CM (ref 0.6–1.1)
KETONES UR QL STRIP: ABNORMAL
KETONES UR QL STRIP: NEGATIVE
KLEBSIELLA AEROGENES: NOT DETECTED
KLEBSIELLA OXYTOCA: NOT DETECTED
KLEBSIELLA PNEUMONIAE GROUP: NOT DETECTED
KPC: NOT DETECTED
LA MAJOR: 4.55 CM
LA WIDTH: 2.95 CM
LACTATE SERPL-SCNC: 2 MMOL/L (ref 0.5–2.2)
LACTATE SERPL-SCNC: 2.4 MMOL/L (ref 0.5–2.2)
LACTATE SERPL-SCNC: 2.8 MMOL/L (ref 0.5–2.2)
LDH SERPL L TO P-CCNC: 2.64 MMOL/L (ref 0.5–2.2)
LEFT ATRIUM SIZE: 3.44 CM
LEFT ATRIUM VOLUME INDEX MOD: 17.3 ML/M2
LEFT ATRIUM VOLUME MOD: 30.53 CM3
LEFT INTERNAL DIMENSION IN SYSTOLE: 4.55 CM (ref 2.1–4)
LEFT VENTRICLE DIASTOLIC VOLUME INDEX: 74.3 ML/M2
LEFT VENTRICLE DIASTOLIC VOLUME: 130.76 ML
LEFT VENTRICLE MASS INDEX: 111 G/M2
LEFT VENTRICLE SYSTOLIC VOLUME INDEX: 54 ML/M2
LEFT VENTRICLE SYSTOLIC VOLUME: 94.96 ML
LEFT VENTRICULAR INTERNAL DIMENSION IN DIASTOLE: 5.22 CM (ref 3.5–6)
LEFT VENTRICULAR MASS: 195.4 G
LEUKOCYTE ESTERASE UR QL STRIP: ABNORMAL
LEUKOCYTE ESTERASE UR QL STRIP: NEGATIVE
LISTERIA MONOCYTOGENES: NOT DETECTED
LV LATERAL E/E' RATIO: 8.25 M/S
LV SEPTAL E/E' RATIO: 11 M/S
LYMPHOCYTES # BLD AUTO: 0.9 K/UL (ref 1–4.8)
LYMPHOCYTES NFR BLD: 6.2 % (ref 18–48)
MAGNESIUM SERPL-MCNC: 2.1 MG/DL (ref 1.6–2.6)
MCH RBC QN AUTO: 36.9 PG (ref 27–31)
MCHC RBC AUTO-ENTMCNC: 34.1 G/DL (ref 32–36)
MCR-1: NOT DETECTED
MCV RBC AUTO: 108 FL (ref 82–98)
MEC A/C AND MREJ (MRSA): NOT DETECTED
MEC A/C: NOT DETECTED
MICROSCOPIC COMMENT: ABNORMAL
MICROSCOPIC COMMENT: NORMAL
MODE: ABNORMAL
MONOCYTES # BLD AUTO: 1 K/UL (ref 0.3–1)
MONOCYTES NFR BLD: 6.9 % (ref 4–15)
MV PEAK A VEL: 0.56 M/S
MV PEAK E VEL: 0.33 M/S
MV STENOSIS PRESSURE HALF TIME: 93.18 MS
MV VALVE AREA P 1/2 METHOD: 2.36 CM2
NDM GENE: NOT DETECTED
NEISSERIA MENINGITIDIS: NOT DETECTED
NEUTROPHILS # BLD AUTO: 13 K/UL (ref 1.8–7.7)
NEUTROPHILS NFR BLD: 86.2 % (ref 38–73)
NITRITE UR QL STRIP: POSITIVE
NITRITE UR QL STRIP: POSITIVE
NRBC BLD-RTO: 0 /100 WBC
OXA-48-LIKE: NOT DETECTED
PCO2 BLDA: 35.6 MMHG (ref 35–45)
PCO2 BLDA: 39.3 MMHG (ref 35–45)
PH SMN: 7.45 [PH] (ref 7.35–7.45)
PH SMN: 7.48 [PH] (ref 7.35–7.45)
PH UR STRIP: 6 [PH] (ref 5–8)
PH UR STRIP: 6 [PH] (ref 5–8)
PHOSPHATE SERPL-MCNC: 2.6 MG/DL (ref 2.7–4.5)
PLATELET # BLD AUTO: 266 K/UL (ref 150–450)
PMV BLD AUTO: 10.3 FL (ref 9.2–12.9)
PO2 BLDA: 32 MMHG (ref 40–60)
PO2 BLDA: 74 MMHG (ref 80–100)
POC BE: 3 MMOL/L
POC BE: 3 MMOL/L
POC SATURATED O2: 64 % (ref 95–100)
POC SATURATED O2: 96 % (ref 95–100)
POC TCO2: 28 MMOL/L (ref 23–27)
POC TCO2: 28 MMOL/L (ref 24–29)
POCT GLUCOSE: 61 MG/DL (ref 70–110)
POCT GLUCOSE: 75 MG/DL (ref 70–110)
POCT GLUCOSE: 95 MG/DL (ref 70–110)
POTASSIUM SERPL-SCNC: 3.9 MMOL/L (ref 3.5–5.1)
PROT SERPL-MCNC: 7.2 G/DL (ref 6–8.4)
PROT UR QL STRIP: ABNORMAL
PROT UR QL STRIP: ABNORMAL
PROTEUS SPECIES: NOT DETECTED
PSEUDOMONAS AERUGINOSA: NOT DETECTED
RA MAJOR: 4.22 CM
RA PRESSURE: 15 MMHG
RA WIDTH: 3.08 CM
RBC # BLD AUTO: 3.69 M/UL (ref 4–5.4)
RBC #/AREA URNS AUTO: 1 /HPF (ref 0–4)
RBC #/AREA URNS AUTO: 46 /HPF (ref 0–4)
RIGHT VENTRICULAR END-DIASTOLIC DIMENSION: 3.25 CM
SALMONELLA SP: NOT DETECTED
SAMPLE: ABNORMAL
SERRATIA MARCESCENS: NOT DETECTED
SINUS: 3.43 CM
SITE: ABNORMAL
SODIUM SERPL-SCNC: 143 MMOL/L (ref 136–145)
SP GR UR STRIP: >1.03 (ref 1–1.03)
SP GR UR STRIP: >1.03 (ref 1–1.03)
SQUAMOUS #/AREA URNS AUTO: 0 /HPF
STAPHYLOCOCCUS AUREUS: NOT DETECTED
STAPHYLOCOCCUS EPIDERMIDIS: NOT DETECTED
STAPHYLOCOCCUS LUGDUNESIS: NOT DETECTED
STAPHYLOCOCCUS SPECIES: NOT DETECTED
STENOTROPHOMONAS MALTOPHILIA: NOT DETECTED
STJ: 2.82 CM
STREPTOCOCCUS AGALACTIAE: NOT DETECTED
STREPTOCOCCUS PNEUMONIAE: NOT DETECTED
STREPTOCOCCUS PYOGENES: NOT DETECTED
STREPTOCOCCUS SPECIES: NOT DETECTED
TDI LATERAL: 0.04 M/S
TDI SEPTAL: 0.03 M/S
TDI: 0.04 M/S
TRICUSPID ANNULAR PLANE SYSTOLIC EXCURSION: 1.53 CM
TROPONIN I SERPL DL<=0.01 NG/ML-MCNC: 2.84 NG/ML (ref 0–0.03)
TROPONIN I SERPL DL<=0.01 NG/ML-MCNC: 2.97 NG/ML (ref 0–0.03)
TROPONIN I SERPL DL<=0.01 NG/ML-MCNC: 3.46 NG/ML (ref 0–0.03)
URN SPEC COLLECT METH UR: ABNORMAL
URN SPEC COLLECT METH UR: ABNORMAL
VAN A/B: NOT DETECTED
VIM GENE: NOT DETECTED
WBC # BLD AUTO: 15.11 K/UL (ref 3.9–12.7)
WBC #/AREA URNS AUTO: 0 /HPF (ref 0–5)
WBC #/AREA URNS AUTO: 27 /HPF (ref 0–5)
WBC CLUMPS UR QL AUTO: ABNORMAL

## 2023-06-22 PROCEDURE — 84484 ASSAY OF TROPONIN QUANT: CPT | Mod: 91 | Performed by: INTERNAL MEDICINE

## 2023-06-22 PROCEDURE — 63600175 PHARM REV CODE 636 W HCPCS

## 2023-06-22 PROCEDURE — 84100 ASSAY OF PHOSPHORUS: CPT | Performed by: STUDENT IN AN ORGANIZED HEALTH CARE EDUCATION/TRAINING PROGRAM

## 2023-06-22 PROCEDURE — 97165 OT EVAL LOW COMPLEX 30 MIN: CPT

## 2023-06-22 PROCEDURE — 25000003 PHARM REV CODE 250

## 2023-06-22 PROCEDURE — 83605 ASSAY OF LACTIC ACID: CPT

## 2023-06-22 PROCEDURE — 83605 ASSAY OF LACTIC ACID: CPT | Mod: 91 | Performed by: INTERNAL MEDICINE

## 2023-06-22 PROCEDURE — C1769 GUIDE WIRE: HCPCS | Performed by: INTERNAL MEDICINE

## 2023-06-22 PROCEDURE — 63600175 PHARM REV CODE 636 W HCPCS: Performed by: STUDENT IN AN ORGANIZED HEALTH CARE EDUCATION/TRAINING PROGRAM

## 2023-06-22 PROCEDURE — 27000221 HC OXYGEN, UP TO 24 HOURS

## 2023-06-22 PROCEDURE — 36600 WITHDRAWAL OF ARTERIAL BLOOD: CPT

## 2023-06-22 PROCEDURE — 20000000 HC ICU ROOM

## 2023-06-22 PROCEDURE — 93458 L HRT ARTERY/VENTRICLE ANGIO: CPT | Mod: 26,,, | Performed by: INTERNAL MEDICINE

## 2023-06-22 PROCEDURE — 82803 BLOOD GASES ANY COMBINATION: CPT

## 2023-06-22 PROCEDURE — 25000003 PHARM REV CODE 250: Performed by: INTERNAL MEDICINE

## 2023-06-22 PROCEDURE — 99900035 HC TECH TIME PER 15 MIN (STAT)

## 2023-06-22 PROCEDURE — 85025 COMPLETE CBC W/AUTO DIFF WBC: CPT | Performed by: STUDENT IN AN ORGANIZED HEALTH CARE EDUCATION/TRAINING PROGRAM

## 2023-06-22 PROCEDURE — 25000003 PHARM REV CODE 250: Performed by: STUDENT IN AN ORGANIZED HEALTH CARE EDUCATION/TRAINING PROGRAM

## 2023-06-22 PROCEDURE — 84484 ASSAY OF TROPONIN QUANT: CPT | Performed by: INTERNAL MEDICINE

## 2023-06-22 PROCEDURE — 99291 PR CRITICAL CARE, E/M 30-74 MINUTES: ICD-10-PCS | Mod: GC,,, | Performed by: INTERNAL MEDICINE

## 2023-06-22 PROCEDURE — 99152 PR MOD CONSCIOUS SEDATION, SAME PHYS, 5+ YRS, FIRST 15 MIN: ICD-10-PCS | Mod: ,,, | Performed by: INTERNAL MEDICINE

## 2023-06-22 PROCEDURE — C1894 INTRO/SHEATH, NON-LASER: HCPCS | Performed by: INTERNAL MEDICINE

## 2023-06-22 PROCEDURE — 97112 NEUROMUSCULAR REEDUCATION: CPT

## 2023-06-22 PROCEDURE — 87086 URINE CULTURE/COLONY COUNT: CPT

## 2023-06-22 PROCEDURE — 25500020 PHARM REV CODE 255: Performed by: INTERNAL MEDICINE

## 2023-06-22 PROCEDURE — 63600175 PHARM REV CODE 636 W HCPCS: Performed by: INTERNAL MEDICINE

## 2023-06-22 PROCEDURE — 97162 PT EVAL MOD COMPLEX 30 MIN: CPT

## 2023-06-22 PROCEDURE — 97530 THERAPEUTIC ACTIVITIES: CPT

## 2023-06-22 PROCEDURE — 25000242 PHARM REV CODE 250 ALT 637 W/ HCPCS: Performed by: STUDENT IN AN ORGANIZED HEALTH CARE EDUCATION/TRAINING PROGRAM

## 2023-06-22 PROCEDURE — 80053 COMPREHEN METABOLIC PANEL: CPT | Performed by: STUDENT IN AN ORGANIZED HEALTH CARE EDUCATION/TRAINING PROGRAM

## 2023-06-22 PROCEDURE — 83735 ASSAY OF MAGNESIUM: CPT | Performed by: STUDENT IN AN ORGANIZED HEALTH CARE EDUCATION/TRAINING PROGRAM

## 2023-06-22 PROCEDURE — 81001 URINALYSIS AUTO W/SCOPE: CPT

## 2023-06-22 PROCEDURE — 99223 PR INITIAL HOSPITAL CARE,LEVL III: ICD-10-PCS | Mod: GC,,, | Performed by: INTERNAL MEDICINE

## 2023-06-22 PROCEDURE — 93458 PR CATH PLACE/CORON ANGIO, IMG SUPER/INTERP,W LEFT HEART VENTRICULOGRAPHY: ICD-10-PCS | Mod: 26,,, | Performed by: INTERNAL MEDICINE

## 2023-06-22 PROCEDURE — 87040 BLOOD CULTURE FOR BACTERIA: CPT | Mod: 59

## 2023-06-22 PROCEDURE — 99223 PR INITIAL HOSPITAL CARE,LEVL III: ICD-10-PCS | Mod: GC,,, | Performed by: STUDENT IN AN ORGANIZED HEALTH CARE EDUCATION/TRAINING PROGRAM

## 2023-06-22 PROCEDURE — 99152 MOD SED SAME PHYS/QHP 5/>YRS: CPT | Mod: ,,, | Performed by: INTERNAL MEDICINE

## 2023-06-22 PROCEDURE — C1887 CATHETER, GUIDING: HCPCS | Performed by: INTERNAL MEDICINE

## 2023-06-22 PROCEDURE — 99223 1ST HOSP IP/OBS HIGH 75: CPT | Mod: GC,,, | Performed by: STUDENT IN AN ORGANIZED HEALTH CARE EDUCATION/TRAINING PROGRAM

## 2023-06-22 PROCEDURE — 93458 L HRT ARTERY/VENTRICLE ANGIO: CPT | Performed by: INTERNAL MEDICINE

## 2023-06-22 PROCEDURE — 94761 N-INVAS EAR/PLS OXIMETRY MLT: CPT

## 2023-06-22 PROCEDURE — 94640 AIRWAY INHALATION TREATMENT: CPT

## 2023-06-22 PROCEDURE — 99291 CRITICAL CARE FIRST HOUR: CPT | Mod: GC,,, | Performed by: INTERNAL MEDICINE

## 2023-06-22 PROCEDURE — 99223 1ST HOSP IP/OBS HIGH 75: CPT | Mod: GC,,, | Performed by: INTERNAL MEDICINE

## 2023-06-22 RX ORDER — HEPARIN SOD,PORCINE/0.9 % NACL 1000/500ML
INTRAVENOUS SOLUTION INTRAVENOUS
Status: DISCONTINUED | OUTPATIENT
Start: 2023-06-22 | End: 2023-06-22 | Stop reason: HOSPADM

## 2023-06-22 RX ORDER — SODIUM CHLORIDE 9 MG/ML
INJECTION, SOLUTION INTRAVENOUS CONTINUOUS
Status: ACTIVE | OUTPATIENT
Start: 2023-06-22 | End: 2023-06-22

## 2023-06-22 RX ORDER — DEXMEDETOMIDINE HYDROCHLORIDE 4 UG/ML
0-1.4 INJECTION, SOLUTION INTRAVENOUS CONTINUOUS
Status: DISCONTINUED | OUTPATIENT
Start: 2023-06-22 | End: 2023-06-22

## 2023-06-22 RX ORDER — FUROSEMIDE 10 MG/ML
40 INJECTION INTRAMUSCULAR; INTRAVENOUS
Status: DISCONTINUED | OUTPATIENT
Start: 2023-06-22 | End: 2023-06-22

## 2023-06-22 RX ORDER — LIDOCAINE HYDROCHLORIDE 20 MG/ML
INJECTION, SOLUTION INFILTRATION; PERINEURAL
Status: DISCONTINUED | OUTPATIENT
Start: 2023-06-22 | End: 2023-06-22 | Stop reason: HOSPADM

## 2023-06-22 RX ORDER — ATROPINE SULFATE 0.1 MG/ML
INJECTION INTRAVENOUS
Status: DISCONTINUED
Start: 2023-06-22 | End: 2023-06-22 | Stop reason: WASHOUT

## 2023-06-22 RX ORDER — DEXMEDETOMIDINE HYDROCHLORIDE 4 UG/ML
INJECTION, SOLUTION INTRAVENOUS
Status: COMPLETED
Start: 2023-06-22 | End: 2023-06-22

## 2023-06-22 RX ORDER — FUROSEMIDE 10 MG/ML
80 INJECTION INTRAMUSCULAR; INTRAVENOUS ONCE
Status: COMPLETED | OUTPATIENT
Start: 2023-06-22 | End: 2023-06-22

## 2023-06-22 RX ORDER — SODIUM CHLORIDE 0.9 % (FLUSH) 0.9 %
10 SYRINGE (ML) INJECTION
Status: DISCONTINUED | OUTPATIENT
Start: 2023-06-22 | End: 2023-06-28 | Stop reason: HOSPADM

## 2023-06-22 RX ORDER — DIPHENHYDRAMINE HCL 50 MG
50 CAPSULE ORAL ONCE
Status: COMPLETED | OUTPATIENT
Start: 2023-06-22 | End: 2023-06-22

## 2023-06-22 RX ORDER — BUDESONIDE 0.25 MG/2ML
0.25 INHALANT ORAL DAILY
Status: DISCONTINUED | OUTPATIENT
Start: 2023-06-22 | End: 2023-06-23

## 2023-06-22 RX ORDER — PROTAMINE SULFATE 10 MG/ML
40 INJECTION, SOLUTION INTRAVENOUS ONCE
Status: COMPLETED | OUTPATIENT
Start: 2023-06-22 | End: 2023-06-22

## 2023-06-22 RX ORDER — IPRATROPIUM BROMIDE AND ALBUTEROL SULFATE 2.5; .5 MG/3ML; MG/3ML
3 SOLUTION RESPIRATORY (INHALATION) EVERY 6 HOURS PRN
Status: DISCONTINUED | OUTPATIENT
Start: 2023-06-22 | End: 2023-06-23

## 2023-06-22 RX ORDER — PHENYLEPHRINE HCL IN 0.9% NACL 1 MG/10 ML
SYRINGE (ML) INTRAVENOUS
Status: DISCONTINUED
Start: 2023-06-22 | End: 2023-06-22 | Stop reason: WASHOUT

## 2023-06-22 RX ORDER — HEPARIN SODIUM 1000 [USP'U]/ML
INJECTION, SOLUTION INTRAVENOUS; SUBCUTANEOUS
Status: DISCONTINUED | OUTPATIENT
Start: 2023-06-22 | End: 2023-06-22 | Stop reason: HOSPADM

## 2023-06-22 RX ADMIN — MUPIROCIN: 20 OINTMENT TOPICAL at 08:06

## 2023-06-22 RX ADMIN — ACETAMINOPHEN 650 MG: 325 TABLET ORAL at 10:06

## 2023-06-22 RX ADMIN — Medication 16000 MG: at 05:06

## 2023-06-22 RX ADMIN — SODIUM CHLORIDE: 9 INJECTION, SOLUTION INTRAVENOUS at 12:06

## 2023-06-22 RX ADMIN — METOPROLOL SUCCINATE 12.5 MG: 25 TABLET, EXTENDED RELEASE ORAL at 04:06

## 2023-06-22 RX ADMIN — DEXMEDETOMIDINE HYDROCHLORIDE 0.2 MCG/KG/HR: 4 INJECTION, SOLUTION INTRAVENOUS at 04:06

## 2023-06-22 RX ADMIN — BUDESONIDE 0.25 MG: 0.25 SUSPENSION RESPIRATORY (INHALATION) at 06:06

## 2023-06-22 RX ADMIN — PROTAMINE SULFATE 40 MG: 10 INJECTION, SOLUTION INTRAVENOUS at 01:06

## 2023-06-22 RX ADMIN — DIPHENHYDRAMINE HYDROCHLORIDE 50 MG: 50 CAPSULE ORAL at 12:06

## 2023-06-22 RX ADMIN — POTASSIUM PHOSPHATE, MONOBASIC AND POTASSIUM PHOSPHATE, DIBASIC 20 MMOL: 224; 236 INJECTION, SOLUTION, CONCENTRATE INTRAVENOUS at 09:06

## 2023-06-22 RX ADMIN — POTASSIUM CHLORIDE 10 MEQ: 7.46 INJECTION, SOLUTION INTRAVENOUS at 05:06

## 2023-06-22 RX ADMIN — POTASSIUM CHLORIDE 10 MEQ: 7.46 INJECTION, SOLUTION INTRAVENOUS at 02:06

## 2023-06-22 RX ADMIN — DEXMEDETOMIDINE HYDROCHLORIDE 0.4 MCG/KG/HR: 4 INJECTION, SOLUTION INTRAVENOUS at 06:06

## 2023-06-22 RX ADMIN — FUROSEMIDE 80 MG: 10 INJECTION, SOLUTION INTRAMUSCULAR; INTRAVENOUS at 05:06

## 2023-06-22 RX ADMIN — ATORVASTATIN CALCIUM 40 MG: 40 TABLET, FILM COATED ORAL at 09:06

## 2023-06-22 RX ADMIN — SODIUM CHLORIDE: 9 INJECTION, SOLUTION INTRAVENOUS at 01:06

## 2023-06-22 RX ADMIN — MUPIROCIN: 20 OINTMENT TOPICAL at 09:06

## 2023-06-22 RX ADMIN — CEFEPIME 2 G: 2 INJECTION, POWDER, FOR SOLUTION INTRAVENOUS at 11:06

## 2023-06-22 RX ADMIN — CEFEPIME 2 G: 2 INJECTION, POWDER, FOR SOLUTION INTRAVENOUS at 05:06

## 2023-06-22 RX ADMIN — IPRATROPIUM BROMIDE AND ALBUTEROL SULFATE 3 ML: 2.5; .5 SOLUTION RESPIRATORY (INHALATION) at 05:06

## 2023-06-22 RX ADMIN — LEVETIRACETAM 500 MG: 500 TABLET, FILM COATED ORAL at 09:06

## 2023-06-22 RX ADMIN — LEVETIRACETAM 500 MG: 500 TABLET, FILM COATED ORAL at 08:06

## 2023-06-22 RX ADMIN — IPRATROPIUM BROMIDE AND ALBUTEROL SULFATE 3 ML: 2.5; .5 SOLUTION RESPIRATORY (INHALATION) at 10:06

## 2023-06-22 RX ADMIN — POTASSIUM CHLORIDE 10 MEQ: 7.46 INJECTION, SOLUTION INTRAVENOUS at 12:06

## 2023-06-22 RX ADMIN — IPRATROPIUM BROMIDE AND ALBUTEROL SULFATE 3 ML: 2.5; .5 SOLUTION RESPIRATORY (INHALATION) at 04:06

## 2023-06-22 NOTE — ASSESSMENT & PLAN NOTE
73yo F presenting after being found down at home, presumably had unwitnessed fall.   On admission very encephalopathic. Imaging showed intracranial hemorrhage.   Initial lactic acid was also elevated though now resolved. UA clean.     - Encephalopathy likely secondary to brain bleed (ischemic stroke with hemorrhage conversion versus venous embolism)  - Repeat CTH tomorrow morning to ensure stability  - Will consult vascular neurology and ask them to take over as primary  - Hold all anticoagulation and anti-platelets  - Fall precaution  - Aspiration precaution  - EEG ordered and pending  - Continue Keppra

## 2023-06-22 NOTE — PROGRESS NOTES
"[facility] - Cardiac Intensive Care  Neurosurgery  Progress Note    Subjective:     History of Present Illness: Patient is a 72F with PMH asthma and COPD who presents for evaluation of ICH vs mass. No family or friends present, patient is somewhat of a poor historian. Reports she woke up on the ground Monday, does not remember falling. She states she went to the ED in [place] and was told she had a head bleed and was discharged home. She presented to the ED today with confusion. Reports worsening SOB x 3 days otherwise asymptomatic. She is tremulous. Denies headache, neck pain, numbness, paresthesias, back pain, hand clumsiness, gait instability, seizures, abdominal pain, weight loss. Reports daily cigarette use. Reports mother  of "cancer in the chest." Also endorses chronic diarrhea. Denies use of antiplatelets or anticoagulants.      Post-Op Info:  Procedure(s) (LRB):  Angiogram, Coronary, with Left Heart Cath (Left)         Medications Prior to Admission   Medication Sig Dispense Refill Last Dose    albuterol (PROVENTIL/VENTOLIN HFA) 90 mcg/actuation inhaler Inhale 2 puffs into the lungs every 6 (six) hours as needed for Wheezing. Rescue 18 g 3     fluticasone propionate (FLONASE) 50 mcg/actuation nasal spray 1 spray (50 mcg total) by Each Nostril route once daily. 18 g 3     loratadine (CLARITIN) 10 mg tablet TAKE 1 TABLET BY MOUTH ONCE DAILY (Patient taking differently: Take 10 mg by mouth once daily.) 30 tablet 0     naproxen sodium (ANAPROX) 220 MG tablet Take 220 mg by mouth 2 (two) times daily as needed.            Review of patient's allergies indicates:   Allergen Reactions    Penicillins Other (See Comments)       Past Medical History:   Diagnosis Date    Asthma     COPD (chronic obstructive pulmonary disease)      History reviewed. No pertinent surgical history.  Family History    None       Tobacco Use    Smoking status: Every Day    Smokeless tobacco: Never   Substance and Sexual Activity "    Alcohol use: Yes    Drug use: Not on file    Sexual activity: Not on file     Review of Systems   Constitutional:  Negative for chills, fatigue, fever and unexpected weight change.   Eyes:  Negative for photophobia and visual disturbance.   Respiratory:  Positive for shortness of breath. Negative for chest tightness.    Cardiovascular:  Negative for chest pain.   Gastrointestinal:  Positive for diarrhea. Negative for nausea and vomiting.   Musculoskeletal:  Negative for back pain, gait problem and neck pain.   Neurological:  Positive for tremors. Negative for dizziness, seizures, facial asymmetry, speech difficulty, weakness, numbness and headaches.   Psychiatric/Behavioral:  Negative for confusion.        Objective:     Weight: 70.3 kg (155 lb)  Body mass index is 26.61 kg/m².  Vital Signs (Most Recent):  Temp: 98.6 °F (37 °C) (06/22/23 0700)  Pulse: (!) 57 (06/22/23 1100)  Resp: 18 (06/22/23 1100)  BP: 105/60 (06/22/23 1100)  SpO2: 96 % (06/22/23 1100) Vital Signs (24h Range):  Temp:  [98 °F (36.7 °C)-98.7 °F (37.1 °C)] 98.6 °F (37 °C)  Pulse:  [51-91] 57  Resp:  [18-46] 18  SpO2:  [94 %-100 %] 96 %  BP: ()/(51-71) 105/60     Date 06/22/23 0700 - 06/23/23 0659   Shift 5883-6054 9276-1771 5283-3899 24 Hour Total   INTAKE   I.V.(mL/kg) 18.2(0.3)   18.2(0.3)   IV Piggyback 581.4   581.4   Shift Total(mL/kg) 599.7(8.5)   599.7(8.5)   OUTPUT   Urine(mL/kg/hr) 770   770   Shift Total(mL/kg) 770(11)   770(11)   Weight (kg) 70.3 70.3 70.3 70.3                               Physical Exam     Neurosurgery Physical Exam    General: well developed, well nourished, no distress.   Head: normocephalic, atraumatic  Neck: No tracheal deviation.   Neurologic: Alert and oriented. Thought content appropriate.  GCS: Motor: 6/Verbal: 5/Eyes: 4 GCS Total: 15  Mental Status: Awake, Alert, Oriented x 4  Language: No aphasia  Speech: No dysarthria  Cranial nerves: face symmetric, tongue midline, CN II-XII grossly intact.    Eyes: pupils equal, round, reactive to light with accomodation, EOMI.   Pulmonary: normal respirations, no signs of respiratory distress  Abdomen: soft, non-distended, not tender to palpation  Sensory: intact to light touch throughout  Motor Strength: Moves all extremities spontaneously with good tone.  Full strength upper and lower extremities. Tremors with all movements.    Strength  Deltoids Triceps Biceps Wrist Extension Wrist Flexion Hand    Upper: R 5/5 5/5 5/5 5/5 5/5 5/5    L 5/5 5/5 5/5 5/5 5/5 5/5     Iliopsoas Quadriceps Knee  Flexion Tibialis  anterior Gastro- cnemius EHL   Lower: R 5/5 5/5 5/5 5/5 5/5 5/5    L 5/5 5/5 5/5 5/5 5/5 5/5     Pronator Drift: no drift noted  Finger-to-nose: Intact bilaterally  Vascular: No LE edema.   Skin: Skin is warm, dry and intact.    Cervical ROM: full without pain, no TTP      Significant Labs:  Recent Labs   Lab 06/21/23  1239 06/22/23  0400    95    143   K 3.3* 3.9    105   CO2 19* 25   BUN 16 17   CREATININE 0.8 0.7   CALCIUM 8.9 8.6*   MG  --  2.1       Recent Labs   Lab 06/21/23  1239 06/22/23  0523   WBC 12.06 15.11*   HGB 14.9 13.6   HCT 44.6 39.9    266       Recent Labs   Lab 06/21/23  1721   INR 1.0   APTT 22.1       Microbiology Results (last 7 days)       Procedure Component Value Units Date/Time    Blood culture [[id number]] Collected: 06/21/23 2250    Order Status: Completed Specimen: Blood from Peripheral, Forearm, Right Updated: 06/22/23 0715     Blood Culture, Routine No Growth to date    Blood culture [[id number]] Collected: 06/21/23 2251    Order Status: Completed Specimen: Blood from Peripheral, Antecubital, Right Updated: 06/22/23 0715     Blood Culture, Routine No Growth to date          Recent Lab Results  (Last 5 results in the past 24 hours)        06/22/23  1125   06/22/23  0906   06/22/23  0523   06/22/23  0516   06/22/23  0400        Albumin         3.6       Alkaline Phosphatase         49       Allens Test        Pass         ALT         28       Anion Gap         13       Ascending aorta 3.30               Ao peak fam 0.96               Ao VTI 19.25               AST         87  Comment: *Result may be interfered by visible hemolysis       AV valve area 2.83               AV mean gradient 2               AV index (prosthetic) 0.64               AV peak gradient 4               AV Velocity Ratio 0.72               Baso #     0.03           Basophil %     0.2           BILIRUBIN TOTAL         1.0  Comment: For infants and newborns, interpretation of results should be based  on gestational age, weight and in agreement with clinical  observations.    Premature Infant recommended reference ranges:  Up to 24 hours.............<8.0 mg/dL  Up to 48 hours............<12.0 mg/dL  3-5 days..................<15.0 mg/dL  6-29 days.................<15.0 mg/dL         Site       RR         BSA 1.78               BUN         17       Calcium         8.6       Chloride         105       CO2         25       Creatinine         0.7       Left Ventricle Relative Wall Thickness 0.34               [facility]       Room Air         Differential Method     Automated           E/A ratio 0.59               E/E' ratio 9.43               eGFR         >60.0       EF 18               Eos #     0.0           Eosinophil %     0.0           E wave deceleration time 321.31               FS 13               Glucose         95       Gran # (ANC)     13.0           Gran %     86.2           Hematocrit     39.9           Hemoglobin     13.6           Immature Grans (Abs)     0.08  Comment: Mild elevation in immature granulocytes is non specific and   can be seen in a variety of conditions including stress response,   acute inflammation, trauma and pregnancy. Correlation with other   laboratory and clinical findings is essential.             Immature Granulocytes     0.5           IVSd 1.11               LA WIDTH 2.95               Lactate, Sumeet          2.0  Comment: Falsely low lactic acid results can be found in samples   containing >=13.0 mg/dL total bilirubin and/or >=3.5 mg/dL   direct bilirubin.         Left Atrium Major Axis 4.55               LA size 3.44               LA volume 30.53               LA Volume Index (Mod) 17.3               LVOT area 4.4               LV LATERAL E/E' RATIO 8.25               LV SEPTAL E/E' RATIO 11.00               LV EDV .76               LV Diastolic Volume Index 74.30               LVIDd 5.22               LVIDs 4.55               LV mass 195.40               LV Mass Index 111               LVOT diameter 2.38               LVOT peak angel luis 0.69               LVOT stroke volume 54.47               LVOT peak VTI 12.25               LV ESV BP 94.96               LV Systolic Volume Index 54.0               Lymph #     0.9           Lymph %     6.2           Magnesium         2.1       MCH     36.9           MCHC     34.1           MCV     108           Mean e' 0.04               Mode       SPONT         Mono #     1.0           Mono %     6.9           MPV     10.3           MV valve area p 1/2 method 2.36               MV Peak A Angel Luis 0.56               MV Peak E Angel Luis 0.33               MV stenosis pressure 1/2 time 93.18               nRBC     0           Phosphorus         2.6       Platelets     266           POC BE       3         POC HCO3       26.7         POC PCO2       35.6         POC PH       7.483         POC PO2       74         POC SATURATED O2       96         POC TCO2       28         Potassium         3.9       PROTEIN TOTAL         7.2       Posterior Wall 0.89               Right Atrial Pressure (from IVC) 15               RA Major Axis 4.22               RA Width 3.08               RBC     3.69           RDW     11.7           RVDD 3.25               Sample       ARTERIAL         Sinus 3.43               Sodium         143       STJ 2.82               TAPSE 1.53               TDI SEPTAL 0.03                TDI LATERAL 0.04               Troponin I   2.974  Comment: The reference interval for Troponin I represents the 99th percentile   cutoff   for our facility and is consistent with 3rd generation assay   performance.         3.458  Comment: The reference interval for Troponin I represents the 99th percentile   cutoff   for our facility and is consistent with 3rd generation assay   performance.         WBC     15.11                                All pertinent labs from the last 24 hours have been reviewed.    Significant Diagnostics:  I have reviewed all pertinent imaging results/findings within the past 24 hours.    Assessment/Plan:     Brain mass  Patient is a 72F with PMH asthma and COPD who presents for evaluation of right frontal hyperdensity with surrounding edema concerning for ICH vs mass after a fall 2 days ago.     --Admitted CICU  --All labs and diagnostics reviewed  --Q4h neurochecks   --MRI concerning for focal infarct possible venous embolic etiology  -- No surgery indicated. Recommend CTH tomorrow morning 0400 for stability  -- Recommend holding anticoagulation/antiplatelet at this time until stable imaging  --SBP <160  --Na >135  --HOB >30  --Continue to monitor clinically, notify NSGY immediately with any changes in neuro status  --Discussed with Dr. Tamir Jameson MD  Neurosurgery  [facility] - Cardiac Intensive Care

## 2023-06-22 NOTE — HPI
Svetlana Beck is a 72 year old female with COPD, active smoking, positive nuclear stress test in 2020 who was lost to follow-up.    Patient initially presented in the emergency department in [place] after she was found down on the floor at home and is currently encephalopathic.  Patient is independent at baseline and was doing fine until day before yesterday when she went out for some shopping.  Yesterday she remained inside her room and did not come out but her roommate.  She was brought to the emergency department where her troponin was 3 and EKG showed left bundle-branch block with sinus rhythm which was not new.  CT head showed right posterior frontal region mass versus hematoma and patient was subsequently transferred to [facility].  MRI was done here which shows right frontal hemorrhagic infarction. Bedside TTE demonstrated EF 15-20% with apical ballooning. Troponin has been essentially flat, and IC was consulted for diagnostic angiogram. Of note, she was loaded with asa/plavix but heparin has not been started. She is altered and unable to be consented, sister was called for consent.

## 2023-06-22 NOTE — ASSESSMENT & PLAN NOTE
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 Will perform diagnostic LHC only, defer continued use of anti platelet agents to CCU/NSGY. She has been loaded with asa/plavix.    --diagnostic LHC  - Anti-platelet Therapy: ASA / clopidogrel   - Access: Right radial  - Catheters: Deondre  - Creatinine/CrCl: 0.7  - Allergies: No shellfish / Iodine allergy  - Pre-Hydration: NS  - Pre-Op Med: Bendaryl 50mg pO   - All patient's questions were answered.  -The risks, benefits and alternatives of the procedure were explained to the patient's sister (next of kin.)   -The risks of coronary angiography include but are not limited to: bleeding, infection, heart rhythm abnormalities, allergic reactions, kidney injury and potential need for dialysis, stroke and death.   -The risks of moderate sedation include hypotension, respiratory depression, arrhythmias, bronchospasm, and death.   - Informed consent was obtained and the  patient's family is agreeable for her to proceed with the procedure.

## 2023-06-22 NOTE — ASSESSMENT & PLAN NOTE
Type 2 secondary to demand ischemia and Takotsubo. LHC with nonobstructive coronary disease but elevated EDP.  - No antiplatelets due to ICH  - Continue HI statin

## 2023-06-22 NOTE — HPI
Vascular Neurology consulted for ICH evaluation and potential speciality transfer of Svetlana Beck, a 72 y.o. female smoker with history of COPD who presented to [facility]ED after being found down. History obtained via EMR primarily as patient provides limited history of events.     In brief, patient initially presented to [place] ED s/p documented mechanical fall in setting of shortness of breath, diarrhea, dizziness, and intermittent confusion. OSH ED evaluation notable for elevated troponin, EKG with normal sinus rhythm with occasional premature ventricular complexes and left bundle branch block, and NCCTH with concern for mass vs hematoma. Following discussion with Neurosurgery, patient was subsequently transferred to [facility] ED for further evaluation of NCCTH findings. Diagnostics at [facility] ED notable for persistent troponin elevation. After multi-disciplinary discussion, patient was admitted to CCU for ACS concern.

## 2023-06-22 NOTE — CONSULTS
[facility] - Cardiac Intensive Care  Interventional Cardiology  Consult Note    Patient Name: Svetlana Beck  MRN: [id number]  Admission Date: 6/21/2023  Hospital Length of Stay: 1 days  Code Status: Full Code   Attending Provider: Orlando Green MD  Consulting Provider: Petey Richmond MD  Primary Care Physician: Elida Lara DO  Principal Problem:<principal problem not specified>    Patient information was obtained from patient and past medical records.     Inpatient consult to Interventional Cardiology  Consult performed by: Petey Richmond MD  Consult ordered by: Leon Degroot MD        Subjective:     Chief Complaint:  ams      HPI:  Svetlana Beck is a 72 year old female with COPD, active smoking, positive nuclear stress test in 2020 who was lost to follow-up.    Patient initially presented in the emergency department in [place] after she was found down on the floor at home and is currently encephalopathic.  Patient is independent at baseline and was doing fine until day before yesterday when she went out for some shopping.  Yesterday she remained inside her room and did not come out but her roommate.  She was brought to the emergency department where her troponin was 3 and EKG showed left bundle-branch block with sinus rhythm which was not new.  CT head showed right posterior frontal region mass versus hematoma and patient was subsequently transferred to [facility].  MRI was done here which shows right frontal hemorrhagic infarction. Bedside TTE demonstrated EF 15-20% with apical ballooning. Troponin has been essentially flat, and IC was consulted for diagnostic angiogram. Of note, she was loaded with asa/plavix but heparin has not been started. She is altered and unable to be consented, sister was called for consent.       Past Medical History:   Diagnosis Date    Asthma     COPD (chronic obstructive pulmonary disease)        History reviewed. No pertinent surgical  history.    Review of patient's allergies indicates:   Allergen Reactions    Penicillins Other (See Comments)       PTA Medications   Medication Sig    albuterol (PROVENTIL/VENTOLIN HFA) 90 mcg/actuation inhaler Inhale 2 puffs into the lungs every 6 (six) hours as needed for Wheezing. Rescue    fluticasone propionate (FLONASE) 50 mcg/actuation nasal spray 1 spray (50 mcg total) by Each Nostril route once daily.    loratadine (CLARITIN) 10 mg tablet TAKE 1 TABLET BY MOUTH ONCE DAILY (Patient taking differently: Take 10 mg by mouth once daily.)    naproxen sodium (ANAPROX) 220 MG tablet Take 220 mg by mouth 2 (two) times daily as needed.     Family History    None       Tobacco Use    Smoking status: Every Day    Smokeless tobacco: Never   Substance and Sexual Activity    Alcohol use: Yes    Drug use: Not on file    Sexual activity: Not on file     Review of Systems   Unable to perform ROS: Mental status change   Objective:     Vital Signs (Most Recent):  Temp: 98.7 °F (37.1 °C) (06/22/23 0300)  Pulse: 65 (06/22/23 0600)  Resp: (!) 38 (06/22/23 0600)  BP: 119/67 (06/22/23 0600)  SpO2: 98 % (06/22/23 0600) Vital Signs (24h Range):  Temp:  [98 °F (36.7 °C)-98.7 °F (37.1 °C)] 98.7 °F (37.1 °C)  Pulse:  [64-91] 65  Resp:  [18-46] 38  SpO2:  [94 %-100 %] 98 %  BP: ()/(53-78) 119/67     Weight: 70.3 kg (155 lb)  Body mass index is 26.61 kg/m².    SpO2: 98 %         Intake/Output Summary (Last 24 hours) at 6/22/2023 0830  Last data filed at 6/22/2023 0600  Gross per 24 hour   Intake 1000 ml   Output 2600 ml   Net -1600 ml       Lines/Drains/Airways       Drain  Duration                  Urethral Catheter 06/21/23 2245 Straight-tip;Silicone 16 Fr. <1 day              Peripheral Intravenous Line  Duration                  Peripheral IV - Single Lumen 06/21/23 2247 20 G Anterior;Right Forearm <1 day         Peripheral IV - Single Lumen 06/22/23 0200 18 G Left;Posterior Forearm <1 day                     Physical  Exam  Vitals and nursing note reviewed.   Constitutional:       Appearance: Normal appearance.   HENT:      Head: Normocephalic and atraumatic.      Right Ear: External ear normal.      Left Ear: External ear normal.      Nose: Nose normal.      Mouth/Throat:      Mouth: Mucous membranes are moist.   Eyes:      Pupils: Pupils are equal, round, and reactive to light.   Cardiovascular:      Rate and Rhythm: Normal rate and regular rhythm.      Pulses: Normal pulses.   Pulmonary:      Effort: Pulmonary effort is normal.   Abdominal:      General: Abdomen is flat.   Musculoskeletal:         General: Normal range of motion.      Cervical back: Normal range of motion.      Right lower leg: No edema.      Left lower leg: No edema.   Skin:     General: Skin is warm and dry.      Capillary Refill: Capillary refill takes less than 2 seconds.   Neurological:      Mental Status: She is alert. She is disoriented.          Significant Labs: All pertinent lab results from the last 24 hours have been reviewed.    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 Will perform diagnostic LHC only, defer continued use of anti platelet agents to CCU/NSGY. She has been loaded with asa/plavix.    --diagnostic LHC  - Anti-platelet Therapy: ASA / clopidogrel   - Access: Right radial  - Catheters: Deondre  - Creatinine/CrCl: 0.7  - Allergies: No shellfish / Iodine allergy  - Pre-Hydration: NS  - Pre-Op Med: Bendaryl 50mg pO   - All patient's questions were answered.  -The risks, benefits and alternatives of the procedure were explained to the patient's sister (next of kin.)   -The risks of coronary angiography include but are not limited to: bleeding, infection, heart rhythm abnormalities, allergic reactions, kidney injury and potential need for dialysis, stroke and death.   -The risks of moderate sedation include hypotension, respiratory  depression, arrhythmias, bronchospasm, and death.   - Informed consent was obtained and the  patient's family is agreeable for her to proceed with the procedure.          VTE Risk Mitigation (From admission, onward)         Ordered     IP VTE HIGH RISK PATIENT  Once         06/21/23 2207     Place sequential compression device  Until discontinued         06/21/23 2207                Thank you for your consult. I will follow-up with patient. Please contact us if you have any additional questions.    Petey Richmond MD  Interventional Cardiology   [facility] - Cardiac Intensive Care

## 2023-06-22 NOTE — ASSESSMENT & PLAN NOTE
72 year old woman transferred from OSH for imaging evaluation by Neurosurgery due to concern for mass vs hematoma complicated by persistently elevated troponin on arrival requiring admission to CCU for diagnostic left heart cath. Further imaging notable for right frontal infarction with hemorrhagic transformation; likely acute in timeline. NIHSS 4; likely pseudo elevated as patient examined immediately following procedure. No focal deficits noted. Recommend continuing post procedural care in CCU overnight until repeat NCCTH confirms bleed stability; thereafter, can transfer to Vascular Neurology primary service for ongoing care.     Presumed etiology; cardio-embolic vs large vessel atherosclerotic disease.    Recommendations    Antithrombotics for secondary stroke prevention:   - Hold anti-thrombotic's due to hemorrhage     Statins for secondary stroke prevention and hyperlipidemia, if present:   - Statins: Atorvastatin- 40 mg daily    Aggressive risk factor modification:   - Smoking, Diet, Exercise, CAD     Rehab efforts:   - The patient has been evaluated by a stroke team provider and the therapy needs have been fully considered based off the presenting complaints and exam findings. The following therapy evaluations are needed: PT evaluate and treat, OT evaluate and treat, SLP evaluate and treat    Diagnostics ordered/pending:   - CT scan of head without contrast to asses brain parenchyma, CTA Head to assess vasculature , CTA Neck/Arch to assess vasculature, HgbA1C to assess blood glucose levels, Lipid Profile to assess cholesterol levels, TSH to assess thyroid function    VTE prophylaxis:   - None: Reason for No Pharmacological VTE Prophylaxis: Hemorrhage    BP parameters:   - ICH: SBP <160

## 2023-06-22 NOTE — PT/OT/SLP EVAL
Occupational Therapy   Co-Evaluation and Co-Treatment  Co-treatment performed due to patient's multiple deficits requiring two skilled therapists to appropriately and safely assess patient's strength and endurance while facilitating functional tasks in addition to accommodating for patient's activity tolerance.      Name: Svetlana Beck  MRN: [id number]  Admitting Diagnosis: Encephalopathy  Recent Surgery: Procedure(s) (LRB):  Angiogram, Coronary, with Left Heart Cath (Left) Day of Surgery    Recommendations:     Discharge Recommendations: rehabilitation facility  Discharge Equipment Recommendations:  to be determined by next level of care  Barriers to discharge:  Other (Comment) (increased skilled (A) required)    Assessment:     Svetlana Beck is a 72 y.o. female with a medical diagnosis of Encephalopathy.  She presents with the following performance deficits affecting function: weakness, impaired endurance, impaired self care skills, impaired functional mobility, gait instability, impaired balance, impaired cognition, decreased coordination, decreased upper extremity function, decreased lower extremity function, decreased safety awareness, impaired cardiopulmonary response to activity. Pt found upright in bed and lethargic upon arrival. Pt AAOx2 (person and time-able to state year) and able to provide PLOF and home environment. She performed supine<>sit with minimum-moderate(A), STS t/f with minimum (A), and side steps from EOB with minimum (A) x2 people without incident. Pt would continue to benefit from skilled OT services to maximize functional independence with ADLs and functional mobility, reduce caregiver burden, and facilitate safe discharge in the least restrictive environment. OT recommending IPR once medically appropriate for discharge.      Rehab Prognosis: Good; patient would benefit from acute skilled OT services to address these deficits and reach maximum level of function.       Plan:     Patient to  "be seen 4 x/week to address the above listed problems via self-care/home management, therapeutic activities, therapeutic exercises, neuromuscular re-education  Plan of Care Expires: 07/22/23  Plan of Care Reviewed with:      Subjective   "I'm at the grocery store"  Chief Complaint: needing to urinate  Patient/Family Comments/goals: to sleep    Occupational Profile:  Living Environment: Pt lives with a roommate in a [facility] with 6 TANIA and (R) hand rail. W-I-S  Previous level of function: (I) with ADLs and functional mobility  Roles and Routines: friend; pt is retired  Equipment Used at Home: none  Assistance upon Discharge: roommate    Pain/Comfort:  Pain Rating 1: 0/10  Pain Rating Post-Intervention 1: 0/10    Patients cultural, spiritual, Jainism conflicts given the current situation: no    Objective:     Communicated with: RN prior to session.  Patient found HOB elevated with turner catheter, pulse ox (continuous), telemetry, blood pressure cuff, restraints, peripheral IV upon OT entry to room.    General Precautions: Standard, fall, NPO  Orthopedic Precautions: N/A  Braces: N/A  Respiratory Status: Room air    Occupational Performance:    Bed Mobility:    Patient completed Scooting/Bridging with moderate assistance  Patient completed Supine to Sit with minimum assistance  Patient completed Sit to Supine with moderate assistance  Pt tolerated sitting EOB for ~7 minutes with CGA-SBA    Functional Mobility/Transfers:  Patient completed Sit <> Stand Transfer with minimum assistance  with  no assistive device and hand-held assist   Functional Mobility: Pt performed 3-4 side steps to the left from EOB with minimum (A) x2 people and HHA  Cues for upright posture, widen ERNIE, and forward gaze  Noted with posterior lean and narrow ERNIE with pt able to correct with cueing  No LOB or SOB noted    Activities of Daily Living:  Grooming: stand by assistance to wash face sitting EOB  Toileting: dependence to void via " catheter    Cognitive/Visual Perceptual:  Cognitive/Psychosocial Skills:     -       Oriented to: Person and Time   -       Follows Commands/attention:Follows one-step commands  -       Memory: Impaired STM and Poor immediate recall  -       Safety awareness/insight to disability: impaired   -       Mood/Affect/Coping skills/emotional control: Cooperative    Physical Exam:  Postural examination/scapula alignment:    -       Rounded shoulders  -       Forward head  Upper Extremity Range of Motion:     -       Right Upper Extremity: WFL  -       Left Upper Extremity: WFL  Upper Extremity Strength:    -       Right Upper Extremity: 3+/5  -       Left Upper Extremity: 3+/5    AMPAC 6 Click ADL:  AMPAC Total Score: 15    Treatment & Education:  -Education on energy conservation and task modification to maximize safety and (I) during ADLs and mobility  -Education on importance of OOB activity to improve overall activity tolerance and promote recovery  -Pt educated to call for assistance and to transfer with hospital staff only  -Provided education regarding role of OT, POC, & discharge recommendations with pt verbalizing understanding.  Pt had no further questions & when asked whether there were any concerns pt reported none.     Patient left HOB elevated with all lines intact, call button in reach, restraints reapplied at end of session, and RN notified    GOALS:   Multidisciplinary Problems       Occupational Therapy Goals          Problem: Occupational Therapy    Goal Priority Disciplines Outcome Interventions   Occupational Therapy Goal     OT, PT/OT Ongoing, Progressing    Description: Goals to be met by: 7/20/23     Patient will increase functional independence with ADLs by performing:    UE Dressing with Modified Tracy.  LE Dressing with Stand-by Assistance.  Grooming while standing with Stand-by Assistance.  Toileting from toilet/bedside commode with Stand-by Assistance for hygiene and clothing management.    Toilet transfer to toilet/bedside commode with Supervision and LRAD as needed.                         History:     Past Medical History:   Diagnosis Date    Asthma     COPD (chronic obstructive pulmonary disease)        History reviewed. No pertinent surgical history.    Time Tracking:     OT Date of Treatment: 06/22/23  OT Start Time: 1014  OT Stop Time: 1036  OT Total Time (min): 22 min    Billable Minutes:Evaluation 12  Neuromuscular Re-education 10    6/22/2023

## 2023-06-22 NOTE — HPI
This is a 72 years old female with known history of COPD, active smoking, positive nuclear stress test in 2020 with lost to follow-up. Patient initially presented in the emergency department in [place] after she was found down on the floor at home.  She is disoriented at the time of my encounter so history was obtained from her sister at bedside and also her roommate at home over the phone.  Patient is independent at baseline and was doing fine until day before yesterday when she went out for some shopping.  Yesterday she remained inside her room and did not come out per her roommate.  This morning somebody went to check on her and found her on the floor.  She was brought to the emergency department where her troponin was 3 and EKG showed left bundle-branch block with sinus rhythm which was not new.  CT head showed right posterior frontal region mass versus hematoma and patient was subsequently transferred to [facility].  MRI was done here, final report consistent with intracranial hemorrhage.  Patient currently is oriented to self and says she is in a bar.  She denies any chest pain or shortness of breath at this time and appears to be comfortable.  Her blood pressure and heart rate are also within normal limits.  Bedside echocardiogram done by me shows acute drop in EF 15-20% with apical ballooning and CVP 8 mm Hg by echo.  Her lactic acid is 2.7.  For new onset encephalopathy, discussed with Neuro about possibly starting steroids however steroids were not recommended as no vasogenic edema is present. Keppra was loaded. Patient will be moved to cardiac ICU for further evaluation and management.

## 2023-06-22 NOTE — SUBJECTIVE & OBJECTIVE
Interval History: NAEON. Patient admitted for encephalopathy, intracranial hemorrhage, new EF drop and increased troponin. Trop peaked at 3. Patient remains very confused this morning, though is able to answer some questions appropriately. A and O x3,    C this morning with no significant CAD.     Consulting vascular neurology to take over as primary.    Review of Systems   Cardiovascular: Negative.  Negative for chest pain, irregular heartbeat and leg swelling.   Respiratory: Negative.     Gastrointestinal: Negative.    Neurological:  Positive for difficulty with concentration and weakness. Negative for headaches.   Objective:     Vital Signs (Most Recent):  Temp: 98.6 °F (37 °C) (06/22/23 0700)  Pulse: (!) 57 (06/22/23 1100)  Resp: 18 (06/22/23 1100)  BP: 105/60 (06/22/23 1100)  SpO2: 96 % (06/22/23 1100) Vital Signs (24h Range):  Temp:  [98.2 °F (36.8 °C)-98.7 °F (37.1 °C)] 98.6 °F (37 °C)  Pulse:  [51-91] 57  Resp:  [18-46] 18  SpO2:  [95 %-100 %] 96 %  BP: ()/(51-70) 105/60     Weight: 70.3 kg (155 lb)  Body mass index is 26.61 kg/m².     SpO2: 96 %         Intake/Output Summary (Last 24 hours) at 6/22/2023 1316  Last data filed at 6/22/2023 1100  Gross per 24 hour   Intake 1599.66 ml   Output 3370 ml   Net -1770.34 ml       Lines/Drains/Airways       Drain  Duration                  Urethral Catheter 06/21/23 2245 Straight-tip;Silicone 16 Fr. <1 day              Peripheral Intravenous Line  Duration                  Peripheral IV - Single Lumen 06/22/23 0200 18 G Left;Posterior Forearm <1 day         Peripheral IV - Single Lumen 06/22/23 0905 20 G Anterior;Right Forearm <1 day                       Physical Exam  Constitutional:       General: She is not in acute distress.     Appearance: She is normal weight. She is ill-appearing.   HENT:      Mouth/Throat:      Mouth: Mucous membranes are moist.   Eyes:      Pupils: Pupils are equal, round, and reactive to light.   Cardiovascular:      Rate and  Rhythm: Normal rate and regular rhythm.      Pulses: Normal pulses.      Heart sounds: Normal heart sounds.   Pulmonary:      Effort: Pulmonary effort is normal.      Breath sounds: Normal breath sounds.   Abdominal:      General: Abdomen is flat.      Palpations: Abdomen is soft.   Musculoskeletal:      Right lower leg: No edema.      Left lower leg: No edema.   Skin:     General: Skin is warm and dry.   Neurological:      Comments: Oriented to self and time, not to place   Psychiatric:         Mood and Affect: Mood normal.          Significant Labs: All pertinent lab results from the last 24 hours have been reviewed.    Significant Imaging:  Reviewed

## 2023-06-22 NOTE — PLAN OF CARE
[facility] - Cardiac Intensive Care  Initial Discharge Assessment       Primary Care Provider: Elida Lara DO    Admission Diagnosis: Takotsubo cardiomyopathy [I51.81]  Elevated troponin [R77.8]  Chest pain [R07.9]    Admission Date: 6/21/2023  Expected Discharge Date: 6/23/2023    Transition of Care Barriers: None    Payor: [employer] MEDICARE / Plan: [employer] 65 / Product Type: Medicare Advantage /     Extended Emergency Contact Information  Primary Emergency Contact: Verna Beck  Mobile Phone: [phone]  Relation: Sister  Secondary Emergency Contact: Madeline Peterson   [facility]  Home Phone: [phone]  Mobile Phone: [phone]  Relation: Friend    Discharge Plan A: Home with family  Discharge Plan B: Home, Home Health      [id number] - [address] [address]  Phone: [phone] Fax: [phone]      Initial Assessment (most recent)       Adult Discharge Assessment - 06/22/23 1422          Discharge Assessment    Assessment Type Discharge Planning Assessment     Confirmed/corrected address, phone number and insurance Yes     Confirmed Demographics Correct on Facesheet     Source of Information patient;family   patient gave permission to speak with sister for full assessment    Communicated FATOU with patient/caregiver Date not available/Unable to determine     Reason For Admission Encephalopathy     People in Home friend(s)   lives witih friend Ifeoma Yee    [facility]     Do you expect to return to your current living situation? Yes     Do you have help at home or someone to help you manage your care at home? No     Prior to hospitilization cognitive status: Alert/Oriented     Current cognitive status: Alert/Oriented     Equipment Currently Used at Home none   Independent prior to this hospitalization.    Readmission within 30 days? No     Patient currently being  followed by outpatient case management? No     Do you currently have service(s) that help you manage your care at home? No     Do you take prescription medications? Yes     Do you have prescription coverage? Yes     Coverage PHN     Do you have any problems affording any of your prescribed medications? No     Is the patient taking medications as prescribed? yes     Who is going to help you get home at discharge? Amber Beck (sister) [phone]     How do you get to doctors appointments? car, drives self;family or friend will provide     Are you on dialysis? No     Do you take coumadin? No     Discharge Plan A Home with family     Discharge Plan B Home;Home Health     DME Needed Upon Discharge  other (see comments)   TBD    Discharge Plan discussed with: Patient;Sibling     Name(s) and Number(s) Amber Beck (sister) [phone]     Transition of Care Barriers None        Physical Activity    On average, how many days per week do you engage in moderate to strenuous exercise (like a brisk walk)? 0 days     On average, how many minutes do you engage in exercise at this level? 0 min        Financial Resource Strain    How hard is it for you to pay for the very basics like food, housing, medical care, and heating? Not hard at all        Housing Stability    In the last 12 months, was there a time when you were not able to pay the mortgage or rent on time? No     In the last 12 months, how many places have you lived? 1     In the last 12 months, was there a time when you did not have a steady place to sleep or slept in a shelter (including now)? No        Transportation Needs    In the past 12 months, has lack of transportation kept you from medical appointments or from getting medications? No     In the past 12 months, has lack of transportation kept you from meetings, work, or from getting things needed for daily living? No        Food Insecurity    Within the past 12 months, you worried that your food would  run out before you got the money to buy more. Never true     Within the past 12 months, the food you bought just didn't last and you didn't have money to get more. Never true        Stress    Do you feel stress - tense, restless, nervous, or anxious, or unable to sleep at night because your mind is troubled all the time - these days? Not at all        Social Connections    In a typical week, how many times do you talk on the phone with family, friends, or neighbors? More than three times a week     How often do you get together with friends or relatives? More than three times a week     How often do you attend Amish or Denominational services? Never     Do you belong to any clubs or organizations such as Amish groups, unions, fraternal or athletic groups, or school groups? No     How often do you attend meetings of the clubs or organizations you belong to? Never     Are you , , , , never , or living with a partner? Never         Alcohol Use    Q1: How often do you have a drink containing alcohol? 4 or more times a week     Q2: How many drinks containing alcohol do you have on a typical day when you are drinking? 3 or 4     Q3: How often do you have six or more drinks on one occasion? Never        OTHER    Name(s) of People in Home Amber Beck (sister) [phone]                   CM met with patient at the bedside. Discussed the discharge process with her . Discharge booklet left at the bedside and contact numbers on white board in room and in front cover of booklet. Patient asked me to call her sister she was tired , she verified her name ,, insurance and PCP. . Patient stated she lives with a room mate Ifeoma Yee in a single story house with 1 step to enter. Called her sister per her request and sister completed assessment with the CM. Sister also verified patient date of birth and name. Patient was independent and caring for her roommate whom is wheelchair  bound. Patient retired hospice nurse. She is not on coumadin and not on dialysis. Will continue to monitor for discharge needs. Sister stated she will be coming to visit her when she gets off work this evening.     Maria Guadalupe Tineo RN CM   [phone]

## 2023-06-22 NOTE — HOSPITAL COURSE
Patient admitted to CCU given concern for elevated troponin and reduced EF. Given concern for intracranial bleed, anticoagulation was not started. After risk/benefit discussion, aspirin and plavix were given, then stopped in the morning. Patient went for diagnostic angiogram on 6/22 that showed nonobstructive coronary artery disease with elevated LVEDP thus confirming Takotsubo cardiomyopathy. Given patient's persistent encephalopathy in the setting of intracranial hemorrhage is her primary problem, vascular neurology was contacted for transfer of care.

## 2023-06-22 NOTE — SUBJECTIVE & OBJECTIVE
Medications Prior to Admission   Medication Sig Dispense Refill Last Dose    albuterol (PROVENTIL/VENTOLIN HFA) 90 mcg/actuation inhaler Inhale 2 puffs into the lungs every 6 (six) hours as needed for Wheezing. Rescue 18 g 3     fluticasone propionate (FLONASE) 50 mcg/actuation nasal spray 1 spray (50 mcg total) by Each Nostril route once daily. 18 g 3     loratadine (CLARITIN) 10 mg tablet TAKE 1 TABLET BY MOUTH ONCE DAILY (Patient taking differently: Take 10 mg by mouth once daily.) 30 tablet 0     naproxen sodium (ANAPROX) 220 MG tablet Take 220 mg by mouth 2 (two) times daily as needed.            Review of patient's allergies indicates:   Allergen Reactions    Penicillins Other (See Comments)       Past Medical History:   Diagnosis Date    Asthma     COPD (chronic obstructive pulmonary disease)      History reviewed. No pertinent surgical history.  Family History    None       Tobacco Use    Smoking status: Every Day    Smokeless tobacco: Never   Substance and Sexual Activity    Alcohol use: Yes    Drug use: Not on file    Sexual activity: Not on file     Review of Systems   Constitutional:  Negative for chills, fatigue, fever and unexpected weight change.   Eyes:  Negative for photophobia and visual disturbance.   Respiratory:  Positive for shortness of breath. Negative for chest tightness.    Cardiovascular:  Negative for chest pain.   Gastrointestinal:  Positive for diarrhea. Negative for nausea and vomiting.   Musculoskeletal:  Negative for back pain, gait problem and neck pain.   Neurological:  Positive for tremors. Negative for dizziness, seizures, facial asymmetry, speech difficulty, weakness, numbness and headaches.   Psychiatric/Behavioral:  Negative for confusion.        Objective:     Weight: 70.3 kg (155 lb)  Body mass index is 26.61 kg/m².  Vital Signs (Most Recent):  Temp: 98.6 °F (37 °C) (06/22/23 0700)  Pulse: (!) 57 (06/22/23 1100)  Resp: 18 (06/22/23 1100)  BP: 105/60 (06/22/23 1100)  SpO2: 96  % (06/22/23 1100) Vital Signs (24h Range):  Temp:  [98 °F (36.7 °C)-98.7 °F (37.1 °C)] 98.6 °F (37 °C)  Pulse:  [51-91] 57  Resp:  [18-46] 18  SpO2:  [94 %-100 %] 96 %  BP: ()/(51-71) 105/60     Date 06/22/23 0700 - 06/23/23 0659   Shift 5372-9722 9648-2436 8352-4982 24 Hour Total   INTAKE   I.V.(mL/kg) 18.2(0.3)   18.2(0.3)   IV Piggyback 581.4   581.4   Shift Total(mL/kg) 599.7(8.5)   599.7(8.5)   OUTPUT   Urine(mL/kg/hr) 770   770   Shift Total(mL/kg) 770(11)   770(11)   Weight (kg) 70.3 70.3 70.3 70.3                               Physical Exam     Neurosurgery Physical Exam    General: well developed, well nourished, no distress.   Head: normocephalic, atraumatic  Neck: No tracheal deviation.   Neurologic: Alert and oriented. Thought content appropriate.  GCS: Motor: 6/Verbal: 5/Eyes: 4 GCS Total: 15  Mental Status: Awake, Alert, Oriented x 4  Language: No aphasia  Speech: No dysarthria  Cranial nerves: face symmetric, tongue midline, CN II-XII grossly intact.   Eyes: pupils equal, round, reactive to light with accomodation, EOMI.   Pulmonary: normal respirations, no signs of respiratory distress  Abdomen: soft, non-distended, not tender to palpation  Sensory: intact to light touch throughout  Motor Strength: Moves all extremities spontaneously with good tone.  Full strength upper and lower extremities. Tremors with all movements.    Strength  Deltoids Triceps Biceps Wrist Extension Wrist Flexion Hand    Upper: R 5/5 5/5 5/5 5/5 5/5 5/5    L 5/5 5/5 5/5 5/5 5/5 5/5     Iliopsoas Quadriceps Knee  Flexion Tibialis  anterior Gastro- cnemius EHL   Lower: R 5/5 5/5 5/5 5/5 5/5 5/5    L 5/5 5/5 5/5 5/5 5/5 5/5     Pronator Drift: no drift noted  Finger-to-nose: Intact bilaterally  Vascular: No LE edema.   Skin: Skin is warm, dry and intact.    Cervical ROM: full without pain, no TTP      Significant Labs:  Recent Labs   Lab 06/21/23  1239 06/22/23  0400    95    143   K 3.3* 3.9    105    CO2 19* 25   BUN 16 17   CREATININE 0.8 0.7   CALCIUM 8.9 8.6*   MG  --  2.1       Recent Labs   Lab 06/21/23  1239 06/22/23  0523   WBC 12.06 15.11*   HGB 14.9 13.6   HCT 44.6 39.9    266       Recent Labs   Lab 06/21/23  1721   INR 1.0   APTT 22.1       Microbiology Results (last 7 days)       Procedure Component Value Units Date/Time    Blood culture [[id number]] Collected: 06/21/23 2250    Order Status: Completed Specimen: Blood from Peripheral, Forearm, Right Updated: 06/22/23 0715     Blood Culture, Routine No Growth to date    Blood culture [[id number]] Collected: 06/21/23 2251    Order Status: Completed Specimen: Blood from Peripheral, Antecubital, Right Updated: 06/22/23 0715     Blood Culture, Routine No Growth to date          Recent Lab Results  (Last 5 results in the past 24 hours)        06/22/23  1125   06/22/23  0906   06/22/23  0523   06/22/23  0516   06/22/23  0400        Albumin         3.6       Alkaline Phosphatase         49       Allens Test       Pass         ALT         28       Anion Gap         13       Ascending aorta 3.30               Ao peak fam 0.96               Ao VTI 19.25               AST         87  Comment: *Result may be interfered by visible hemolysis       AV valve area 2.83               AV mean gradient 2               AV index (prosthetic) 0.64               AV peak gradient 4               AV Velocity Ratio 0.72               Baso #     0.03           Basophil %     0.2           BILIRUBIN TOTAL         1.0  Comment: For infants and newborns, interpretation of results should be based  on gestational age, weight and in agreement with clinical  observations.    Premature Infant recommended reference ranges:  Up to 24 hours.............<8.0 mg/dL  Up to 48 hours............<12.0 mg/dL  3-5 days..................<15.0 mg/dL  6-29 days.................<15.0 mg/dL         Site       RR         BSA 1.78               BUN         17       Calcium         8.6        Chloride         105       CO2         25       Creatinine         0.7       Left Ventricle Relative Wall Thickness 0.34               DelSys       Room Air         Differential Method     Automated           E/A ratio 0.59               E/E' ratio 9.43               eGFR         >60.0       EF 18               Eos #     0.0           Eosinophil %     0.0           E wave deceleration time 321.31               FS 13               Glucose         95       Gran # (ANC)     13.0           Gran %     86.2           Hematocrit     39.9           Hemoglobin     13.6           Immature Grans (Abs)     0.08  Comment: Mild elevation in immature granulocytes is non specific and   can be seen in a variety of conditions including stress response,   acute inflammation, trauma and pregnancy. Correlation with other   laboratory and clinical findings is essential.             Immature Granulocytes     0.5           IVSd 1.11               LA WIDTH 2.95               Lactate, Sumeet         2.0  Comment: Falsely low lactic acid results can be found in samples   containing >=13.0 mg/dL total bilirubin and/or >=3.5 mg/dL   direct bilirubin.         Left Atrium Major Axis 4.55               LA size 3.44               LA volume 30.53               LA Volume Index (Mod) 17.3               LVOT area 4.4               LV LATERAL E/E' RATIO 8.25               LV SEPTAL E/E' RATIO 11.00               LV EDV .76               LV Diastolic Volume Index 74.30               LVIDd 5.22               LVIDs 4.55               LV mass 195.40               LV Mass Index 111               LVOT diameter 2.38               LVOT peak fam 0.69               LVOT stroke volume 54.47               LVOT peak VTI 12.25               LV ESV BP 94.96               LV Systolic Volume Index 54.0               Lymph #     0.9           Lymph %     6.2           Magnesium         2.1       MCH     36.9           MCHC     34.1           MCV     108           Mean  e' 0.04               Mode       SPONT         Mono #     1.0           Mono %     6.9           MPV     10.3           MV valve area p 1/2 method 2.36               MV Peak A Angel Luis 0.56               MV Peak E Angel Luis 0.33               MV stenosis pressure 1/2 time 93.18               nRBC     0           Phosphorus         2.6       Platelets     266           POC BE       3         POC HCO3       26.7         POC PCO2       35.6         POC PH       7.483         POC PO2       74         POC SATURATED O2       96         POC TCO2       28         Potassium         3.9       PROTEIN TOTAL         7.2       Posterior Wall 0.89               Right Atrial Pressure (from IVC) 15               RA Major Axis 4.22               RA Width 3.08               RBC     3.69           RDW     11.7           RVDD 3.25               Sample       ARTERIAL         Sinus 3.43               Sodium         143       STJ 2.82               TAPSE 1.53               TDI SEPTAL 0.03               TDI LATERAL 0.04               Troponin I   2.974  Comment: The reference interval for Troponin I represents the 99th percentile   cutoff   for our facility and is consistent with 3rd generation assay   performance.         3.458  Comment: The reference interval for Troponin I represents the 99th percentile   cutoff   for our facility and is consistent with 3rd generation assay   performance.         WBC     15.11                                All pertinent labs from the last 24 hours have been reviewed.    Significant Diagnostics:  I have reviewed all pertinent imaging results/findings within the past 24 hours.

## 2023-06-22 NOTE — PROGRESS NOTES
Blood cultures drawn 6/21 positive for Gram Negative Rods in both bottles.   Dr. Alfie Turcios notified of results.

## 2023-06-22 NOTE — PLAN OF CARE
Plan of Care:  PT evaluation completed- see note for details. Goals and POC established.     Discharge Recommendation: Rehab.  Please continue Progressive Mobility Protocol as appropriate.  Pt to be seen 3x/week during acute hospitalization to address listed goals below.     2023    Physical Therapy Treatment Goals:  Multidisciplinary Problems       Physical Therapy Goals          Problem: Physical Therapy    Goal Priority Disciplines Outcome Goal Variances Interventions   Physical Therapy Goal     PT, PT/OT Ongoing, Progressing     Description: Goals to be met by: 23    Patient will increase functional independence with mobility by performin. Supine <>sit with Modified [place]  2. Sit to stand transfer with Modified [place]  3. Gait  x 150 feet with Supervision with or without using LRAD   4. Stand for 5 minutes with Supervision using No Assistive Device  5. Lower extremity exercise program x20 reps per handout, with supervision

## 2023-06-22 NOTE — PROGRESS NOTES
[facility] - Cath Lab  Cardiology  Progress Note    Patient Name: Svetlana Beck  MRN: [id number]  Admission Date: 6/21/2023  Hospital Length of Stay: 1 days  Code Status: Full Code   Attending Physician: Orlando Green MD   Primary Care Physician: Elida Lara DO  Expected Discharge Date: 6/23/2023  Principal Problem:Encephalopathy    Subjective:     Hospital Course:   Patient admitted to CCU given concern for elevated troponin and reduced EF. Given concern for intracranial bleed, anticoagulation was not started. After risk/benefit discussion, aspirin and plavix were given, then stopped in the morning. Patient went for diagnostic angiogram on 6/22 that showed nonobstructive coronary artery disease with elevated LVEDP thus confirming Takotsubo cardiomyopathy. Given patient's persistent encephalopathy in the setting of intracranial hemorrhage is her primary problem, vascular neurology was contacted for transfer of care.       Interval History: NAEON. Patient admitted for encephalopathy, intracranial hemorrhage, new EF drop and increased troponin. Trop peaked at 3. Patient remains very confused this morning, though is able to answer some questions appropriately. A and O x3,    LHC this morning with no significant CAD.     Consulting vascular neurology to take over as primary.    Review of Systems   Cardiovascular: Negative.  Negative for chest pain, irregular heartbeat and leg swelling.   Respiratory: Negative.     Gastrointestinal: Negative.    Neurological:  Positive for difficulty with concentration and weakness. Negative for headaches.   Objective:     Vital Signs (Most Recent):  Temp: 98.6 °F (37 °C) (06/22/23 0700)  Pulse: (!) 57 (06/22/23 1100)  Resp: 18 (06/22/23 1100)  BP: 105/60 (06/22/23 1100)  SpO2: 96 % (06/22/23 1100) Vital Signs (24h Range):  Temp:  [98.2 °F (36.8 °C)-98.7 °F (37.1 °C)] 98.6 °F (37 °C)  Pulse:  [51-91] 57  Resp:  [18-46] 18  SpO2:  [95 %-100 %] 96 %  BP: ()/(51-70) 105/60      Weight: 70.3 kg (155 lb)  Body mass index is 26.61 kg/m².     SpO2: 96 %         Intake/Output Summary (Last 24 hours) at 6/22/2023 1316  Last data filed at 6/22/2023 1100  Gross per 24 hour   Intake 1599.66 ml   Output 3370 ml   Net -1770.34 ml       Lines/Drains/Airways       Drain  Duration                  Urethral Catheter 06/21/23 2245 Straight-tip;Silicone 16 Fr. <1 day              Peripheral Intravenous Line  Duration                  Peripheral IV - Single Lumen 06/22/23 0200 18 G Left;Posterior Forearm <1 day         Peripheral IV - Single Lumen 06/22/23 0905 20 G Anterior;Right Forearm <1 day                       Physical Exam  Constitutional:       General: She is not in acute distress.     Appearance: She is normal weight. She is ill-appearing.   HENT:      Mouth/Throat:      Mouth: Mucous membranes are moist.   Eyes:      Pupils: Pupils are equal, round, and reactive to light.   Cardiovascular:      Rate and Rhythm: Normal rate and regular rhythm.      Pulses: Normal pulses.      Heart sounds: Normal heart sounds.   Pulmonary:      Effort: Pulmonary effort is normal.      Breath sounds: Normal breath sounds.   Abdominal:      General: Abdomen is flat.      Palpations: Abdomen is soft.   Musculoskeletal:      Right lower leg: No edema.      Left lower leg: No edema.   Skin:     General: Skin is warm and dry.   Neurological:      Comments: Oriented to self and time, not to place          Significant Labs: All pertinent lab results from the last 24 hours have been reviewed.    Significant Imaging:  Reviewed    Assessment and Plan:     * Encephalopathy  71yo F presenting after being found down at home, presumably had unwitnessed fall.   On admission very encephalopathic. Imaging showed intracranial hemorrhage.   Initial lactic acid was also elevated though now resolved. UA clean.     - Encephalopathy likely secondary to brain bleed (ischemic stroke with hemorrhage conversion versus venous  embolism)  - Repeat CTH tomorrow morning to ensure stability  - Will consult vascular neurology and ask them to take over as primary  - Hold all anticoagulation and anti-platelets  - Fall precaution  - Aspiration precaution  - EEG ordered and pending  - Continue Keppra    Intracranial hemorrhage  See encephalopathy. Stroke and NSGY following.    NSTEMI (non-ST elevated myocardial infarction)  Type 2 secondary to demand ischemia and Takotsubo. [facility] with nonobstructive coronary disease but elevated EDP.  - No antiplatelets due to ICH  - Continue HI statin    Takotsubo cardiomyopathy  Echo 6/22:   The left ventricle is normal in size with mild eccentric hypertrophy and severely decreased systolic function.   The estimated ejection fraction is 18%.   There are segmental left ventricular wall motion abnormalities.   Grade I left ventricular diastolic dysfunction.   Normal right ventricular size with normal right ventricular systolic function.   Elevated central venous pressure (15 mmHg).   Trivial posterior pericardial effusion. Just at the base.  WMA consistent with typical Takotsubo cardiomyopathy  [facility] with nonobstructive disease    - Supportive care  - Beta blockade when able, resting HR currently low  - Elevated LVEDP on [facility]  - Lasix 40mg IV BID    Smoker   on smoking cessation when less encephalopathic        VTE Risk Mitigation (From admission, onward)         Ordered     heparin (porcine) injection  As needed (PRN)         06/22/23 1249     heparin infusion 1,000 units/500 ml in 0.9% NaCl (on sterile field)  As needed (PRN)         06/22/23 1233     IP VTE HIGH RISK PATIENT  Once         06/21/23 2207     Place sequential compression device  Until discontinued         06/21/23 2207                Francine Seo MD  Cardiology  [facility] - Cath Lab

## 2023-06-22 NOTE — PT/OT/SLP EVAL
Physical Therapy Co-Evaluation with OT and Treatment     Patient Name:  Svetlana Beck  MRN: [id number]    Admit Date: 6/21/2023  Admitting Diagnosis:  Encephalopathy  Length of Stay: 1 days  Recent Surgery: Procedure(s) (LRB):  Angiogram, Coronary, with Left Heart Cath (Left) Day of Surgery    Recommendations:     Discharge Recommendations: Inpatient Rehabilitation Facility   Equipment recommendations:  TBD pending pt progress  Barriers to discharge: Increased level of skilled assistance required and Fall risk     Assessment:     Svetlana Beck is a 72 y.o. female admitted to [facility] on 6/21/2023 with medical diagnosis of Encephalopathy. Pt presents with impaired self care skills, weakness, impaired endurance, impaired functional mobility, gait instability, impaired balance, decreased safety awareness, impaired cognition, impaired cardiopulmonary response to activity. These deficits effect their roles and responsibilities in which they were able to complete prior to admit. Svetlana Beck would benefit from acute PT intervention to improve quality of life, focus on recovery of impairments, provide patient/caregiver education, reduce fall risk, and maximize (I) and safety with functional mobility. Once medically stable, recommending pt discharge to Inpatient Rehabilitation Facility .      Rehab Prognosis: Good    Plan:     During this hospitalization, patient to be seen 4 x/week to address the identified rehab impairments via gait training, therapeutic activities, therapeutic exercises, neuromuscular re-education and progress towards stated goals.     Plan of Care Expires:  07/22/23  Plan of Care reviewed with: patient    This plan of care has been discussed with the patient/caregiver, who was included in its development and is in agreement with the identified goals and treatment plan.     Subjective     Communicated with RN prior to session.  Patient found HOB elevated upon PT entry to room, agreeable to evaluation. No  "family/caregiver present.     Chief Complaint: Fall (Arrives via EMS transfer from [facility] needing neuro consult- pt had a fall at home and CT scan resulted with a SDH )    Patient/Family Comments/goals: "I'm at the grocery store"    Pain/Comfort:  Pain Rating 1: 0/10  Pain Rating Post-Intervention 1: 0/10    Patients cultural, spiritual, [employer]ec[employer]alism conflicts given the current situation: None identified     Patient History: information obtained from pt (will need to confirm due to AMS)      Living Environment: Pt lives with friend in single level home  with 6 TANIA. Bathroom set-up: walk-in shower  Prior Level of Function: independent with mobility and ADLs  DME owned: none  Support Available/Caregiver Assistance:  assist from friend as needed       Objective:     Patient found with: turner catheter, pulse ox (continuous), telemetry, blood pressure cuff, restraints, peripheral IV    Recent Surgery: Procedure(s) (LRB):  Angiogram, Coronary, with Left Heart Cath (Left) Day of Surgery  General Precautions: Standard, fall   Orthopedic Precautions:N/A   Braces: N/A   Oxygen Device: room air      Exams:    Cognition:  Alert, calm, and oriented to person and current year (not oriented to month, place, or situation)  Command following: Follows one-step verbal commands  Communication: clear/fluent    Sensation:   Light touch sensation: Intact BLEs    Edema/Skin Integrity: None noted; Visible skin intact    Postural examination/scapula alignment: Rounded shoulder    Lower Extremity Range of Motion:  Right Lower Extremity: WFL  Left Lower Extremity: WFL    Lower Extremity Strength:  Right Lower Extremity:  grossly WFL, pt with difficulty following some commands for MMT   Left Lower Extremity: grossly WFL, pt with difficulty following some commands for MMT     Functional Mobility:    Bed Mobility:  Supine > Sit with minimum assistance  Sit > Supine with moderate assistance  Scooting/bridging with moderate assistance "     Transfers:   Sit <> Stand Transfer: Minimal Assistance x 1 trials from EOB with  no AD, HHA provided              Gait:  Pt performed trial of 3-4 side steps along bedside to L with min A of 2, bilateral HHA   Pt required min A of 2 persons to maintain balance, perform weight shifting and to provide skilled cueing for upright posture, forward gaze and sequencing of steps     Balance:  Dynamic Sitting: GOOD: Maintains balance through MODERATE excursions of active trunk movement  Standing:  Static: FAIR: Maintains without assist but unable to take challenges   Dynamic: FAIR: Needs CONTACT GUARD during gait    Outcome Measure: AM-PAC 6 CLICK MOBILITY  Total Score:17     Patient/Caregiver Education:     Therapist educated pt/caregiver regarding:   PT POC and goals for therapy   Safety with mobility and fall risk   Instruction on use of call button and importance of calling nursing staff for assistance with mobility   Time provided for therapeutic counseling and discussion of current health disposition. All questions answered to satisfaction, within scope of PT practice     Patient/caregiver able to verbalize understanding and expressed no further questions this visit; will follow-up with pt/caregiver during current admit for additional questions/concerns within scope of practice.     White board updated.     Patient left HOB elevated with all lines intact, call button in reach, and RN notified.    GOALS:   Multidisciplinary Problems       Physical Therapy Goals          Problem: Physical Therapy    Goal Priority Disciplines Outcome Goal Variances Interventions   Physical Therapy Goal     PT, PT/OT Ongoing, Progressing     Description: Goals to be met by: 23    Patient will increase functional independence with mobility by performin. Supine <>sit with Modified [place]  2. Sit to stand transfer with Modified [place]  3. Gait  x 150 feet with Supervision with or without using LRAD   4. Stand for 5  minutes with Supervision using No Assistive Device  5. Lower extremity exercise program x20 reps per handout, with supervision                           History:     Past Medical History:   Diagnosis Date    Asthma     COPD (chronic obstructive pulmonary disease)        History reviewed. No pertinent surgical history.    Time Tracking:     PT Received On: 06/22/23  PT Start Time: 1012     PT Stop Time: 1036  PT Total Time (min): 24 min     Billable Minutes: Evaluation 15 min and Therapeutic Activity 9 min    06/22/2023

## 2023-06-22 NOTE — ASSESSMENT & PLAN NOTE
Patient is a 72F with PMH asthma and COPD who presents for evaluation of right frontal hyperdensity with surrounding edema concerning for ICH vs mass after a fall 2 days ago.     --Admitted CICU  --All labs and diagnostics reviewed  --Q4h neurochecks   --MRI concerning for focal infarct possible venous embolic etiology  -- No surgery indicated. Recommend CTH tomorrow morning 0400 for stability  -- Recommend holding anticoagulation/antiplatelet at this time until stable imaging  --SBP <160  --Na >135  --HOB >30  --Continue to monitor clinically, notify NSGY immediately with any changes in neuro status  --Discussed with Dr. Garnett

## 2023-06-22 NOTE — SUBJECTIVE & OBJECTIVE
Past Medical History:   Diagnosis Date    Asthma     COPD (chronic obstructive pulmonary disease)      History reviewed. No pertinent surgical history.  History reviewed. No pertinent family history.  Social History     Tobacco Use    Smoking status: Every Day    Smokeless tobacco: Never   Substance Use Topics    Alcohol use: Yes     Review of patient's allergies indicates:   Allergen Reactions    Penicillins Other (See Comments)       Medications: I have reviewed the current medication administration record.    Medications Prior to Admission   Medication Sig Dispense Refill Last Dose    albuterol (PROVENTIL/VENTOLIN HFA) 90 mcg/actuation inhaler Inhale 2 puffs into the lungs every 6 (six) hours as needed for Wheezing. Rescue 18 g 3     fluticasone propionate (FLONASE) 50 mcg/actuation nasal spray 1 spray (50 mcg total) by Each Nostril route once daily. 18 g 3     loratadine (CLARITIN) 10 mg tablet TAKE 1 TABLET BY MOUTH ONCE DAILY (Patient taking differently: Take 10 mg by mouth once daily.) 30 tablet 0     naproxen sodium (ANAPROX) 220 MG tablet Take 220 mg by mouth 2 (two) times daily as needed.          Review of Systems   Constitutional:  Negative for fatigue and fever.   Eyes:  Negative for photophobia and visual disturbance.   Respiratory:  Positive for shortness of breath. Negative for chest tightness.    Cardiovascular:  Negative for chest pain and palpitations.   Gastrointestinal:  Positive for diarrhea. Negative for nausea.   Musculoskeletal:  Negative for back pain, gait problem, neck pain and neck stiffness.   Skin:  Negative for color change and rash.   Neurological:  Positive for tremors. Negative for dizziness, seizures, facial asymmetry, speech difficulty, weakness, numbness and headaches.   Psychiatric/Behavioral:  Positive for confusion (mild; able to follow conversation appropriately). Negative for agitation.    Objective:     Vital Signs (Most Recent):  Temp: 98.6 °F (37 °C) (06/22/23  0700)  Pulse: (!) 52 (06/22/23 1400)  Resp: 15 (06/22/23 1400)  BP: (!) 94/52 (06/22/23 1400)  SpO2: 99 % (06/22/23 1400)    Vital Signs Range (Last 24H):  Temp:  [98.2 °F (36.8 °C)-98.7 °F (37.1 °C)]   Pulse:  [51-91]   Resp:  [15-42]   BP: ()/(50-70)   SpO2:  [92 %-100 %]        Physical Exam  Vitals and nursing note reviewed.   Constitutional:       General: She is not in acute distress.     Appearance: Normal appearance.   HENT:      Head: Normocephalic and atraumatic.      Right Ear: External ear normal.      Left Ear: External ear normal.      Nose: Nose normal.      Mouth/Throat:      Mouth: Mucous membranes are moist.      Pharynx: Oropharynx is clear.   Eyes:      Extraocular Movements: Extraocular movements intact.      Conjunctiva/sclera: Conjunctivae normal.      Pupils: Pupils are equal, round, and reactive to light.   Cardiovascular:      Rate and Rhythm: Normal rate and regular rhythm.   Pulmonary:      Effort: Pulmonary effort is normal. No respiratory distress.   Musculoskeletal:         General: No swelling, tenderness or deformity. Normal range of motion.      Cervical back: Normal range of motion.   Skin:     General: Skin is warm and dry.      Capillary Refill: Capillary refill takes less than 2 seconds.   Neurological:      General: No focal deficit present.      Mental Status: She is alert.      Cranial Nerves: No cranial nerve deficit.      Sensory: No sensory deficit.      Motor: No weakness.   Psychiatric:         Mood and Affect: Mood normal.         Behavior: Behavior normal.            Neurological Exam:   LOC: alert  Attention Span: Good   Language: appropriate, however, mild confusion noted post procedure  Articulation: No dysarthria  Orientation: Not oriented to time  Visual Fields: Full  EOM (CN III, IV, VI): Full/intact  Pupils (CN II, III): PERRL  Facial Sensation (CN V): Normal  Facial Movement (CN VII): Symmetric facial expression    Motor: 4 extremities  anti-gravity  Sensation: Intact to light touch throughout      Laboratory:  CMP:   Recent Labs   Lab 06/22/23  0400   CALCIUM 8.6*   ALBUMIN 3.6   PROT 7.2      K 3.9   CO2 25      BUN 17   CREATININE 0.7   ALKPHOS 49*   ALT 28   AST 87*   BILITOT 1.0     CBC:   Recent Labs   Lab 06/22/23  0523   WBC 15.11*   RBC 3.69*   HGB 13.6   HCT 39.9      *   MCH 36.9*   MCHC 34.1     Lipid Panel: No results for input(s): CHOL, LDLCALC, HDL, TRIG in the last 168 hours.   Latest Reference Range & Units Most Recent   Cholesterol 120 - 199 mg/dL 163  8/14/20 09:01   HDL 40 - 75 mg/dL 41  8/14/20 09:01   HDL/Cholesterol Ratio 20.0 - 50.0 % 25.2  8/14/20 09:01   LDL Cholesterol External 63.0 - 159.0 mg/dL 102.6  8/14/20 09:01   Non-HDL Cholesterol mg/dL 122  8/14/20 09:01   Total Cholesterol/HDL Ratio 2.0 - 5.0  4.0  8/14/20 09:01   Triglycerides 30 - 150 mg/dL 97  8/14/20 09:01     Coagulation:   Recent Labs   Lab 06/21/23  1721   INR 1.0   APTT 22.1     Hgb A1C: No results for input(s): HGBA1C in the last 168 hours.  TSH: No results for input(s): TSH in the last 168 hours.    Diagnostic Results:      Brain imaging:  MRI Brain W WO Contrast 06/21/2023    Narrative  EXAMINATION:  MRI BRAIN W WO CONTRAST    CLINICAL HISTORY:  Head trauma, minor (Age >= 65y);.    TECHNIQUE:  Multiplanar multisequence MR imaging of the brain was performed before and after the administration of 8 mL Gadavist  intravenous contrast.    There are some mild motion limitations to the examination.    COMPARISON:  CT head 06/21/2023    FINDINGS:  Intracranial compartment:    Prominence of the ventricles and sulci compatible with mild generalized cerebral volume loss.  No hydrocephalus. No extra-axial blood or fluid collections.    Redemonstration of the moderate sized hemorrhagic infarction involving the right frontal lobe demonstrating diffusion restriction and susceptibility artifact. No underlying enhancement to suggest mass  lesion. Mild surrounding edema and localized mass effect without significant midline shift.    Small remote bilateral cerebellar infarcts.    Normal vascular flow voids are preserved.    Skull/extracranial contents (limited evaluation):    Hyperostosis frontalis interna.  Bone marrow signal intensity is normal.    Impression  Moderate sized right frontal hemorrhagic infarction, similar to prior CT.  No underlying mass lesions appreciated.  Follow up examination as clinically indicated.    Electronically signed by resident: Thierno Young  Date:    06/21/2023  Time:    23:20    Electronically signed by: Norbert Killian MD  Date:    06/21/2023  Time:    23:40      CT Head Without Contrast 06/21/2023    Narrative  EXAMINATION:  CT HEAD WITHOUT CONTRAST    CLINICAL HISTORY:  Head trauma, minor (Age >= 65y);    FINDINGS:  There is a hyperdense 4 cm mass posterior right frontal region with surrounding edema.  The remaining brain parenchyma is unremarkable.  No skull lesion or skull fracture seen.  No skull lesion or skull fracture seen.    Impression  Hyperdense mass right posterior frontal region could represent a hematoma.  This is probably most likely.  A brain neoplasm is thought to be less likely.  For further evaluation MRI of the brain without with contrast is recommended.    This report was flagged in Epic as abnormal.      Electronically signed by: Petey Mckeon MD  Date:    06/21/2023  Time:    14:44    Cardiac Evaluation:   Transthoracic echo (TTE) complete 06/22/2023    Interpretation Summary  · The left ventricle is normal in size with mild eccentric hypertrophy and severely decreased systolic function.  · The estimated ejection fraction is 18%.  · There are segmental left ventricular wall motion abnormalities.  · Grade I left ventricular diastolic dysfunction.  · Normal right ventricular size with normal right ventricular systolic function.  · Elevated central venous pressure (15 mmHg).  · Trivial posterior  pericardial effusion. Just at the base.  · Compared with 10/27/2020, marked worsening has occured.

## 2023-06-22 NOTE — PROGRESS NOTES
Pt unable to be redirected and continues to be restless even on Precedex gtt. Pt removed right arm IV and cardiac monitor. Pt removed Jade catheter securement device and attempted to remove Jade catheter. Bilateral soft restraints applied.

## 2023-06-22 NOTE — CARE UPDATE
After discussing with the vascular neurology service, patient will be transferred to their care tomorrow morning at 7am. Her primary problem at this time is encephalopathy related to intracranial hemorrhage, rather than Takotsubo cardiomyopathy and demand ischemia. Patient will stay on the CCU service until interval imaging is completed tomorrow morning, then transfer to the vascular neurology stepdown unit under the care of Dr Raines.     Francine Seo MD  CCU resident

## 2023-06-22 NOTE — SUBJECTIVE & OBJECTIVE
Past Medical History:   Diagnosis Date    Asthma     COPD (chronic obstructive pulmonary disease)        History reviewed. No pertinent surgical history.    Review of patient's allergies indicates:   Allergen Reactions    Penicillins Other (See Comments)       PTA Medications   Medication Sig    albuterol (PROVENTIL/VENTOLIN HFA) 90 mcg/actuation inhaler Inhale 2 puffs into the lungs every 6 (six) hours as needed for Wheezing. Rescue    fluticasone propionate (FLONASE) 50 mcg/actuation nasal spray 1 spray (50 mcg total) by Each Nostril route once daily.    loratadine (CLARITIN) 10 mg tablet TAKE 1 TABLET BY MOUTH ONCE DAILY (Patient taking differently: Take 10 mg by mouth once daily.)    naproxen sodium (ANAPROX) 220 MG tablet Take 220 mg by mouth 2 (two) times daily as needed.     Family History    None       Tobacco Use    Smoking status: Every Day    Smokeless tobacco: Never   Substance and Sexual Activity    Alcohol use: Yes    Drug use: Not on file    Sexual activity: Not on file     Review of Systems   Unable to perform ROS: Mental status change   Objective:     Vital Signs (Most Recent):  Temp: 98.7 °F (37.1 °C) (06/22/23 0300)  Pulse: 65 (06/22/23 0600)  Resp: (!) 38 (06/22/23 0600)  BP: 119/67 (06/22/23 0600)  SpO2: 98 % (06/22/23 0600) Vital Signs (24h Range):  Temp:  [98 °F (36.7 °C)-98.7 °F (37.1 °C)] 98.7 °F (37.1 °C)  Pulse:  [64-91] 65  Resp:  [18-46] 38  SpO2:  [94 %-100 %] 98 %  BP: ()/(53-78) 119/67     Weight: 70.3 kg (155 lb)  Body mass index is 26.61 kg/m².    SpO2: 98 %         Intake/Output Summary (Last 24 hours) at 6/22/2023 0830  Last data filed at 6/22/2023 0600  Gross per 24 hour   Intake 1000 ml   Output 2600 ml   Net -1600 ml       Lines/Drains/Airways       Drain  Duration                  Urethral Catheter 06/21/23 2245 Straight-tip;Silicone 16 Fr. <1 day              Peripheral Intravenous Line  Duration                  Peripheral IV - Single Lumen 06/21/23 2247 20 G  Anterior;Right Forearm <1 day         Peripheral IV - Single Lumen 06/22/23 0200 18 G Left;Posterior Forearm <1 day                     Physical Exam  Vitals and nursing note reviewed.   Constitutional:       Appearance: Normal appearance.   HENT:      Head: Normocephalic and atraumatic.      Right Ear: External ear normal.      Left Ear: External ear normal.      Nose: Nose normal.      Mouth/Throat:      Mouth: Mucous membranes are moist.   Eyes:      Pupils: Pupils are equal, round, and reactive to light.   Cardiovascular:      Rate and Rhythm: Normal rate and regular rhythm.      Pulses: Normal pulses.   Pulmonary:      Effort: Pulmonary effort is normal.   Abdominal:      General: Abdomen is flat.   Musculoskeletal:         General: Normal range of motion.      Cervical back: Normal range of motion.      Right lower leg: No edema.      Left lower leg: No edema.   Skin:     General: Skin is warm and dry.      Capillary Refill: Capillary refill takes less than 2 seconds.   Neurological:      Mental Status: She is alert. She is disoriented.          Significant Labs: All pertinent lab results from the last 24 hours have been reviewed.    Significant Imaging:  reviewed

## 2023-06-22 NOTE — PLAN OF CARE
Problem: Occupational Therapy  Goal: Occupational Therapy Goal  Description: Goals to be met by: 7/20/23     Patient will increase functional independence with ADLs by performing:    UE Dressing with Modified [place].  LE Dressing with Stand-by Assistance.  Grooming while standing with Stand-by Assistance.  Toileting from toilet/bedside commode with Stand-by Assistance for hygiene and clothing management.   Toilet transfer to toilet/bedside commode with Supervision and LRAD as needed.    Outcome: Ongoing, Progressing   OT eval complete. OT POC and goals established.

## 2023-06-22 NOTE — CONSULTS
[facility] - Cardiac Intensive Care  Vascular Neurology  [facility]  Consult Note    Inpatient consult to Vascular (Stroke) Neurology  Consult performed by: Dashawn Hurst MD  Consult ordered by: Francine Seo MD        Assessment/Plan:     Patient is a 72 y.o. year old female with:    Intracranial hemorrhage  72 year old woman transferred from OSH for imaging evaluation by Neurosurgery due to concern for mass vs hematoma complicated by persistently elevated troponin on arrival requiring admission to CCU for diagnostic left heart cath. Further imaging notable for right frontal infarction with hemorrhagic transformation; likely acute in timeline. NIHSS 4; likely pseudo elevated as patient examined immediately following procedure. No focal deficits noted. Recommend continuing post procedural care in CCU overnight until repeat NCCTH confirms bleed stability; thereafter, can transfer to Vascular Neurology primary service for ongoing care. Discussed with attending.    Presumed etiology; cardio-embolic vs large vessel atherosclerotic disease.    Recommendations    Antithrombotics for secondary stroke prevention:   - Hold anti-thrombotic's due to hemorrhage     Statins for secondary stroke prevention and hyperlipidemia, if present:   - Statins: Atorvastatin- 40 mg daily    Aggressive risk factor modification:   - Smoking, Diet, Exercise, CAD     Rehab efforts:   - The patient has been evaluated by a stroke team provider and the therapy needs have been fully considered based off the presenting complaints and exam findings. The following therapy evaluations are needed: PT evaluate and treat, OT evaluate and treat, SLP evaluate and treat    Diagnostics ordered/pending:   - CT scan of head without contrast to asses brain parenchyma, CTA Head to assess vasculature , CTA Neck/Arch to assess vasculature, HgbA1C to assess blood glucose levels, Lipid Profile to assess cholesterol levels, TSH to assess thyroid  function    VTE prophylaxis:   - None: Reason for No Pharmacological VTE Prophylaxis: Hemorrhage    BP parameters:   - ICH: SBP <160          STROKE DOCUMENTATION     Acute Stroke Times   Unknown Normal Date: Unknown Date  Unknown Normal Time: Unknown Time  Unknown Symptom Onset Date: Unknown Date  Unknown Symptom Onset Time: Unknown Time  Thrombolytic Therapy Recommended: No  Thrombectomy Recommended: No    NIH Scale:  1a. Level of Consciousness: 1-->Not alert, but arousable by minor stimulation to obey, answer, or respond  1b. LOC Questions: 2-->Answers neither question correctly  1c. LOC Commands: 0-->Performs both tasks correctly  2. Best Gaze: 0-->Normal  3. Visual: 0-->No visual loss  4. Facial Palsy: 0-->Normal symmetrical movements  5a. Motor Arm, Left: 0-->No drift, limb holds 90 (or 45) degrees for full 10 secs  5b. Motor Arm, Right: 0-->No drift, limb holds 90 (or 45) degrees for full 10 secs  6a. Motor Leg, Left: 0-->No drift, leg holds 30 degree position for full 5 secs  6b. Motor Leg, Right: 0-->No drift, leg holds 30 degree position for full 5 secs  7. Limb Ataxia: 0-->Absent  8. Sensory: 0-->Normal, no sensory loss  9. Best Language: 1-->Mild-to-moderate aphasia, some obvious loss of fluency or facility of comprehension, without significant limitation on ideas expressed or form of expression. Reduction of speech and/or comprehension, however, makes conversation. . . (see row details)  10. Dysarthria: 0-->Normal  11. Extinction and Inattention (formerly Neglect): 0-->No abnormality  Total (NIH Stroke Scale): 4    Modified [place] Coma Scale:14   ABCD2 Score:    [id number] Score:   HAS -BLED Score:   ICH Score:   Hunt & Mckeon Classification:       Thrombolysis Candidate? Not Applicable    Delays to Thrombolysis?  Not Applicable    Interventional Revascularization Candidate?   Is the patient eligible for mechanical endovascular reperfusion (EUNICE)?  Not Applicable    Delays to Thrombectomy? Not  Applicable    Hemorrhagic change of an Ischemic Stroke: Does this patient have an ischemic stroke with hemorrhagic changes? No     Subjective:     History of Present Illness:  Vascular Neurology consulted for ICH evaluation and potential speciality transfer of Svetlana Beck, a 72 y.o. female smoker with history of COPD who presented to [facility] after being found down. History obtained via EMR primarily as patient provides limited history of events.     In brief, patient initially presented to [facility] ED s/p documented mechanical fall in setting of shortness of breath, diarrhea, dizziness, and intermittent confusion. OSH ED evaluation notable for elevated troponin, EKG with normal sinus rhythm with occasional premature ventricular complexes and left bundle branch block, and NCCTH with concern for mass vs hematoma. Following discussion with Neurosurgery, patient was subsequently transferred to [facility] ED for further evaluation of NCCTH findings. Diagnostics at [facility] ED notable for persistent troponin elevation. After multi-disciplinary discussion, patient was admitted to CCU for ACS concern.          Past Medical History:   Diagnosis Date    Asthma     COPD (chronic obstructive pulmonary disease)      History reviewed. No pertinent surgical history.  History reviewed. No pertinent family history.  Social History     Tobacco Use    Smoking status: Every Day    Smokeless tobacco: Never   Substance Use Topics    Alcohol use: Yes     Review of patient's allergies indicates:   Allergen Reactions    Penicillins Other (See Comments)       Medications: I have reviewed the current medication administration record.    Medications Prior to Admission   Medication Sig Dispense Refill Last Dose    albuterol (PROVENTIL/VENTOLIN HFA) 90 mcg/actuation inhaler Inhale 2 puffs into the lungs every 6 (six) hours as needed for Wheezing. Rescue 18 g 3     fluticasone propionate (FLONASE) 50 mcg/actuation nasal spray 1 spray (50 mcg  total) by Each Nostril route once daily. 18 g 3     loratadine (CLARITIN) 10 mg tablet TAKE 1 TABLET BY MOUTH ONCE DAILY (Patient taking differently: Take 10 mg by mouth once daily.) 30 tablet 0     naproxen sodium (ANAPROX) 220 MG tablet Take 220 mg by mouth 2 (two) times daily as needed.          Review of Systems   Constitutional:  Negative for fatigue and fever.   Eyes:  Negative for photophobia and visual disturbance.   Respiratory:  Positive for shortness of breath. Negative for chest tightness.    Cardiovascular:  Negative for chest pain and palpitations.   Gastrointestinal:  Positive for diarrhea. Negative for nausea.   Musculoskeletal:  Negative for back pain, gait problem, neck pain and neck stiffness.   Skin:  Negative for color change and rash.   Neurological:  Positive for tremors. Negative for dizziness, seizures, facial asymmetry, speech difficulty, weakness, numbness and headaches.   Psychiatric/Behavioral:  Positive for confusion (mild; able to follow conversation appropriately). Negative for agitation.    Objective:     Vital Signs (Most Recent):  Temp: 98.6 °F (37 °C) (06/22/23 0700)  Pulse: (!) 52 (06/22/23 1400)  Resp: 15 (06/22/23 1400)  BP: (!) 94/52 (06/22/23 1400)  SpO2: 99 % (06/22/23 1400)    Vital Signs Range (Last 24H):  Temp:  [98.2 °F (36.8 °C)-98.7 °F (37.1 °C)]   Pulse:  [51-91]   Resp:  [15-42]   BP: ()/(50-70)   SpO2:  [92 %-100 %]        Physical Exam  Vitals and nursing note reviewed.   Constitutional:       General: She is not in acute distress.     Appearance: Normal appearance.   HENT:      Head: Normocephalic and atraumatic.      Right Ear: External ear normal.      Left Ear: External ear normal.      Nose: Nose normal.      Mouth/Throat:      Mouth: Mucous membranes are moist.      Pharynx: Oropharynx is clear.   Eyes:      Extraocular Movements: Extraocular movements intact.      Conjunctiva/sclera: Conjunctivae normal.      Pupils: Pupils are equal, round, and  reactive to light.   Cardiovascular:      Rate and Rhythm: Normal rate and regular rhythm.   Pulmonary:      Effort: Pulmonary effort is normal. No respiratory distress.   Musculoskeletal:         General: No swelling, tenderness or deformity. Normal range of motion.      Cervical back: Normal range of motion.   Skin:     General: Skin is warm and dry.      Capillary Refill: Capillary refill takes less than 2 seconds.   Neurological:      General: No focal deficit present.      Mental Status: She is alert.      Cranial Nerves: No cranial nerve deficit.      Sensory: No sensory deficit.      Motor: No weakness.   Psychiatric:         Mood and Affect: Mood normal.         Behavior: Behavior normal.            Neurological Exam:   LOC: alert  Attention Span: Good   Language: appropriate, however, mild confusion noted post procedure  Articulation: No dysarthria  Orientation: Not oriented to time  Visual Fields: Full  EOM (CN III, IV, VI): Full/intact  Pupils (CN II, III): PERRL  Facial Sensation (CN V): Normal  Facial Movement (CN VII): Symmetric facial expression    Motor: 4 extremities anti-gravity  Sensation: Intact to light touch throughout      Laboratory:  CMP:   Recent Labs   Lab 06/22/23  0400   CALCIUM 8.6*   ALBUMIN 3.6   PROT 7.2      K 3.9   CO2 25      BUN 17   CREATININE 0.7   ALKPHOS 49*   ALT 28   AST 87*   BILITOT 1.0     CBC:   Recent Labs   Lab 06/22/23  0523   WBC 15.11*   RBC 3.69*   HGB 13.6   HCT 39.9      *   MCH 36.9*   MCHC 34.1     Lipid Panel: No results for input(s): CHOL, LDLCALC, HDL, TRIG in the last 168 hours.   Latest Reference Range & Units Most Recent   Cholesterol 120 - 199 mg/dL 163  8/14/20 09:01   HDL 40 - 75 mg/dL 41  8/14/20 09:01   HDL/Cholesterol Ratio 20.0 - 50.0 % 25.2  8/14/20 09:01   LDL Cholesterol External 63.0 - 159.0 mg/dL 102.6  8/14/20 09:01   Non-HDL Cholesterol mg/dL 122  8/14/20 09:01   Total Cholesterol/HDL Ratio 2.0 - 5.0  4.0  8/14/20  09:01   Triglycerides 30 - 150 mg/dL 97  8/14/20 09:01     Coagulation:   Recent Labs   Lab 06/21/23  1721   INR 1.0   APTT 22.1     Hgb A1C: No results for input(s): HGBA1C in the last 168 hours.  TSH: No results for input(s): TSH in the last 168 hours.    Diagnostic Results:      Brain imaging:  MRI Brain W WO Contrast 06/21/2023    Narrative  EXAMINATION:  MRI BRAIN W WO CONTRAST    CLINICAL HISTORY:  Head trauma, minor (Age >= 65y);.    TECHNIQUE:  Multiplanar multisequence MR imaging of the brain was performed before and after the administration of 8 mL Gadavist  intravenous contrast.    There are some mild motion limitations to the examination.    COMPARISON:  CT head 06/21/2023    FINDINGS:  Intracranial compartment:    Prominence of the ventricles and sulci compatible with mild generalized cerebral volume loss.  No hydrocephalus. No extra-axial blood or fluid collections.    Redemonstration of the moderate sized hemorrhagic infarction involving the right frontal lobe demonstrating diffusion restriction and susceptibility artifact. No underlying enhancement to suggest mass lesion. Mild surrounding edema and localized mass effect without significant midline shift.    Small remote bilateral cerebellar infarcts.    Normal vascular flow voids are preserved.    Skull/extracranial contents (limited evaluation):    Hyperostosis frontalis interna.  Bone marrow signal intensity is normal.    Impression  Moderate sized right frontal hemorrhagic infarction, similar to prior CT.  No underlying mass lesions appreciated.  Follow up examination as clinically indicated.    Electronically signed by resident: Thierno Young  Date:    06/21/2023  Time:    23:20    Electronically signed by: Norbert Killian MD  Date:    06/21/2023  Time:    23:40      CT Head Without Contrast 06/21/2023    Narrative  EXAMINATION:  CT HEAD WITHOUT CONTRAST    CLINICAL HISTORY:  Head trauma, minor (Age >= 65y);    FINDINGS:  There is a hyperdense 4  cm mass posterior right frontal region with surrounding edema.  The remaining brain parenchyma is unremarkable.  No skull lesion or skull fracture seen.  No skull lesion or skull fracture seen.    Impression  Hyperdense mass right posterior frontal region could represent a hematoma.  This is probably most likely.  A brain neoplasm is thought to be less likely.  For further evaluation MRI of the brain without with contrast is recommended.    This report was flagged in Epic as abnormal.      Electronically signed by: Petey Mckeon MD  Date:    06/21/2023  Time:    14:44    Cardiac Evaluation:   Transthoracic echo (TTE) complete 06/22/2023    Interpretation Summary  · The left ventricle is normal in size with mild eccentric hypertrophy and severely decreased systolic function.  · The estimated ejection fraction is 18%.  · There are segmental left ventricular wall motion abnormalities.  · Grade I left ventricular diastolic dysfunction.  · Normal right ventricular size with normal right ventricular systolic function.  · Elevated central venous pressure (15 mmHg).  · Trivial posterior pericardial effusion. Just at the base.  · Compared with 10/27/2020, marked worsening has occured.          Dashawn Hurst MD  Comprehensive Stroke Center  Department of Vascular Neurology   [facility] - Cardiac Intensive Care

## 2023-06-22 NOTE — NURSING
End of Shift Note:    Received patient at 0140 this AM.    AAO x4 w/ intermittent confusion noted on arrival. However, patient became very agitated and unable to redirect around 0430 this AM. MD notified and received new orders.    Precedex started and infusing at 0.4 mcg/kg/hr.      On RA.  PRN breathing tx started and given x1 per MD order.    NSR. HR 60s-70s.  SBPs: 90s-120s.    NPO.  UOP per turner= 1000mL.    No BM.    MAEW.    Bath given w/ full linen change.    Report given to oncoming nurse.    Temp:  [98.2 °F (36.8 °C)-98.7 °F (37.1 °C)]   Pulse:  [64-75]   Resp:  [19-42]   BP: ()/(54-70)   SpO2:  [95 %-100 %]

## 2023-06-22 NOTE — H&P
[facility] - [facility] Dept  Cardiology  History and Physical     Patient Name: Svetlana Beck  MRN: [id number]  Admission Date: 6/21/2023  Code Status: Full Code   Attending Provider: Orlando Green MD   Primary Care Physician: Elida Lara DO  Principal Problem:<principal problem not specified>    Patient information was obtained from relative(s) and ER records.     Subjective:     Chief Complaint:  Trop elevation     HPI:  This is a 72 years old female with known history of COPD, active smoking, positive nuclear stress test in 2020 with lost to follow-up.    Patient initially presented in the emergency department in [place] after she was found down on the floor at home.  She is disoriented at the time of my encounter so history was obtained from her sister at bedside and also her roommate at home over the phone.  Patient is independent at baseline and was doing fine until day before yesterday when she went out for some shopping.  Yesterday she remained inside her room and did not come out but her roommate.  This morning somebody went to check on her and found her on the floor.  She was brought to the emergency department where her troponin was 3 and EKG showed left bundle-branch block with sinus rhythm which was not new.  CT head showed right posterior frontal region mass versus hematoma and patient was subsequently transferred to [facility].  MRI was done here, final report is pending however per discussion with emergency doctor physician who talked to neuro, impression is most likely this is neoplasm without any vasogenic edema.  Patient currently is oriented to self and says she is in a bar.  She denies any chest pain or shortness of breath at this time and appears to be comfortable.  Her blood pressure and heart rate are also within normal limits.  Bedside echocardiogram done by me shows acute drop in EF 15-20% with apical ballooning and CVP 8 mm Hg by echo.  Her lactic acid is 2.7.  For  new onset encephalopathy, discussed with Neuro about possibly starting steroids however steroids were not recommended as no vasogenic edema is present.  Patient will be moved to cardiac ICU for further evaluation and management.      Past Medical History:   Diagnosis Date    Asthma     COPD (chronic obstructive pulmonary disease)        History reviewed. No pertinent surgical history.    Review of patient's allergies indicates:   Allergen Reactions    Penicillins Other (See Comments)       Current Facility-Administered Medications on File Prior to Encounter   Medication    [COMPLETED] albuterol sulfate nebulizer solution 10 mg    [COMPLETED] iohexoL (OMNIPAQUE 350) injection 100 mL    [COMPLETED] sodium chloride 0.9% bolus 1,000 mL 1,000 mL    [DISCONTINUED] niCARdipine 40 mg/200 mL (0.2 mg/mL) infusion     Current Outpatient Medications on File Prior to Encounter   Medication Sig    albuterol (PROVENTIL/VENTOLIN HFA) 90 mcg/actuation inhaler Inhale 2 puffs into the lungs every 6 (six) hours as needed for Wheezing. Rescue    fluticasone propionate (FLONASE) 50 mcg/actuation nasal spray 1 spray (50 mcg total) by Each Nostril route once daily.    loratadine (CLARITIN) 10 mg tablet TAKE 1 TABLET BY MOUTH ONCE DAILY (Patient taking differently: Take 10 mg by mouth once daily.)    naproxen sodium (ANAPROX) 220 MG tablet Take 220 mg by mouth 2 (two) times daily as needed.    [DISCONTINUED] bismuth subsalicylate (ANTI-DIARRHEAL ORAL) Take by mouth.    [DISCONTINUED] fluticasone (FLONASE) 50 mcg/actuation nasal spray 1 spray (50 mcg total) by Each Nare route once daily.     Family History    None       Tobacco Use    Smoking status: Every Day    Smokeless tobacco: Never   Substance and Sexual Activity    Alcohol use: Yes    Drug use: Not on file    Sexual activity: Not on file     Review of Systems   Unable to perform ROS: Mental status change   Objective:     Vital Signs (Most Recent):  Temp: 98.3 °F  (36.8 °C) (06/21/23 2200)  Pulse: 75 (06/21/23 2300)  Resp: (!) 26 (06/21/23 2300)  BP: 122/70 (06/21/23 2300)  SpO2: 95 % (06/21/23 2300) Vital Signs (24h Range):  Temp:  [98 °F (36.7 °C)-98.7 °F (37.1 °C)] 98.3 °F (36.8 °C)  Pulse:  [67-91] 75  Resp:  [18-46] 26  SpO2:  [94 %-99 %] 95 %  BP: ()/(53-78) 122/70     Weight: 70.3 kg (155 lb)  Body mass index is 26.61 kg/m².    SpO2: 95 %         Intake/Output Summary (Last 24 hours) at 6/21/2023 2330  Last data filed at 6/21/2023 2246  Gross per 24 hour   Intake 1000 ml   Output 400 ml   Net 600 ml       Lines/Drains/Airways       Drain  Duration                  Urethral Catheter 06/21/23 2245 Straight-tip;Silicone 16 Fr. <1 day              Peripheral Intravenous Line  Duration                  Peripheral IV - Single Lumen 06/21/23 20 G Left;Posterior Forearm <1 day         Peripheral IV - Single Lumen 06/21/23 2247 20 G Anterior;Right Forearm <1 day                     Physical Exam  Constitutional:       General: She is not in acute distress.     Appearance: She is normal weight. She is ill-appearing.   HENT:      Mouth/Throat:      Mouth: Mucous membranes are moist.   Eyes:      Pupils: Pupils are equal, round, and reactive to light.   Cardiovascular:      Rate and Rhythm: Normal rate and regular rhythm.   Pulmonary:      Effort: Pulmonary effort is normal.   Abdominal:      General: Abdomen is flat.      Palpations: Abdomen is soft.   Neurological:      Comments: Oriented to self only  Says she is in a bar   Psychiatric:         Mood and Affect: Mood normal.      Significant Labs: Blood Culture: No results for input(s): LABBLOO in the last 48 hours., BMP:   Recent Labs   Lab 06/21/23  1239         K 3.3*      CO2 19*   BUN 16   CREATININE 0.8   CALCIUM 8.9   , CMP   Recent Labs   Lab 06/21/23  1239      K 3.3*      CO2 19*      BUN 16   CREATININE 0.8   CALCIUM 8.9   PROT 7.6   ALBUMIN 3.9   BILITOT 1.1*   ALKPHOS 55    AST 76*   ALT 23   ANIONGAP 17*   , CBC   Recent Labs   Lab 06/21/23  1239   WBC 12.06   HGB 14.9   HCT 44.6      , and INR   Recent Labs   Lab 06/21/23  1721   INR 1.0         Assessment and Plan:     #Acute encephalopathy  #Brain Mass  -at baseline she is AOx3 and independent with all activities  -found on the floor this morning  -CT in the ER with about 4 cm R sided brain mass  -MRI done afterwards, final report pending  -neuro on board, discussed about steroids however didn't recommend steroids since vasogenic edema not seen  -UA ordered   -keppra for seizure prophylaxis    #New onset cardiomyopathy  #ACS  -Trop elevated to 3  -EKG with no new changes, baseline LBBB seen  -she had +ve nuclear stress test in 2020 however lost to f/up and [facility]  -Bedside echo here with EF 15-20% with apical ballooning  -ER physician talked to neuro team and laoded with ASA and Plavix after checking with them  -to check with neuro if ok with heparin gtt  -cons to interventional cardio for [facility]  -formal echo in am    #Acute HFrEF  -new EF drop  -, CVP 8 on echo  -IVP lasix 80x1 ordered, follow UO and order more as needed  -turner inserted for close I/O    #Lactic acidosis  -2.7 in the ER, trend    VTE Risk Mitigation (From admission, onward)         Ordered     IP VTE HIGH RISK PATIENT  Once        SCDs  06/21/23 2207     Place sequential compression device  Until discontinued         06/21/23 2207                Leon Degroot MD  Cardiology   [facility] - Emergency Dept

## 2023-06-22 NOTE — PLAN OF CARE
Pt admitted to CCU for further management of ADHF, NSTEMI, and ICH.    Given concern for ACS, DAPT with ASA and Plavix was initiated. Spoke with neurosurgery regarding initiation of heparin in setting of findings of head imaging.   Recommendation was to assess risk vs benefit knowing there is a possibility of worsening ICH if heparin is initiated. If initiated to repeat CTH when ptt in therapeutic range.  Also NSGY was inquired about initiation of decadron which they did not recommend as there's no concern for vasogenic edema per their evaluation of images.    Spoke with CCU staff.  Will hold off on heparin for time being.  Will order EEG  Cont keppra  Will repeat lactate and monitor closely. If cont to rise will place central access for hemodynamic monitoring.  Full H&P to follow by Dr. Guerrero.    Becky Martinez MD  Cardiovascular medicine; PGY4  [facility]  [address] [place] [address]

## 2023-06-22 NOTE — ASSESSMENT & PLAN NOTE
Echo 6/22:   The left ventricle is normal in size with mild eccentric hypertrophy and severely decreased systolic function.   The estimated ejection fraction is 18%.   There are segmental left ventricular wall motion abnormalities.   Grade I left ventricular diastolic dysfunction.   Normal right ventricular size with normal right ventricular systolic function.   Elevated central venous pressure (15 mmHg).   Trivial posterior pericardial effusion. Just at the base.  WMA consistent with typical Takotsubo cardiomyopathy  [facility] with nonobstructive disease    - Supportive care  - Beta blockade when able, resting HR currently low  - Elevated LVEDP on [facility]  - Lasix 40mg IV BID

## 2023-06-22 NOTE — SUBJECTIVE & OBJECTIVE
Past Medical History:   Diagnosis Date    Asthma     COPD (chronic obstructive pulmonary disease)        History reviewed. No pertinent surgical history.    Review of patient's allergies indicates:   Allergen Reactions    Penicillins Other (See Comments)       Current Facility-Administered Medications on File Prior to Encounter   Medication    [COMPLETED] albuterol sulfate nebulizer solution 10 mg    [COMPLETED] iohexoL (OMNIPAQUE 350) injection 100 mL    [COMPLETED] sodium chloride 0.9% bolus 1,000 mL 1,000 mL    [DISCONTINUED] niCARdipine 40 mg/200 mL (0.2 mg/mL) infusion     Current Outpatient Medications on File Prior to Encounter   Medication Sig    albuterol (PROVENTIL/VENTOLIN HFA) 90 mcg/actuation inhaler Inhale 2 puffs into the lungs every 6 (six) hours as needed for Wheezing. Rescue    fluticasone propionate (FLONASE) 50 mcg/actuation nasal spray 1 spray (50 mcg total) by Each Nostril route once daily.    loratadine (CLARITIN) 10 mg tablet TAKE 1 TABLET BY MOUTH ONCE DAILY (Patient taking differently: Take 10 mg by mouth once daily.)    naproxen sodium (ANAPROX) 220 MG tablet Take 220 mg by mouth 2 (two) times daily as needed.    [DISCONTINUED] bismuth subsalicylate (ANTI-DIARRHEAL ORAL) Take by mouth.    [DISCONTINUED] fluticasone (FLONASE) 50 mcg/actuation nasal spray 1 spray (50 mcg total) by Each Nare route once daily.     Family History    None       Tobacco Use    Smoking status: Every Day    Smokeless tobacco: Never   Substance and Sexual Activity    Alcohol use: Yes    Drug use: Not on file    Sexual activity: Not on file     Review of Systems   Unable to perform ROS: Mental status change   Objective:     Vital Signs (Most Recent):  Temp: 98.3 °F (36.8 °C) (06/21/23 2200)  Pulse: 75 (06/21/23 2300)  Resp: (!) 26 (06/21/23 2300)  BP: 122/70 (06/21/23 2300)  SpO2: 95 % (06/21/23 2300) Vital Signs (24h Range):  Temp:  [98 °F (36.7 °C)-98.7 °F (37.1 °C)] 98.3 °F (36.8 °C)  Pulse:  [67-91] 75  Resp:   [18-46] 26  SpO2:  [94 %-99 %] 95 %  BP: ()/(53-78) 122/70     Weight: 70.3 kg (155 lb)  Body mass index is 26.61 kg/m².    SpO2: 95 %         Intake/Output Summary (Last 24 hours) at 6/21/2023 2330  Last data filed at 6/21/2023 2246  Gross per 24 hour   Intake 1000 ml   Output 400 ml   Net 600 ml       Lines/Drains/Airways       Drain  Duration                  Urethral Catheter 06/21/23 2245 Straight-tip;Silicone 16 Fr. <1 day              Peripheral Intravenous Line  Duration                  Peripheral IV - Single Lumen 06/21/23 20 G Left;Posterior Forearm <1 day         Peripheral IV - Single Lumen 06/21/23 2247 20 G Anterior;Right Forearm <1 day                     Physical Exam  Constitutional:       General: She is not in acute distress.     Appearance: She is normal weight. She is ill-appearing.   HENT:      Mouth/Throat:      Mouth: Mucous membranes are moist.   Eyes:      Pupils: Pupils are equal, round, and reactive to light.   Cardiovascular:      Rate and Rhythm: Normal rate and regular rhythm.   Pulmonary:      Effort: Pulmonary effort is normal.   Abdominal:      General: Abdomen is flat.      Palpations: Abdomen is soft.   Neurological:      Comments: Oriented to self only  Says she is in a bar   Psychiatric:         Mood and Affect: Mood normal.      Significant Labs: Blood Culture: No results for input(s): LABBLOO in the last 48 hours., BMP:   Recent Labs   Lab 06/21/23  1239         K 3.3*      CO2 19*   BUN 16   CREATININE 0.8   CALCIUM 8.9   , CMP   Recent Labs   Lab 06/21/23  1239      K 3.3*      CO2 19*      BUN 16   CREATININE 0.8   CALCIUM 8.9   PROT 7.6   ALBUMIN 3.9   BILITOT 1.1*   ALKPHOS 55   AST 76*   ALT 23   ANIONGAP 17*   , CBC   Recent Labs   Lab 06/21/23  1239   WBC 12.06   HGB 14.9   HCT 44.6      , and INR   Recent Labs   Lab 06/21/23  1721   INR 1.0

## 2023-06-23 PROBLEM — I63.411 EMBOLIC STROKE INVOLVING RIGHT MIDDLE CEREBRAL ARTERY: Status: ACTIVE | Noted: 2023-06-21

## 2023-06-23 LAB
ALBUMIN SERPL BCP-MCNC: 3 G/DL (ref 3.5–5.2)
ALP SERPL-CCNC: 40 U/L (ref 55–135)
ALT SERPL W/O P-5'-P-CCNC: 26 U/L (ref 10–44)
ANION GAP SERPL CALC-SCNC: 10 MMOL/L (ref 8–16)
AST SERPL-CCNC: 64 U/L (ref 10–40)
BACTERIA UR CULT: NO GROWTH
BASOPHILS # BLD AUTO: 0.02 K/UL (ref 0–0.2)
BASOPHILS NFR BLD: 0.2 % (ref 0–1.9)
BILIRUB SERPL-MCNC: 0.5 MG/DL (ref 0.1–1)
BUN SERPL-MCNC: 19 MG/DL (ref 8–23)
CALCIUM SERPL-MCNC: 7.5 MG/DL (ref 8.7–10.5)
CHLORIDE SERPL-SCNC: 102 MMOL/L (ref 95–110)
CHOLEST SERPL-MCNC: 128 MG/DL (ref 120–199)
CHOLEST/HDLC SERPL: 3.4 {RATIO} (ref 2–5)
CO2 SERPL-SCNC: 26 MMOL/L (ref 23–29)
CREAT SERPL-MCNC: 0.7 MG/DL (ref 0.5–1.4)
DIFFERENTIAL METHOD: ABNORMAL
EOSINOPHIL # BLD AUTO: 0 K/UL (ref 0–0.5)
EOSINOPHIL NFR BLD: 0 % (ref 0–8)
ERYTHROCYTE [DISTWIDTH] IN BLOOD BY AUTOMATED COUNT: 11.9 % (ref 11.5–14.5)
EST. GFR  (NO RACE VARIABLE): >60 ML/MIN/1.73 M^2
ESTIMATED AVG GLUCOSE: 85 MG/DL (ref 68–131)
FOLATE SERPL-MCNC: 9.8 NG/ML (ref 4–24)
GLUCOSE SERPL-MCNC: 79 MG/DL (ref 70–110)
HBA1C MFR BLD: 4.6 % (ref 4–5.6)
HCT VFR BLD AUTO: 37.9 % (ref 37–48.5)
HDLC SERPL-MCNC: 38 MG/DL (ref 40–75)
HDLC SERPL: 29.7 % (ref 20–50)
HGB BLD-MCNC: 13 G/DL (ref 12–16)
IMM GRANULOCYTES # BLD AUTO: 0.06 K/UL (ref 0–0.04)
IMM GRANULOCYTES NFR BLD AUTO: 0.5 % (ref 0–0.5)
LDLC SERPL CALC-MCNC: 65.6 MG/DL (ref 63–159)
LYMPHOCYTES # BLD AUTO: 1.8 K/UL (ref 1–4.8)
LYMPHOCYTES NFR BLD: 13.7 % (ref 18–48)
MAGNESIUM SERPL-MCNC: 2 MG/DL (ref 1.6–2.6)
MCH RBC QN AUTO: 37.1 PG (ref 27–31)
MCHC RBC AUTO-ENTMCNC: 34.3 G/DL (ref 32–36)
MCV RBC AUTO: 108 FL (ref 82–98)
MONOCYTES # BLD AUTO: 1 K/UL (ref 0.3–1)
MONOCYTES NFR BLD: 7.7 % (ref 4–15)
NEUTROPHILS # BLD AUTO: 10.2 K/UL (ref 1.8–7.7)
NEUTROPHILS NFR BLD: 77.9 % (ref 38–73)
NONHDLC SERPL-MCNC: 90 MG/DL
NRBC BLD-RTO: 0 /100 WBC
PHOSPHATE SERPL-MCNC: 2.3 MG/DL (ref 2.7–4.5)
PLATELET # BLD AUTO: 223 K/UL (ref 150–450)
PMV BLD AUTO: 10.7 FL (ref 9.2–12.9)
POCT GLUCOSE: 115 MG/DL (ref 70–110)
POCT GLUCOSE: 125 MG/DL (ref 70–110)
POCT GLUCOSE: 69 MG/DL (ref 70–110)
POCT GLUCOSE: 86 MG/DL (ref 70–110)
POTASSIUM SERPL-SCNC: 3.5 MMOL/L (ref 3.5–5.1)
PROT SERPL-MCNC: 6.4 G/DL (ref 6–8.4)
RBC # BLD AUTO: 3.5 M/UL (ref 4–5.4)
SODIUM SERPL-SCNC: 138 MMOL/L (ref 136–145)
TRIGL SERPL-MCNC: 122 MG/DL (ref 30–150)
VIT B12 SERPL-MCNC: 270 PG/ML (ref 210–950)
WBC # BLD AUTO: 13.06 K/UL (ref 3.9–12.7)

## 2023-06-23 PROCEDURE — 25000003 PHARM REV CODE 250

## 2023-06-23 PROCEDURE — 82746 ASSAY OF FOLIC ACID SERUM: CPT

## 2023-06-23 PROCEDURE — 80053 COMPREHEN METABOLIC PANEL: CPT

## 2023-06-23 PROCEDURE — 93010 EKG 12-LEAD: ICD-10-PCS | Mod: ,,, | Performed by: INTERNAL MEDICINE

## 2023-06-23 PROCEDURE — 93005 ELECTROCARDIOGRAM TRACING: CPT

## 2023-06-23 PROCEDURE — 25000242 PHARM REV CODE 250 ALT 637 W/ HCPCS: Performed by: PHYSICIAN ASSISTANT

## 2023-06-23 PROCEDURE — 99233 SBSQ HOSP IP/OBS HIGH 50: CPT | Mod: ,,, | Performed by: PSYCHIATRY & NEUROLOGY

## 2023-06-23 PROCEDURE — 94761 N-INVAS EAR/PLS OXIMETRY MLT: CPT

## 2023-06-23 PROCEDURE — 25000242 PHARM REV CODE 250 ALT 637 W/ HCPCS: Performed by: STUDENT IN AN ORGANIZED HEALTH CARE EDUCATION/TRAINING PROGRAM

## 2023-06-23 PROCEDURE — 63600175 PHARM REV CODE 636 W HCPCS: Performed by: PHYSICIAN ASSISTANT

## 2023-06-23 PROCEDURE — 80061 LIPID PANEL: CPT

## 2023-06-23 PROCEDURE — 83735 ASSAY OF MAGNESIUM: CPT

## 2023-06-23 PROCEDURE — 97530 THERAPEUTIC ACTIVITIES: CPT

## 2023-06-23 PROCEDURE — 82607 VITAMIN B-12: CPT

## 2023-06-23 PROCEDURE — 25000003 PHARM REV CODE 250: Performed by: PHYSICIAN ASSISTANT

## 2023-06-23 PROCEDURE — 83036 HEMOGLOBIN GLYCOSYLATED A1C: CPT

## 2023-06-23 PROCEDURE — 95816 PR EEG,W/AWAKE & DROWSY RECORD: ICD-10-PCS | Mod: 26,,, | Performed by: PSYCHIATRY & NEUROLOGY

## 2023-06-23 PROCEDURE — 99900031 HC PATIENT EDUCATION (STAT)

## 2023-06-23 PROCEDURE — 11000001 HC ACUTE MED/SURG PRIVATE ROOM

## 2023-06-23 PROCEDURE — 93010 ELECTROCARDIOGRAM REPORT: CPT | Mod: ,,, | Performed by: INTERNAL MEDICINE

## 2023-06-23 PROCEDURE — 99233 PR SUBSEQUENT HOSPITAL CARE,LEVL III: ICD-10-PCS | Mod: ,,, | Performed by: PSYCHIATRY & NEUROLOGY

## 2023-06-23 PROCEDURE — 25500020 PHARM REV CODE 255: Performed by: INTERNAL MEDICINE

## 2023-06-23 PROCEDURE — 97110 THERAPEUTIC EXERCISES: CPT

## 2023-06-23 PROCEDURE — 25000003 PHARM REV CODE 250: Performed by: INTERNAL MEDICINE

## 2023-06-23 PROCEDURE — 85025 COMPLETE CBC W/AUTO DIFF WBC: CPT

## 2023-06-23 PROCEDURE — 97535 SELF CARE MNGMENT TRAINING: CPT

## 2023-06-23 PROCEDURE — 84100 ASSAY OF PHOSPHORUS: CPT

## 2023-06-23 PROCEDURE — 63600175 PHARM REV CODE 636 W HCPCS

## 2023-06-23 PROCEDURE — 97116 GAIT TRAINING THERAPY: CPT

## 2023-06-23 PROCEDURE — 94640 AIRWAY INHALATION TREATMENT: CPT

## 2023-06-23 PROCEDURE — 95816 EEG AWAKE AND DROWSY: CPT | Mod: 26,,, | Performed by: PSYCHIATRY & NEUROLOGY

## 2023-06-23 PROCEDURE — 99900035 HC TECH TIME PER 15 MIN (STAT)

## 2023-06-23 PROCEDURE — 36415 COLL VENOUS BLD VENIPUNCTURE: CPT

## 2023-06-23 PROCEDURE — 95816 EEG AWAKE AND DROWSY: CPT

## 2023-06-23 RX ORDER — TALC
6 POWDER (GRAM) TOPICAL NIGHTLY
Status: DISCONTINUED | OUTPATIENT
Start: 2023-06-23 | End: 2023-06-28 | Stop reason: HOSPADM

## 2023-06-23 RX ORDER — IPRATROPIUM BROMIDE AND ALBUTEROL SULFATE 2.5; .5 MG/3ML; MG/3ML
3 SOLUTION RESPIRATORY (INHALATION)
Status: COMPLETED | OUTPATIENT
Start: 2023-06-23 | End: 2023-06-24

## 2023-06-23 RX ORDER — IBUPROFEN 200 MG
1 TABLET ORAL DAILY PRN
Status: DISCONTINUED | OUTPATIENT
Start: 2023-06-23 | End: 2023-06-28 | Stop reason: HOSPADM

## 2023-06-23 RX ORDER — ALBUTEROL SULFATE 2.5 MG/.5ML
2.5 SOLUTION RESPIRATORY (INHALATION) EVERY 6 HOURS PRN
Status: DISCONTINUED | OUTPATIENT
Start: 2023-06-23 | End: 2023-06-28 | Stop reason: HOSPADM

## 2023-06-23 RX ADMIN — CEFTRIAXONE 1 G: 1 INJECTION, POWDER, FOR SOLUTION INTRAMUSCULAR; INTRAVENOUS at 03:06

## 2023-06-23 RX ADMIN — TIOTROPIUM BROMIDE INHALATION SPRAY 2 PUFF: 3.12 SPRAY, METERED RESPIRATORY (INHALATION) at 04:06

## 2023-06-23 RX ADMIN — CEFEPIME 2 G: 2 INJECTION, POWDER, FOR SOLUTION INTRAVENOUS at 08:06

## 2023-06-23 RX ADMIN — IPRATROPIUM BROMIDE AND ALBUTEROL SULFATE 3 ML: 2.5; .5 SOLUTION RESPIRATORY (INHALATION) at 03:06

## 2023-06-23 RX ADMIN — LEVETIRACETAM 500 MG: 500 TABLET, FILM COATED ORAL at 08:06

## 2023-06-23 RX ADMIN — IPRATROPIUM BROMIDE AND ALBUTEROL SULFATE 3 ML: 2.5; .5 SOLUTION RESPIRATORY (INHALATION) at 08:06

## 2023-06-23 RX ADMIN — APIXABAN 5 MG: 5 TABLET, FILM COATED ORAL at 08:06

## 2023-06-23 RX ADMIN — SENNOSIDES AND DOCUSATE SODIUM 1 TABLET: 50; 8.6 TABLET ORAL at 08:06

## 2023-06-23 RX ADMIN — ATORVASTATIN CALCIUM 40 MG: 40 TABLET, FILM COATED ORAL at 08:06

## 2023-06-23 RX ADMIN — BUDESONIDE 0.25 MG: 0.25 SUSPENSION RESPIRATORY (INHALATION) at 08:06

## 2023-06-23 RX ADMIN — IOHEXOL 75 ML: 350 INJECTION, SOLUTION INTRAVENOUS at 03:06

## 2023-06-23 RX ADMIN — APIXABAN 5 MG: 5 TABLET, FILM COATED ORAL at 03:06

## 2023-06-23 RX ADMIN — MUPIROCIN: 20 OINTMENT TOPICAL at 08:06

## 2023-06-23 RX ADMIN — METOPROLOL SUCCINATE 12.5 MG: 25 TABLET, EXTENDED RELEASE ORAL at 08:06

## 2023-06-23 RX ADMIN — Medication 6 MG: at 08:06

## 2023-06-23 NOTE — PT/OT/SLP PROGRESS
Physical Therapy  Treatment    Patient Name:  Svetlana Beck   MRN:  [id number]    Recommendations:     Discharge Recommendations:  rehabilitation facility   Discharge Equipment Recommendations: to be determined by next level of care   Barriers to discharge: decreased functional mobility, fall risk, and decreased caregiver support    Assessment:     Svetlana Beck is a 72 y.o. female admitted with a medical diagnosis of Encephalopathy.  Pt demonstrates the below listed impairments with decreased tolerance to functional mobility, gait instability, and impaired cognition being the most limiting.  Pt demonstrates improved tolerance to out of bed mobility and is willing to ambulate in the room.  She complains of needing to urinate throughout the session, multiple attempts to educate about turner.  Pt is not safe for home discharge at this time due to patient's status as: a fall risk and cognitively impaired and requires skilled PT.      Impairments and functional limitations:  weakness, impaired endurance, impaired self care skills, impaired functional mobility, gait instability, impaired balance, impaired cognition, decreased coordination, decreased upper extremity function, decreased lower extremity function, decreased safety awareness, impaired cardiopulmonary response to activity.  These deficits affect their roles and responsibilities in which they were able to complete prior to admit.  Rehab Prognosis:   Good ; patient would benefit from acute skilled PT services 4 x/week to address these deficits, improve quality of life, focus on recovery of impairments, provide patient/caregiver education, reduce fall risk, and reach maximum level of function.  Pt is highly  motivated to participated in skilled PT.    Recent Surgery:   Procedure(s) (LRB):  Angiogram, Coronary, with Left Heart Cath (Left) 1 Day Post-Op    Plan:     During this hospitalization, patient to be seen 4 x/week to address the identified rehab impairments  "via gait training, therapeutic activities, therapeutic exercises, neuromuscular re-education and progress toward the following goals:    Plan of Care Expires:  07/22/23    Subjective     Chief Complaint: "Come on pee"  Patient/Family Comments/Goals: Progress to inpatient rehab  Pain/Comfort:  Pain Rating 1: 0/10    Objective:     Communicated with RN prior to session.  Patient found HOB elevated with blood pressure cuff, pulse ox (continuous), telemetry, peripheral IV, turner catheter, restraints, bed alarm upon PT entry to room.     General Precautions: Standard, fall   Orthopedic Precautions:N/A   Braces: N/A  Oxygen Device:      Functional Mobility:  Bed Mobility:  Rolling Left: SBA  Scooting: SBA  Supine to Sit: SBA  Sit to Supine: Min A  Scooting up to head of bed in supine: Min A  Head of bed position: HOB elevated    Transfers:  Sit to Stand: CGA with RW and with cues for hand placement  Toilet transfer: SBA with RW using Step Transfer    Gait: Patient ambulated 10', 45', 35' with RW and Min A. Patient demonstrates occasional unsteady gait, decreased step length, flexed posture, decreased clara, and poor walker management. All lines remained intact throughout ambulation trial, gait belt utilized.  Poor walker management, assist for safety     Balance:   Position Score Time   Static Sitting GOOD-: Takes MODERATE challenges but inconstantly  n/a   Dynamic Sitting FAIR+: Maintains balance through MINIMAL excursions of active trunk motion n/a   Static Standing FAIR: Maintains without assist, but is unable to take any challenges n/a   Dynamic Standing FAIR-: Maintains without assist but is inconsistent n/a       AM-PAC 6 CLICK MOBILITY  Turning over in bed (including adjusting bedclothes, sheets and blankets)?: 3  Sitting down on and standing up from a chair with arms (e.g., wheelchair, bedside commode, etc.): 3  Moving from lying on back to sitting on the side of the bed?: 3  Moving to and from a bed to a chair " (including a wheelchair)?: 3  Need to walk in hospital room?: 3  Climbing 3-5 steps with a railing?: 2  Basic Mobility Total Score: 17     Therapeutic Activities:  Patient educated on role of acute care PT and PT POC, safety while in hospital including calling nurse for mobility, call light usage, benefits of out of bed mobility, walker management, breathing technique, fall risk, assistive device use, bed mobility , transfers, gait technique, positioning, posture, risks of prolonged bed rest, possible discharge disposition , and benefits of continued PT by explanation and demonstration.    Patient demonstrates good understanding of education provided this day.   Whiteboard updated    Therapeutic Exercises:  2 sets of 10 reps of LAQs and marches while sitting in room.      Patient left HOB elevated with all lines intact, call button in reach, bed alarm on, restraints reapplied at end of session, and RN notified.    GOALS:   Multidisciplinary Problems       Physical Therapy Goals          Problem: Physical Therapy    Goal Priority Disciplines Outcome Goal Variances Interventions   Physical Therapy Goal     PT, PT/OT Ongoing, Progressing     Description: Goals to be met by: 23    Patient will increase functional independence with mobility by performin. Supine <>sit with Modified [place]  2. Sit to stand transfer with Modified [place]  3. Gait  x 150 feet with Supervision with or without using LRAD   4. Stand for 5 minutes with Supervision using No Assistive Device  5. Lower extremity exercise program x20 reps per handout, with supervision                         Time Tracking:     PT Received On: 23  PT Start Time: 1304     PT Stop Time: 1333  PT Total Time (min): 29 min     Billable Minutes: Gait Training 20 and Therapeutic Exercise 9    Treatment Type: Treatment  PT/PTA: PT     Number of PTA visits since last PT visit: 0     2023

## 2023-06-23 NOTE — PROGRESS NOTES
"[facility] - Cardiac Intensive Care  Neurosurgery  Progress Note    Subjective:     History of Present Illness: Patient is a 72F with PMH asthma and COPD who presents for evaluation of ICH vs mass. No family or friends present, patient is somewhat of a poor historian. Reports she woke up on the ground Monday, does not remember falling. She states she went to the ED in [place] and was told she had a head bleed and was discharged home. She presented to the ED today with confusion. Reports worsening SOB x 3 days otherwise asymptomatic. She is tremulous. Denies headache, neck pain, numbness, paresthesias, back pain, hand clumsiness, gait instability, seizures, abdominal pain, weight loss. Reports daily cigarette use. Reports mother  of "cancer in the chest." Also endorses chronic diarrhea. Denies use of antiplatelets or anticoagulants.      Post-Op Info:  Procedure(s) (LRB):  Angiogram, Coronary, with Left Heart Cath (Left)   1 Day Post-Op     Interval History: : EF 18%, taken to cath lab yesterday for LHC, non obstructive CAD. Remains neuro intact other than confusion, oriented x2 this morning.     Medications:  Continuous Infusions:  Scheduled Meds:   atorvastatin  40 mg Oral Daily    budesonide  0.25 mg Nebulization Daily    ceFEPime (MAXIPIME) IVPB  2 g Intravenous Q8H    levETIRAcetam  500 mg Oral BID    metoprolol succinate  12.5 mg Oral Daily    mupirocin   Nasal BID    polyethylene glycol  17 g Oral Daily    senna-docusate 8.6-50 mg  1 tablet Oral BID     PRN Meds:acetaminophen, albuterol-ipratropium, dextrose 10%, dextrose 10%, dextrose, dextrose, glucagon (human recombinant), naloxone, ondansetron, sodium chloride 0.9%, sodium chloride 0.9%     Review of Systems  Objective:     Weight: 69 kg (152 lb 1.9 oz)  Body mass index is 26.11 kg/m².  Vital Signs (Most Recent):  Temp: 98.7 °F (37.1 °C) (23 0700)  Pulse: 74 (23 1000)  Resp: (!) 25 (23 1000)  BP: (!) 142/112 (23 " "1000)  SpO2: 100 % (06/23/23 1000) Vital Signs (24h Range):  Temp:  [98.2 °F (36.8 °C)-98.7 °F (37.1 °C)] 98.7 °F (37.1 °C)  Pulse:  [51-76] 74  Resp:  [15-43] 25  SpO2:  [92 %-100 %] 100 %  BP: ()/() 142/112     Date 06/23/23 0700 - 06/24/23 0659   Shift 5432-8352 2051-1122 7178-4058 24 Hour Total   INTAKE   IV Piggyback 100.8   100.8   Shift Total(mL/kg) 100.8(1.5)   100.8(1.5)   OUTPUT   Urine(mL/kg/hr) 160   160   Shift Total(mL/kg) 160(2.3)   160(2.3)   Weight (kg) 69 69 69 69                            Urethral Catheter 06/21/23 2245 Straight-tip;Silicone 16 Fr. (Active)   Site Assessment Clean;Intact 06/23/23 0701   Collection Container Urimeter 06/23/23 0701   Securement Method secured to top of thigh w/ adhesive device 06/23/23 0701   Catheter Care Performed yes 06/23/23 0701   Reason for Continuing Urinary Catheterization Critically ill in ICU and requiring hourly monitoring of intake/output 06/23/23 0701   CAUTI Prevention Bundle Securement Device in place with 1" slack;Intact seal between catheter & drainage tubing;Drainage bag/urimeter off the floor;Sheeting clip in use;No dependent loops or kinks;Drainage bag/urimeter not overfilled (<2/3 full);Drainage bag/urimeter below bladder 06/23/23 0701   Output (mL) 40 mL 06/23/23 1000          Physical Exam         Neurosurgery Physical Exam    Neurosurgery Physical Exam    General: well developed, well nourished, no distress.   HEENT: normocephalic, atraumatic  CV: regular rate   Pulmonary: normal respirations, no signs of respiratory distress  Abdomen: soft, non-distended, not tender to palpation  Skin: Skin is warm, dry and intact  Heme: no bruising    Neuro:   Mental Status: AO x2, confusion  CN: PERRL, EOMI, VF intact to confrontation, sensation intact bilaterally, eyebrow raise and grimace symmetric, tongue midline  Motor: moves all extremities spontaneously, full strength throughout, no pronator drift   Sensory: intact to light touch " throughout  Cerebellar: finger to nose intact bilaterally    Significant Labs:  Recent Labs   Lab 06/21/23  1239 06/22/23  0400 06/23/23  0359    95 79    143 138   K 3.3* 3.9 3.5    105 102   CO2 19* 25 26   BUN 16 17 19   CREATININE 0.8 0.7 0.7   CALCIUM 8.9 8.6* 7.5*   MG  --  2.1 2.0     Recent Labs   Lab 06/21/23  1239 06/22/23  0523 06/23/23  0359   WBC 12.06 15.11* 13.06*   HGB 14.9 13.6 13.0   HCT 44.6 39.9 37.9    266 223     Recent Labs   Lab 06/21/23  1721   INR 1.0   APTT 22.1     Microbiology Results (last 7 days)       Procedure Component Value Units Date/Time    Blood culture [[id number]] Collected: 06/22/23 1650    Order Status: Completed Specimen: Blood from Peripheral, Hand, Left Updated: 06/23/23 0915     Blood Culture, Routine No Growth to date    Blood culture [[id number]] Collected: 06/22/23 1652    Order Status: Completed Specimen: Blood from Peripheral, Antecubital, Left Updated: 06/23/23 0915     Blood Culture, Routine No Growth to date    Blood culture [[id number]]  (Abnormal) Collected: 06/21/23 2250    Order Status: Completed Specimen: Blood from Peripheral, Forearm, Right Updated: 06/23/23 0823     Blood Culture, Routine Gram stain aer bottle: Gram negative rods      Results called to and read back by:Pablito Syed RN 06/22/2023  15:06      GRAM NEGATIVE JOSE  Identification pending  For susceptibility see order #[id number]      Blood culture [[id number]]  (Abnormal) Collected: 06/21/23 2251    Order Status: Completed Specimen: Blood from Peripheral, Antecubital, Right Updated: 06/23/23 0822     Blood Culture, Routine Gram stain margarita bottle: Gram negative rods      Results called to and read back by: Pablito Syed RN 06/22/2023  15:06      GRAM NEGATIVE JOSE  Identification and susceptibility pending      Urine culture [[id number]] Collected: 06/22/23 1825    Order Status: No result Specimen: Urine Updated: 06/22/23 1955    Rapid Organism ID by PCR (from Blood culture)  [[id number]]  (Abnormal) Collected: 06/21/23 2934    Order Status: Completed Updated: 06/22/23 1179     Enterococcus faecalis Not Detected     Enterococcus faecium Not Detected     Listeria monocytogenes Not Detected     Staphylococcus spp. Not Detected     Staphylococcus aureus Not Detected     Staphylococcus epidermidis Not Detected     Staphylococcus lugdunensis Not Detected     Streptococcus species Not Detected     Streptococcus agalactiae Not Detected     Streptococcus pneumoniae Not Detected     Streptococcus pyogenes Not Detected     Acinetobacter calcoaceticus/baumannii complex Not Detected     Bacteroides fragilis Not Detected     Enterobacterales See species for ID     Enterobacter cloacae complex Not Detected     Escherichia coli Detected     Klebsiella aerogenes Not Detected     Klebsiella oxytoca Not Detected     Klebsiella pneumoniae group Not Detected     Proteus Not Detected     Salmonella sp Not Detected     Serratia marcescens Not Detected     Haemophilus influenzae Not Detected     Neisseria meningtidis Not Detected     Pseudomonas aeruginosa Not Detected     Stenotrophomonas maltophilia Not Detected     Candida albicans Not Detected     Candida auris Not Detected     Candida glabrata Not Detected     Candida krusei Not Detected     Candida parapsilosis Not Detected     Candida tropicalis Not Detected     Cryptococcus neoformans/gattii Not Detected     CTX-M (ESBL ) Not Detected     IMP (Carbapenem resistant) Not Detected     KPC resistance gene (Carbapenem resistant) Not Detected     mcr-1  Not Detected     mec A/C  Not Detected     mec A/C and MREJ (MRSA) gene Not Detected     NDM (Carbapenem resistant) Not Detected     OXA-48-like (Carbapenem resistant) Not Detected     van A/B (VRE gene) Not Detected     VIM (Carbapenem resistant) Not Detected          All pertinent labs from the last 24 hours have been reviewed.    Significant Diagnostics:  I have reviewed all pertinent imaging  results/findings within the past 24 hours.    Assessment/Plan:     Intracranial hemorrhage  Patient is a 72F with PMH asthma and COPD who presents after a fall for evaluation of right frontal hyperdensity with surrounding edema concerning for infarct with hemorrhagic conversion. Cardiology following, EF 18%, taken to cath lab 6/22.     Neuro exam stable with confusion, otherwise neuro intact.     -- Admitted CICU  -- All labs and diagnostics reviewed  -- Q4h neurochecks   -- MRI concerning for focal infarct possible venous embolic etiology  -- CTA 6/23 with stable bleed, no evidence of aneurysm or AVM  -- From ICH standpoint, recommend holding ASA for 7 days post bleed, plavix for 14 days post bleed.    --Received ASA/plavix in ED, currently not ordered  -- SBP <160  -- Na >135  -- HOB >30  -- No neurosurgical intervention indicated at this time, NSGY will sign off. Please call if any questions.  -- Discussed with Dr. Tamir López MD  Neurosurgery  [facility] - Cardiac Intensive Care

## 2023-06-23 NOTE — NURSING
Pt arrived to room via bed from ICU.  Pt aaox1, states her name appropriately and holds coherent conversations with nurse.  However pt confused in time, place, and situation.  Bilateral wrist restraints assessed with no breakdown to wrists.  Bed is low and locked.  Call light within reach.  Bed alarm in place.      Nurses Note -- 4 Eyes      6/23/2023   5:34 PM      Skin assessed during: Transfer      [x] No Altered Skin Integrity Present    []Prevention Measures Documented      [] Yes- Altered Skin Integrity Present or Discovered   [] LDA Added if Not in Epic (Describe Wound)   [] New Altered Skin Integrity was Present on Admit and Documented in LDA   [] Wound Image Taken    Wound Care Consulted? No    Attending Nurse:  Katie Stewart RN     Second RN/Staff Member:  Pablito WETZEL

## 2023-06-23 NOTE — SUBJECTIVE & OBJECTIVE
Neurologic Chief Complaint: Hemorrhagic conversion of R MCA infarct    Subjective:     Interval History: Patient is seen for follow-up neurological assessment and treatment recommendations:   NAEO, neuro exam stable and nonfocal. NIHSS 0. CTA overnight with stable hemorrhagic conversion and no LVO/high grade stenosis. Eliquis started for stroke prevention due to EF 18%. Cefepime switched to ceftriaxone for e coli bacteremia. Pulmonology curbsided for recs due to history of COPD, appreciate assistance. SLP consulted for swallow eval. Dispo recs for IPR, placement pending.    HPI, Past Medical, Family, and Social History remains the same as documented in the initial encounter.     Review of Systems   Constitutional:  Negative for fatigue.   HENT:  Negative for drooling and trouble swallowing.    Eyes:  Negative for visual disturbance.   Respiratory:  Positive for choking (occaisionally while eating).    Cardiovascular:  Negative for palpitations.   Gastrointestinal:  Negative for nausea and vomiting.   Musculoskeletal:  Negative for neck stiffness.   Skin:  Negative for color change.   Neurological:  Negative for speech difficulty, weakness and headaches.   Psychiatric/Behavioral:  Positive for confusion (intermittently overnight, currently fully oriented).    All other systems reviewed and are negative.  Scheduled Meds:   albuterol-ipratropium  3 mL Nebulization Q4H WAKE    apixaban  5 mg Oral BID    atorvastatin  40 mg Oral Daily    cefTRIAXone (ROCEPHIN) IVPB  1 g Intravenous Q24H    levETIRAcetam  500 mg Oral BID    melatonin  6 mg Oral Nightly    metoprolol succinate  12.5 mg Oral Daily    mupirocin   Nasal BID    polyethylene glycol  17 g Oral Daily    senna-docusate 8.6-50 mg  1 tablet Oral BID    tiotropium bromide  2 puff Inhalation Daily     Continuous Infusions:  PRN Meds:acetaminophen, albuterol sulfate, dextrose 10%, dextrose 10%, dextrose, dextrose, glucagon (human recombinant), naloxone, nicotine,  ondansetron, sodium chloride 0.9%, sodium chloride 0.9%    Objective:     Vital Signs (Most Recent):  Temp: 98.7 °F (37.1 °C) (06/23/23 1500)  Pulse: 68 (06/23/23 1611)  Resp: (!) 24 (06/23/23 1611)  BP: (!) 122/44 (06/23/23 1600)  SpO2: 100 % (06/23/23 1611)  BP Location: Left leg    Vital Signs Range (Last 24H):  Temp:  [98.2 °F (36.8 °C)-98.7 °F (37.1 °C)]   Pulse:  [53-76]   Resp:  [16-36]   BP: ()/()   SpO2:  [92 %-100 %]   BP Location: Left leg       Physical Exam  Vitals and nursing note reviewed.   Constitutional:       General: She is not in acute distress.  HENT:      Head: Normocephalic.      Nose: Nose normal.      Mouth/Throat:      Pharynx: No oropharyngeal exudate or posterior oropharyngeal erythema.   Eyes:      Extraocular Movements: Extraocular movements intact.      Conjunctiva/sclera: Conjunctivae normal.   Cardiovascular:      Rate and Rhythm: Normal rate.   Pulmonary:      Effort: Pulmonary effort is normal. No respiratory distress.   Abdominal:      General: There is no distension.   Musculoskeletal:         General: No deformity or signs of injury.      Cervical back: Normal range of motion. No rigidity.   Skin:     General: Skin is warm and dry.   Neurological:      Mental Status: She is alert and oriented to person, place, and time.      Cranial Nerves: No dysarthria or facial asymmetry.      Sensory: Sensation is intact.      Motor: No weakness, tremor or seizure activity.      Coordination: Coordination is intact.   Psychiatric:         Attention and Perception: Attention normal.         Behavior: Behavior normal. Behavior is cooperative.            Neurological Exam:   LOC: alert  Attention Span: Good   Language: No aphasia  Articulation: No dysarthria  Orientation: Person, Place, Time   Visual Fields: Full  EOM (CN III, IV, VI): Full/intact  Facial Sensation (CN V): Normal  Facial Movement (CN VII): Symmetric facial expression    Motor: LUE Normal 5/5, LLE Normal 5/5, RUE  Normal 5/5, RLE Normal 5/5  Sensation: Intact to light touch      Laboratory:  CMP:   Recent Labs   Lab 06/23/23  0359   CALCIUM 7.5*   ALBUMIN 3.0*   PROT 6.4      K 3.5   CO2 26      BUN 19   CREATININE 0.7   ALKPHOS 40*   ALT 26   AST 64*   BILITOT 0.5     CBC:   Recent Labs   Lab 06/23/23  0359   WBC 13.06*   RBC 3.50*   HGB 13.0   HCT 37.9      *   MCH 37.1*   MCHC 34.3     Lipid Panel:   Recent Labs   Lab 06/23/23  0359   CHOL 128   LDLCALC 65.6   HDL 38*   TRIG 122     Coagulation:   Recent Labs   Lab 06/21/23  1721   INR 1.0   APTT 22.1     Hgb A1C:   Recent Labs   Lab 06/23/23  0359   HGBA1C 4.6     TSH: No results for input(s): TSH in the last 168 hours.    Diagnostic Results     Brain Imaging   MRI brain W WO contrast 6/22/2023  Moderate sized right frontal hemorrhagic infarction, similar to prior CT.  No underlying mass lesions appreciated.  Follow up examination as clinically indicated.     CTH without contrast 6/21/2023  Hyperdense mass right posterior frontal region could represent a hematoma.  This is probably most likely.  A brain neoplasm is thought to be less likely.  For further evaluation MRI of the brain without with contrast is recommended.      Vessel Imaging   CTA head/neck 6/23/2023  Right frontal hemorrhagic infarct again identified with mild surrounding edema and localized mass effect.  No midline shift.   CTA shows no high-grade stenosis or large vessel occlusion.  No vascular malformation underlying the right the frontal hemorrhage.   Incidental note is made of a 4 mm right ICA aneurysm.  Recommend follow-up to ensure long-term stability.  This can likely be followed with MRA.   Centrilobular emphysematous changes at the lung apices.     Cardiac Imaging   TTE 6/22/2023  The left ventricle is normal in size with mild eccentric hypertrophy and severely decreased systolic function.  The estimated ejection fraction is 18%.  There are segmental left ventricular wall  motion abnormalities.  Grade I left ventricular diastolic dysfunction.  Normal right ventricular size with normal right ventricular systolic function.  Elevated central venous pressure (15 mmHg).  Trivial posterior pericardial effusion. Just at the base.  Compared with 10/27/2020, marked worsening has occured.

## 2023-06-23 NOTE — CARE UPDATE
"Patient restless, constantly picking at arm band, IV sites, gown, tele leads. Continually attempting to get out of bed "to go pee." When reminded she has a catheter, and she can just relax and it will take care of the urine for her, she states "yeah, I know" and then immediately attempts to get up again. Short-term memory poor, safety awareness poor. Confirmed turner catheter is in place, draining urine appropriately. Remains pleasant, conversant. No other changes in neuro exam. No focal deficits, no change in speech, unilateral strength, or facial symmetry.   Additionally, pt still with expiratory wheeze, shortness of breath with exertion. Per pt sister, patient was an active cigarette smoker up until her admission to the hospital 6/21. Patient also reports that she drinks 1-2 beers per day.  Asked MD about melatonin to help pt with natural sleep cycle, nicotine patch for nicotine withdrawal. Deferred at this time.  Will maintain fall precautions and continue to monitor closely.  "

## 2023-06-23 NOTE — ASSESSMENT & PLAN NOTE
Patient is a 72F with PMH asthma and COPD who presents after a fall for evaluation of right frontal hyperdensity with surrounding edema concerning for infarct with hemorrhagic conversion. Cardiology following, EF 18%, taken to cath lab 6/22.     Neuro exam stable with confusion, otherwise neuro intact.     -- Admitted CICU  -- All labs and diagnostics reviewed  -- Q4h neurochecks   -- MRI concerning for focal infarct possible venous embolic etiology  -- CTA 6/23 with stable bleed, no evidence of aneurysm or AVM  -- From ICH standpoint, recommend holding ASA for 7 days post bleed, plavix for 14 days post bleed.    --Received ASA/plavix in ED, currently not ordered  -- SBP <160  -- Na >135  -- HOB >30  -- No neurosurgical intervention indicated at this time, NSGY will sign off. Please call if any questions.  -- Discussed with Dr. Garnett

## 2023-06-23 NOTE — PT/OT/SLP PROGRESS
Occupational Therapy   Treatment    Name: Svetlana Beck  MRN: [id number]  Admitting Diagnosis:  Encephalopathy  1 Day Post-Op    Recommendations:     Discharge Recommendations: rehabilitation facility  Discharge Equipment Recommendations:  to be determined by next level of care  Barriers to discharge:       Assessment:     Svetlana Beck is a 72 y.o. female with a medical diagnosis of Encephalopathy. Patient presents with the following impairments/functional limitations: weakness, impaired endurance, impaired self care skills, impaired functional mobility, gait instability, impaired balance, impaired cognition, decreased coordination, decreased lower extremity function, decreased safety awareness. Patient ambulated to toilet and sink with CGA-min A and HHA. Patient's lost her balance 4x with ambulation and required physical assistance to direct her center of mass over her ERNIE. Patient likely requires hands on assistance and a gait belt with all ambulatory activity due to gait instability and poor attention. Patient would benefit from continued skilled acute OT services at this time to address ADLs, safety awareness, gait instability, and functional mobility. OT continues to recommend rehab services at d/c once medically appropriate to improve ADL independence, functional mobility, and quality of life.    Rehab Prognosis:  Good; patient would benefit from acute skilled OT services to address these deficits and reach maximum level of function.       Plan:     Patient to be seen 4 x/week to address the above listed problems via self-care/home management, therapeutic activities, therapeutic exercises  Plan of Care Expires: 07/22/23  Plan of Care Reviewed with: patient, sibling    Subjective     Patient/Family Comments/goals: Return to prior level of function   Pain/Comfort:  Pain Rating 1: 0/10    Objective:     Communicated with: RN prior to session.  Patient found supine in bed eating lunch with blood pressure cuff,  pulse ox (continuous), telemetry, turner catheter, bed alarm, unsecured wrist restraints upon OT entry to room.    General Precautions: Standard, fall    Orthopedic Precautions:N/A  Braces: N/A  Respiratory Status: Room air     Occupational Performance:     Bed Mobility:    Patient completed Rolling/Turning to Right with contact guard assistance  Patient completed Scooting/Bridging with contact guard assistance  Patient completed Supine to Sit with contact guard assistance     Functional Mobility/Transfers:  Patient completed Sit <> Stand Transfer with minimum assistance  with  hand-held assist   Patient completed Toilet Transfer Step Transfer technique with minimum assistance with  hand-held assist  Functional Mobility: Patient ambulated 30' in room with CGA-min A and HHA.     Activities of Daily Living:  Grooming: Pt brushed her teeth in standing at the sink with CGA-min A secondary to poor balance and gait instability. Pt required help opening the toothpaste and several verbal cues to redirect her attention to the task.   Toileting: Pt wiped herself in sitting on the toilet with SBA. Pt sat on the toilet for 6 minutes and was convinced she would urinate despite having catheter. Pt required verbal cueing to initiate wiping after bowel movement.       [facility] 6 Click ADL: 19    Treatment & Education:  Pt was encouraged to look at herself in the mirror while brushing her teeth to promote upright posture.   Pt was given verbal and physical cues to align her center of mass over her ERNIE with ambulation.   Pt was educated on the purpose of the the turner catheter.  OT plan of care discussed with the patient.     Patient left up in chair with all lines intact, call button in reach, RN notified, and sister present. Patient's sister agreed to alert nursing before she left so pt could be transferred back to bed.     GOALS:   Multidisciplinary Problems       Occupational Therapy Goals          Problem: Occupational Therapy     Goal Priority Disciplines Outcome Interventions   Occupational Therapy Goal     OT, PT/OT Ongoing, Progressing    Description: Goals to be met by: 7/20/23     Patient will increase functional independence with ADLs by performing:    UE Dressing with Modified [place].  LE Dressing with Stand-by Assistance.  Grooming while standing with Stand-by Assistance.  Toileting from toilet/bedside commode with Stand-by Assistance for hygiene and clothing management.   Toilet transfer to toilet/bedside commode with Supervision and LRAD as needed.                         Time Tracking:     OT Date of Treatment: 06/23/23  OT Start Time: 0214  OT Stop Time: 0241  OT Total Time (min): 27 min    Billable Minutes:Self Care/Home Management 15  Therapeutic Activity 12    OT/BARB: OT          6/23/2023

## 2023-06-23 NOTE — SUBJECTIVE & OBJECTIVE
Interval History: 6/23: EF 18%, taken to cath lab yesterday for LHC, non obstructive CAD. Remains neuro intact other than confusion, oriented x2 this morning.     Medications:  Continuous Infusions:  Scheduled Meds:   atorvastatin  40 mg Oral Daily    budesonide  0.25 mg Nebulization Daily    ceFEPime (MAXIPIME) IVPB  2 g Intravenous Q8H    levETIRAcetam  500 mg Oral BID    metoprolol succinate  12.5 mg Oral Daily    mupirocin   Nasal BID    polyethylene glycol  17 g Oral Daily    senna-docusate 8.6-50 mg  1 tablet Oral BID     PRN Meds:acetaminophen, albuterol-ipratropium, dextrose 10%, dextrose 10%, dextrose, dextrose, glucagon (human recombinant), naloxone, ondansetron, sodium chloride 0.9%, sodium chloride 0.9%     Review of Systems  Objective:     Weight: 69 kg (152 lb 1.9 oz)  Body mass index is 26.11 kg/m².  Vital Signs (Most Recent):  Temp: 98.7 °F (37.1 °C) (06/23/23 0700)  Pulse: 74 (06/23/23 1000)  Resp: (!) 25 (06/23/23 1000)  BP: (!) 142/112 (06/23/23 1000)  SpO2: 100 % (06/23/23 1000) Vital Signs (24h Range):  Temp:  [98.2 °F (36.8 °C)-98.7 °F (37.1 °C)] 98.7 °F (37.1 °C)  Pulse:  [51-76] 74  Resp:  [15-43] 25  SpO2:  [92 %-100 %] 100 %  BP: ()/() 142/112     Date 06/23/23 0700 - 06/24/23 0659   Shift 9508-6713 2248-1406 4983-4292 24 Hour Total   INTAKE   IV Piggyback 100.8   100.8   Shift Total(mL/kg) 100.8(1.5)   100.8(1.5)   OUTPUT   Urine(mL/kg/hr) 160   160   Shift Total(mL/kg) 160(2.3)   160(2.3)   Weight (kg) 69 69 69 69                            Urethral Catheter 06/21/23 2210 Straight-tip;Silicone 16 Fr. (Active)   Site Assessment Clean;Intact 06/23/23 0701   Collection Container Urimeter 06/23/23 0701   Securement Method secured to top of thigh w/ adhesive device 06/23/23 0701   Catheter Care Performed yes 06/23/23 0701   Reason for Continuing Urinary Catheterization Critically ill in ICU and requiring hourly monitoring of intake/output 06/23/23 0701   CAUTI Prevention Bundle  "Securement Device in place with 1" slack;Intact seal between catheter & drainage tubing;Drainage bag/urimeter off the floor;Sheeting clip in use;No dependent loops or kinks;Drainage bag/urimeter not overfilled (<2/3 full);Drainage bag/urimeter below bladder 06/23/23 0701   Output (mL) 40 mL 06/23/23 1000          Physical Exam         Neurosurgery Physical Exam    Neurosurgery Physical Exam    General: well developed, well nourished, no distress.   HEENT: normocephalic, atraumatic  CV: regular rate   Pulmonary: normal respirations, no signs of respiratory distress  Abdomen: soft, non-distended, not tender to palpation  Skin: Skin is warm, dry and intact  Heme: no bruising    Neuro:   Mental Status: AO x2, confusion  CN: PERRL, EOMI, VF intact to confrontation, sensation intact bilaterally, eyebrow raise and grimace symmetric, tongue midline  Motor: moves all extremities spontaneously, full strength throughout, no pronator drift   Sensory: intact to light touch throughout  Cerebellar: finger to nose intact bilaterally    Significant Labs:  Recent Labs   Lab 06/21/23  1239 06/22/23  0400 06/23/23  0359    95 79    143 138   K 3.3* 3.9 3.5    105 102   CO2 19* 25 26   BUN 16 17 19   CREATININE 0.8 0.7 0.7   CALCIUM 8.9 8.6* 7.5*   MG  --  2.1 2.0     Recent Labs   Lab 06/21/23  1239 06/22/23  0523 06/23/23  0359   WBC 12.06 15.11* 13.06*   HGB 14.9 13.6 13.0   HCT 44.6 39.9 37.9    266 223     Recent Labs   Lab 06/21/23  1721   INR 1.0   APTT 22.1     Microbiology Results (last 7 days)       Procedure Component Value Units Date/Time    Blood culture [[id number]] Collected: 06/22/23 1650    Order Status: Completed Specimen: Blood from Peripheral, Hand, Left Updated: 06/23/23 0915     Blood Culture, Routine No Growth to date    Blood culture [[id number]] Collected: 06/22/23 1652    Order Status: Completed Specimen: Blood from Peripheral, Antecubital, Left Updated: 06/23/23 0915     Blood Culture, " Routine No Growth to date    Blood culture [[id number]]  (Abnormal) Collected: 06/21/23 2250    Order Status: Completed Specimen: Blood from Peripheral, Forearm, Right Updated: 06/23/23 0823     Blood Culture, Routine Gram stain aer bottle: Gram negative rods      Results called to and read back by:Pablito Syed RN 06/22/2023  15:06      GRAM NEGATIVE JOSE  Identification pending  For susceptibility see order #[id number]      Blood culture [[id number]]  (Abnormal) Collected: 06/21/23 2251    Order Status: Completed Specimen: Blood from Peripheral, Antecubital, Right Updated: 06/23/23 0822     Blood Culture, Routine Gram stain margarita bottle: Gram negative rods      Results called to and read back by: Pablito Syed RN 06/22/2023  15:06      GRAM NEGATIVE JOSE  Identification and susceptibility pending      Urine culture [[id number]] Collected: 06/22/23 1825    Order Status: No result Specimen: Urine Updated: 06/22/23 1955    Rapid Organism ID by PCR (from Blood culture) [[id number]]  (Abnormal) Collected: 06/21/23 2251    Order Status: Completed Updated: 06/22/23 1555     Enterococcus faecalis Not Detected     Enterococcus faecium Not Detected     Listeria monocytogenes Not Detected     Staphylococcus spp. Not Detected     Staphylococcus aureus Not Detected     Staphylococcus epidermidis Not Detected     Staphylococcus lugdunensis Not Detected     Streptococcus species Not Detected     Streptococcus agalactiae Not Detected     Streptococcus pneumoniae Not Detected     Streptococcus pyogenes Not Detected     Acinetobacter calcoaceticus/baumannii complex Not Detected     Bacteroides fragilis Not Detected     Enterobacterales See species for ID     Enterobacter cloacae complex Not Detected     Escherichia coli Detected     Klebsiella aerogenes Not Detected     Klebsiella oxytoca Not Detected     Klebsiella pneumoniae group Not Detected     Proteus Not Detected     Salmonella sp Not Detected     Serratia marcescens Not Detected      Haemophilus influenzae Not Detected     Neisseria meningtidis Not Detected     Pseudomonas aeruginosa Not Detected     Stenotrophomonas maltophilia Not Detected     Candida albicans Not Detected     Candida auris Not Detected     Candida glabrata Not Detected     Candida krusei Not Detected     Candida parapsilosis Not Detected     Candida tropicalis Not Detected     Cryptococcus neoformans/gattii Not Detected     CTX-M (ESBL ) Not Detected     IMP (Carbapenem resistant) Not Detected     KPC resistance gene (Carbapenem resistant) Not Detected     mcr-1  Not Detected     mec A/C  Not Detected     mec A/C and MREJ (MRSA) gene Not Detected     NDM (Carbapenem resistant) Not Detected     OXA-48-like (Carbapenem resistant) Not Detected     van A/B (VRE gene) Not Detected     VIM (Carbapenem resistant) Not Detected          All pertinent labs from the last 24 hours have been reviewed.    Significant Diagnostics:  I have reviewed all pertinent imaging results/findings within the past 24 hours.

## 2023-06-23 NOTE — CARE UPDATE
Patient transported to and from CT with RNx1. On continuous cardiac, SpO2, NIBP monitoring  CT head and neck completed without issue  Patient returned to 3087. VSS, no change in neuro status

## 2023-06-23 NOTE — ASSESSMENT & PLAN NOTE
Cardiology consulted on arrival and initially admitted to CICU for further workup of trop elevation with acute EF drop and apical ballooning on bedside echo    Recs as per 6/22 progress note:  WMA consistent with typical Takotsubo cardiomyopathy  [facility] with nonobstructive disease     - Supportive care  - Beta blockade when able, resting HR currently low  - Elevated LVEDP on LHC  - Lasix 40mg IV BID

## 2023-06-23 NOTE — ASSESSMENT & PLAN NOTE
72 year old woman transferred from OSH for imaging evaluation by Neurosurgery due to concern for mass vs hematoma complicated by persistently elevated troponin on arrival requiring admission to CCU for diagnostic left heart cath. Further imaging notable for right frontal infarction with hemorrhagic transformation; likely acute in timeline. NIHSS 4; likely pseudo elevated as patient examined immediately following procedure. No focal deficits noted. Remained in CCU overnight 6/22 and was transferred to  primary service on 6/23am. MRI brain W WO contrast with moderate sized R frontal infarct with hemorrhagic conversion and without evidence of underlying mass. CTA completed overnight which showed stable hemorrhagic infarct with no LVO/critical stenosis and noted incidental 4mm R ICA aneurysm.   Suspect hemorrhagic stroke etiology to be cardio-embolic vs large vessel atherosclerotic disease.    NAEO, neuro exam stable and nonfocal. NIHSS 0. CTA overnight with stable hemorrhagic conversion and no LVO/high grade stenosis, as well as incidental finding of 4mm R ICA aneurysm. Eliquis started for stroke prevention due to EF 18%. Cefepime switched to ceftriaxone for e coli bacteremia. Pulmonology curbsided for recs due to history of COPD, appreciate assistance. SLP consulted for swallow eval. Dispo recs for IPR, placement pending.       Antithrombotics for secondary stroke prevention: Eliquis 5mg BID started 6/23    Statins for secondary stroke prevention and HLD, if present: Statins: Atorvastatin- 40 mg daily    Aggressive risk factor modification: Smoking, Diet, Exercise, CAD     Rehab efforts: The patient has been evaluated by a stroke team provider and the therapy needs have been fully considered based off the presenting complaints and exam findings. The following therapy evaluations are needed: PT evaluate and treat, OT evaluate and treat, SLP evaluate and treat dispo recs for IPR, SLP eval pending    Diagnostics  ordered/pending: None     VTE prophylaxis: Mechanical prophylaxis: Place SCDs  None: Reason for No Pharmacological VTE Prophylaxis: Currently on anticoagulation    BP parameters:  SBP <160

## 2023-06-23 NOTE — PROGRESS NOTES
[facility] - Neurosurgery ([facility])  Vascular Neurology  Comprehensive Stroke Center  Progress Note    Assessment/Plan:     * Embolic stroke involving right middle cerebral artery  72 year old woman transferred from OSH for imaging evaluation by Neurosurgery due to concern for mass vs hematoma complicated by persistently elevated troponin on arrival requiring admission to CCU for diagnostic left heart cath. Further imaging notable for right frontal infarction with hemorrhagic transformation; likely acute in timeline. NIHSS 4; likely pseudo elevated as patient examined immediately following procedure. No focal deficits noted. Remained in CCU overnight 6/22 and was transferred to  primary service on 6/23am. MRI brain W WO contrast with moderate sized R frontal infarct with hemorrhagic conversion and without evidence of underlying mass. CTA completed overnight which showed stable hemorrhagic infarct with no LVO/critical stenosis and noted incidental 4mm R ICA aneurysm.   Suspect hemorrhagic stroke etiology to be cardio-embolic vs large vessel atherosclerotic disease.    NAEO, neuro exam stable and nonfocal. NIHSS 0. CTA overnight with stable hemorrhagic conversion and no LVO/high grade stenosis, as well as incidental finding of 4mm R ICA aneurysm. Eliquis started for stroke prevention due to EF 18%. Cefepime switched to ceftriaxone for e coli bacteremia. Pulmonology curbsided for recs due to history of COPD, appreciate assistance. SLP consulted for swallow eval. Dispo recs for IPR, placement pending.       Antithrombotics for secondary stroke prevention: Eliquis 5mg BID started 6/23    Statins for secondary stroke prevention and HLD, if present: Statins: Atorvastatin- 40 mg daily    Aggressive risk factor modification: Smoking, Diet, Exercise, CAD     Rehab efforts: The patient has been evaluated by a stroke team provider and the therapy needs have been fully considered based off the presenting complaints and exam  findings. The following therapy evaluations are needed: PT evaluate and treat, OT evaluate and treat, SLP evaluate and treat dispo recs for IPR, SLP eval pending    Diagnostics ordered/pending: None     VTE prophylaxis: Mechanical prophylaxis: Place SCDs  None: Reason for No Pharmacological VTE Prophylaxis: Currently on anticoagulation    BP parameters:  SBP <160    Bacteremia due to Gram-negative bacteria  Bcx with GNR, PCR with e coli  Cefepime switched to ceftriaxone 1g daily    Takotsubo cardiomyopathy  Cardiology consulted on arrival and initially admitted to CICU for further workup of trop elevation with acute EF drop and apical ballooning on bedside echo    Recs as per 6/22 progress note:  WMA consistent with typical Takotsubo cardiomyopathy  [facility] with nonobstructive disease     - Supportive care  - Beta blockade when able, resting HR currently low  - Elevated LVEDP on [facility]  - Lasix 40mg IV BID    Smoker  Stroke risk factor  Encourage cessation  Nicotine patch PRN    Chronic obstructive pulmonary disease  History of, on albuterol PRN at home per chart  Pulmonology curbsided , appreciate recs:  --scheduled duonebs q4h while awake x 4 doses  --budesonide stopped, started spiriva 2 puffs daily (continue at dc)  --consider prednisone 40mg for 5 days unless any contraindications from cardiology or neuro perspectives  --refer to pulmonology for outpatient follow up as she will need PFTs for further evaluation of her suspected obstructive lung disease       06/23/2023 NAEO, neuro exam stable and nonfocal. NIHSS 0. CTA overnight with stable hemorrhagic conversion and no LVO/high grade stenosis. Eliquis started for stroke prevention due to EF 18%. Cefepime switched to ceftriaxone for e coli bacteremia. Pulmonology curbsided for recs due to history of COPD, appreciate assistance. SLP consulted for swallow eval. Dispo recs for IPR, placement pending.        STROKE DOCUMENTATION   Acute Stroke Times   Unknown Normal Date:  Unknown Date  Unknown Normal Time: Unknown Time  Unknown Symptom Onset Date: Unknown Date  Unknown Symptom Onset Time: Unknown Time  Thrombolytic Therapy Recommended: No  Thrombectomy Recommended: No    NIH Scale:  1a. Level of Consciousness: 0-->Alert, keenly responsive  1b. LOC Questions: 0-->Answers both questions correctly  1c. LOC Commands: 0-->Performs both tasks correctly  2. Best Gaze: 0-->Normal  3. Visual: 0-->No visual loss  4. Facial Palsy: 0-->Normal symmetrical movements  5a. Motor Arm, Left: 0-->No drift, limb holds 90 (or 45) degrees for full 10 secs  5b. Motor Arm, Right: 0-->No drift, limb holds 90 (or 45) degrees for full 10 secs  6a. Motor Leg, Left: 0-->No drift, leg holds 30 degree position for full 5 secs  6b. Motor Leg, Right: 0-->No drift, leg holds 30 degree position for full 5 secs  7. Limb Ataxia: 0-->Absent  8. Sensory: 0-->Normal, no sensory loss  9. Best Language: 0-->No aphasia, normal  10. Dysarthria: 0-->Normal  11. Extinction and Inattention (formerly Neglect): 0-->No abnormality  Total (NIH Stroke Scale): 0       Modified James    Victoria Coma Scale:    ABCD2 Score:    [id number] Score:   HAS -BLED Score:   ICH Score:   Hunt & Mckeon Classification:      Hemorrhagic change of an Ischemic Stroke: Does this patient have an ischemic stroke with hemorrhagic changes? No     Neurologic Chief Complaint: Hemorrhagic conversion of R MCA infarct    Subjective:     Interval History: Patient is seen for follow-up neurological assessment and treatment recommendations:   NAEO, neuro exam stable and nonfocal. NIHSS 0. CTA overnight with stable hemorrhagic conversion and no LVO/high grade stenosis. Eliquis started for stroke prevention due to EF 18%. Cefepime switched to ceftriaxone for e coli bacteremia. Pulmonology curbsided for recs due to history of COPD, appreciate assistance. SLP consulted for swallow eval. Dispo recs for IPR, placement pending.    HPI, Past Medical, Family, and Social  History remains the same as documented in the initial encounter.     Review of Systems   Constitutional:  Negative for fatigue.   HENT:  Negative for drooling and trouble swallowing.    Eyes:  Negative for visual disturbance.   Respiratory:  Positive for choking (occaisionally while eating).    Cardiovascular:  Negative for palpitations.   Gastrointestinal:  Negative for nausea and vomiting.   Musculoskeletal:  Negative for neck stiffness.   Skin:  Negative for color change.   Neurological:  Negative for speech difficulty, weakness and headaches.   Psychiatric/Behavioral:  Positive for confusion (intermittently overnight, currently fully oriented).    All other systems reviewed and are negative.  Scheduled Meds:   albuterol-ipratropium  3 mL Nebulization Q4H WAKE    apixaban  5 mg Oral BID    atorvastatin  40 mg Oral Daily    cefTRIAXone (ROCEPHIN) IVPB  1 g Intravenous Q24H    levETIRAcetam  500 mg Oral BID    melatonin  6 mg Oral Nightly    metoprolol succinate  12.5 mg Oral Daily    mupirocin   Nasal BID    polyethylene glycol  17 g Oral Daily    senna-docusate 8.6-50 mg  1 tablet Oral BID    tiotropium bromide  2 puff Inhalation Daily     Continuous Infusions:  PRN Meds:acetaminophen, albuterol sulfate, dextrose 10%, dextrose 10%, dextrose, dextrose, glucagon (human recombinant), naloxone, nicotine, ondansetron, sodium chloride 0.9%, sodium chloride 0.9%    Objective:     Vital Signs (Most Recent):  Temp: 98.7 °F (37.1 °C) (06/23/23 1500)  Pulse: 68 (06/23/23 1611)  Resp: (!) 24 (06/23/23 1611)  BP: (!) 122/44 (06/23/23 1600)  SpO2: 100 % (06/23/23 1611)  BP Location: Left leg    Vital Signs Range (Last 24H):  Temp:  [98.2 °F (36.8 °C)-98.7 °F (37.1 °C)]   Pulse:  [53-76]   Resp:  [16-36]   BP: ()/()   SpO2:  [92 %-100 %]   BP Location: Left leg       Physical Exam  Vitals and nursing note reviewed.   Constitutional:       General: She is not in acute distress.  HENT:      Head:  Normocephalic.      Nose: Nose normal.      Mouth/Throat:      Pharynx: No oropharyngeal exudate or posterior oropharyngeal erythema.   Eyes:      Extraocular Movements: Extraocular movements intact.      Conjunctiva/sclera: Conjunctivae normal.   Cardiovascular:      Rate and Rhythm: Normal rate.   Pulmonary:      Effort: Pulmonary effort is normal. No respiratory distress.   Abdominal:      General: There is no distension.   Musculoskeletal:         General: No deformity or signs of injury.      Cervical back: Normal range of motion. No rigidity.   Skin:     General: Skin is warm and dry.   Neurological:      Mental Status: She is alert and oriented to person, place, and time.      Cranial Nerves: No dysarthria or facial asymmetry.      Sensory: Sensation is intact.      Motor: No weakness, tremor or seizure activity.      Coordination: Coordination is intact.   Psychiatric:         Attention and Perception: Attention normal.         Behavior: Behavior normal. Behavior is cooperative.            Neurological Exam:   LOC: alert  Attention Span: Good   Language: No aphasia  Articulation: No dysarthria  Orientation: Person, Place, Time   Visual Fields: Full  EOM (CN III, IV, VI): Full/intact  Facial Sensation (CN V): Normal  Facial Movement (CN VII): Symmetric facial expression    Motor: LUE Normal 5/5, LLE Normal 5/5, RUE Normal 5/5, RLE Normal 5/5  Sensation: Intact to light touch      Laboratory:  CMP:   Recent Labs   Lab 06/23/23  0359   CALCIUM 7.5*   ALBUMIN 3.0*   PROT 6.4      K 3.5   CO2 26      BUN 19   CREATININE 0.7   ALKPHOS 40*   ALT 26   AST 64*   BILITOT 0.5     CBC:   Recent Labs   Lab 06/23/23  0359   WBC 13.06*   RBC 3.50*   HGB 13.0   HCT 37.9      *   MCH 37.1*   MCHC 34.3     Lipid Panel:   Recent Labs   Lab 06/23/23  0359   CHOL 128   LDLCALC 65.6   HDL 38*   TRIG 122     Coagulation:   Recent Labs   Lab 06/21/23  1721   INR 1.0   APTT 22.1     Hgb A1C:   Recent Labs    Lab 06/23/23  0359   HGBA1C 4.6     TSH: No results for input(s): TSH in the last 168 hours.    Diagnostic Results     Brain Imaging   MRI brain W WO contrast 6/22/2023  Moderate sized right frontal hemorrhagic infarction, similar to prior CT.  No underlying mass lesions appreciated.  Follow up examination as clinically indicated.     CTH without contrast 6/21/2023  Hyperdense mass right posterior frontal region could represent a hematoma.  This is probably most likely.  A brain neoplasm is thought to be less likely.  For further evaluation MRI of the brain without with contrast is recommended.      Vessel Imaging   CTA head/neck 6/23/2023  Right frontal hemorrhagic infarct again identified with mild surrounding edema and localized mass effect.  No midline shift.   CTA shows no high-grade stenosis or large vessel occlusion.  No vascular malformation underlying the right the frontal hemorrhage.   Incidental note is made of a 4 mm right ICA aneurysm.  Recommend follow-up to ensure long-term stability.  This can likely be followed with MRA.   Centrilobular emphysematous changes at the lung apices.     Cardiac Imaging   TTE 6/22/2023   The left ventricle is normal in size with mild eccentric hypertrophy and severely decreased systolic function.   The estimated ejection fraction is 18%.   There are segmental left ventricular wall motion abnormalities.   Grade I left ventricular diastolic dysfunction.   Normal right ventricular size with normal right ventricular systolic function.   Elevated central venous pressure (15 mmHg).   Trivial posterior pericardial effusion. Just at the base.   Compared with 10/27/2020, marked worsening has occured.      ALVERTO Perez  Comprehensive Stroke Center  Department of Vascular Neurology   [facility])

## 2023-06-23 NOTE — CARE UPDATE
Patient with historical diagnosis of COPD admitted to stroke service for ICH after being admitted initially to CCU for Takotsubo cardiomyopathy. Patient with some mild wheezing, only on albuterol as outpatient. Pulmonology called to discuss usual management of COPD without a formal diagnosis.     Recommendations  - start on scheduled duonebs q4h while awake for 4 doses  - start on spiriva 2 puffs daily   - discharge with this medication  - recommend 40mg prednisone for 5 days unless any contraindications from cardiology or neuro perpsectives  - refer to pulmonology for outpatient follow up as she will need PFTs for further evaluation of her suspected obstructive lung disease    KELLI Carlson MD  [employer] [facility] Fellow

## 2023-06-23 NOTE — HOSPITAL COURSE
06/23/2023 NAEO, neuro exam stable and nonfocal. NIHSS 0. CTA overnight with stable hemorrhagic conversion and no LVO/high grade stenosis. Eliquis started for stroke prevention due to EF 18%. Cefepime switched to ceftriaxone for e coli bacteremia. Pulmonology curbsided for recs due to history of COPD, appreciate assistance. SLP consulted for swallow eval. Dispo recs for IPR, placement pending.  6/24/2023 Low urine output overnight.  30 cc on bladder scan.  Patient has EF 18% on last TTE.  Cardiology saw patient on 6/22 s/p [facility] and recommended Lasix 40 mg BID, will start.  Patient restless overnight.  She has a self-reported history of drinking 1-2 beers a day.  Last drink 1 month ago.  06/25/2023 NAEO, neuro exam stable. Restlessness improved today, restraints removed. Reports breathing improved with spiriva, will continue at discharge. Pending IPR placement.  06/26/2023 NAEO. This morning was more lethargic and difficult to arouse but still able to move all extremities without any obvious focal deficits, repeat CTH stable. Also notified by RN of concern for aspiration when patient was eating breakfast. Respiratory called to bedside for breathing treatment due to coughing, PRN racepinepherine ordered for audible wheezing and concern for stridor. Awaiting IPR placement, and patient must remain out of restraints x24hrs.  6/28/2023 Stable for discharge to IPR. On cipro for 7 more days for e coli bacteremia. One 100.8F temperature yesterday afternoon, isolated, resolved on its own without need for meds, no leukocytosis or any other complaints. Followup with Mariza Pena in Clinic for R ICA aneurysm per Dr. Raines.

## 2023-06-23 NOTE — PLAN OF CARE
06/23/23 1619   Post-Acute Status   Post-Acute Authorization Placement   Post-Acute Placement Status Referrals Sent     ORQUIDEA called and spoke with pt's sister Verna via phone to review discharge recommendation of rehab and is agreeable to plan    Discussed options and Amber instructed to identify preference.    Preferred Facility: (if more than 1, listed in order of descending preference)  [facility]    Referrals sent via [facility].    If an additional preferred facility not listed above is identified, additional referral to be sent. If above facilities unable to accept, will send additional referrals to in-network providers.       Edilia Quintana, CHARLIE  [facility]  [id number]

## 2023-06-24 PROBLEM — F10.10 ALCOHOL ABUSE, DAILY USE: Status: ACTIVE | Noted: 2023-06-24

## 2023-06-24 LAB
ALBUMIN SERPL BCP-MCNC: 3 G/DL (ref 3.5–5.2)
ALP SERPL-CCNC: 44 U/L (ref 55–135)
ALT SERPL W/O P-5'-P-CCNC: 31 U/L (ref 10–44)
ANION GAP SERPL CALC-SCNC: 8 MMOL/L (ref 8–16)
AST SERPL-CCNC: 45 U/L (ref 10–40)
BACTERIA BLD CULT: ABNORMAL
BASOPHILS # BLD AUTO: 0.01 K/UL (ref 0–0.2)
BASOPHILS NFR BLD: 0.1 % (ref 0–1.9)
BILIRUB SERPL-MCNC: 0.7 MG/DL (ref 0.1–1)
BUN SERPL-MCNC: 13 MG/DL (ref 8–23)
CALCIUM SERPL-MCNC: 8.1 MG/DL (ref 8.7–10.5)
CHLORIDE SERPL-SCNC: 104 MMOL/L (ref 95–110)
CO2 SERPL-SCNC: 26 MMOL/L (ref 23–29)
CREAT SERPL-MCNC: 0.6 MG/DL (ref 0.5–1.4)
DIFFERENTIAL METHOD: ABNORMAL
EOSINOPHIL # BLD AUTO: 0 K/UL (ref 0–0.5)
EOSINOPHIL NFR BLD: 0.4 % (ref 0–8)
ERYTHROCYTE [DISTWIDTH] IN BLOOD BY AUTOMATED COUNT: 11.7 % (ref 11.5–14.5)
EST. GFR  (NO RACE VARIABLE): >60 ML/MIN/1.73 M^2
GLUCOSE SERPL-MCNC: 96 MG/DL (ref 70–110)
HCT VFR BLD AUTO: 36.9 % (ref 37–48.5)
HGB BLD-MCNC: 12.5 G/DL (ref 12–16)
IMM GRANULOCYTES # BLD AUTO: 0.03 K/UL (ref 0–0.04)
IMM GRANULOCYTES NFR BLD AUTO: 0.3 % (ref 0–0.5)
LYMPHOCYTES # BLD AUTO: 1.3 K/UL (ref 1–4.8)
LYMPHOCYTES NFR BLD: 12.1 % (ref 18–48)
MAGNESIUM SERPL-MCNC: 2.1 MG/DL (ref 1.6–2.6)
MCH RBC QN AUTO: 37 PG (ref 27–31)
MCHC RBC AUTO-ENTMCNC: 33.9 G/DL (ref 32–36)
MCV RBC AUTO: 109 FL (ref 82–98)
MONOCYTES # BLD AUTO: 1 K/UL (ref 0.3–1)
MONOCYTES NFR BLD: 8.9 % (ref 4–15)
NEUTROPHILS # BLD AUTO: 8.7 K/UL (ref 1.8–7.7)
NEUTROPHILS NFR BLD: 78.2 % (ref 38–73)
NRBC BLD-RTO: 0 /100 WBC
PHOSPHATE SERPL-MCNC: 2.9 MG/DL (ref 2.7–4.5)
PLATELET # BLD AUTO: 219 K/UL (ref 150–450)
PMV BLD AUTO: 10.7 FL (ref 9.2–12.9)
POCT GLUCOSE: 108 MG/DL (ref 70–110)
POCT GLUCOSE: 122 MG/DL (ref 70–110)
POCT GLUCOSE: 96 MG/DL (ref 70–110)
POTASSIUM SERPL-SCNC: 3.1 MMOL/L (ref 3.5–5.1)
PROT SERPL-MCNC: 6.3 G/DL (ref 6–8.4)
RBC # BLD AUTO: 3.38 M/UL (ref 4–5.4)
SODIUM SERPL-SCNC: 138 MMOL/L (ref 136–145)
WBC # BLD AUTO: 11.1 K/UL (ref 3.9–12.7)

## 2023-06-24 PROCEDURE — 11000001 HC ACUTE MED/SURG PRIVATE ROOM

## 2023-06-24 PROCEDURE — 83735 ASSAY OF MAGNESIUM: CPT

## 2023-06-24 PROCEDURE — 25000003 PHARM REV CODE 250

## 2023-06-24 PROCEDURE — 25000003 PHARM REV CODE 250: Performed by: INTERNAL MEDICINE

## 2023-06-24 PROCEDURE — 92610 EVALUATE SWALLOWING FUNCTION: CPT

## 2023-06-24 PROCEDURE — 51798 US URINE CAPACITY MEASURE: CPT

## 2023-06-24 PROCEDURE — 94640 AIRWAY INHALATION TREATMENT: CPT

## 2023-06-24 PROCEDURE — 80053 COMPREHEN METABOLIC PANEL: CPT

## 2023-06-24 PROCEDURE — 85025 COMPLETE CBC W/AUTO DIFF WBC: CPT

## 2023-06-24 PROCEDURE — 99233 PR SUBSEQUENT HOSPITAL CARE,LEVL III: ICD-10-PCS | Mod: ,,, | Performed by: PSYCHIATRY & NEUROLOGY

## 2023-06-24 PROCEDURE — 25000003 PHARM REV CODE 250: Performed by: NURSE PRACTITIONER

## 2023-06-24 PROCEDURE — 99233 SBSQ HOSP IP/OBS HIGH 50: CPT | Mod: ,,, | Performed by: PSYCHIATRY & NEUROLOGY

## 2023-06-24 PROCEDURE — 94761 N-INVAS EAR/PLS OXIMETRY MLT: CPT

## 2023-06-24 PROCEDURE — 84100 ASSAY OF PHOSPHORUS: CPT

## 2023-06-24 PROCEDURE — 25000242 PHARM REV CODE 250 ALT 637 W/ HCPCS: Performed by: STUDENT IN AN ORGANIZED HEALTH CARE EDUCATION/TRAINING PROGRAM

## 2023-06-24 PROCEDURE — 63600175 PHARM REV CODE 636 W HCPCS: Performed by: PHYSICIAN ASSISTANT

## 2023-06-24 PROCEDURE — 25000003 PHARM REV CODE 250: Performed by: PHYSICIAN ASSISTANT

## 2023-06-24 PROCEDURE — 63600175 PHARM REV CODE 636 W HCPCS: Performed by: NURSE PRACTITIONER

## 2023-06-24 PROCEDURE — 36415 COLL VENOUS BLD VENIPUNCTURE: CPT

## 2023-06-24 PROCEDURE — 97535 SELF CARE MNGMENT TRAINING: CPT

## 2023-06-24 RX ORDER — FOLIC ACID 1 MG/1
1 TABLET ORAL DAILY
Status: DISCONTINUED | OUTPATIENT
Start: 2023-06-24 | End: 2023-06-28 | Stop reason: HOSPADM

## 2023-06-24 RX ORDER — THIAMINE HCL 100 MG
100 TABLET ORAL DAILY
Status: DISCONTINUED | OUTPATIENT
Start: 2023-06-24 | End: 2023-06-28 | Stop reason: HOSPADM

## 2023-06-24 RX ORDER — FUROSEMIDE 10 MG/ML
40 INJECTION INTRAMUSCULAR; INTRAVENOUS
Status: DISCONTINUED | OUTPATIENT
Start: 2023-06-24 | End: 2023-06-27

## 2023-06-24 RX ADMIN — APIXABAN 5 MG: 5 TABLET, FILM COATED ORAL at 09:06

## 2023-06-24 RX ADMIN — METOPROLOL SUCCINATE 12.5 MG: 25 TABLET, EXTENDED RELEASE ORAL at 09:06

## 2023-06-24 RX ADMIN — SENNOSIDES AND DOCUSATE SODIUM 1 TABLET: 50; 8.6 TABLET ORAL at 09:06

## 2023-06-24 RX ADMIN — MUPIROCIN: 20 OINTMENT TOPICAL at 08:06

## 2023-06-24 RX ADMIN — POLYETHYLENE GLYCOL 3350 17 G: 17 POWDER, FOR SOLUTION ORAL at 09:06

## 2023-06-24 RX ADMIN — IPRATROPIUM BROMIDE AND ALBUTEROL SULFATE 3 ML: 2.5; .5 SOLUTION RESPIRATORY (INHALATION) at 12:06

## 2023-06-24 RX ADMIN — CEFTRIAXONE 1 G: 1 INJECTION, POWDER, FOR SOLUTION INTRAMUSCULAR; INTRAVENOUS at 12:06

## 2023-06-24 RX ADMIN — IPRATROPIUM BROMIDE AND ALBUTEROL SULFATE 3 ML: 2.5; .5 SOLUTION RESPIRATORY (INHALATION) at 04:06

## 2023-06-24 RX ADMIN — Medication 6 MG: at 08:06

## 2023-06-24 RX ADMIN — LEVETIRACETAM 500 MG: 500 TABLET, FILM COATED ORAL at 08:06

## 2023-06-24 RX ADMIN — FUROSEMIDE 40 MG: 10 INJECTION, SOLUTION INTRAMUSCULAR; INTRAVENOUS at 08:06

## 2023-06-24 RX ADMIN — SENNOSIDES AND DOCUSATE SODIUM 1 TABLET: 50; 8.6 TABLET ORAL at 08:06

## 2023-06-24 RX ADMIN — FUROSEMIDE 40 MG: 10 INJECTION, SOLUTION INTRAMUSCULAR; INTRAVENOUS at 09:06

## 2023-06-24 RX ADMIN — FOLIC ACID 1 MG: 1 TABLET ORAL at 11:06

## 2023-06-24 RX ADMIN — IPRATROPIUM BROMIDE AND ALBUTEROL SULFATE 3 ML: 2.5; .5 SOLUTION RESPIRATORY (INHALATION) at 06:06

## 2023-06-24 RX ADMIN — THERA TABS 1 TABLET: TAB at 11:06

## 2023-06-24 RX ADMIN — APIXABAN 5 MG: 5 TABLET, FILM COATED ORAL at 08:06

## 2023-06-24 RX ADMIN — TIOTROPIUM BROMIDE INHALATION SPRAY 2 PUFF: 3.12 SPRAY, METERED RESPIRATORY (INHALATION) at 06:06

## 2023-06-24 RX ADMIN — Medication 100 MG: at 11:06

## 2023-06-24 RX ADMIN — LEVETIRACETAM 500 MG: 500 TABLET, FILM COATED ORAL at 09:06

## 2023-06-24 RX ADMIN — IPRATROPIUM BROMIDE AND ALBUTEROL SULFATE 3 ML: 2.5; .5 SOLUTION RESPIRATORY (INHALATION) at 10:06

## 2023-06-24 RX ADMIN — ATORVASTATIN CALCIUM 40 MG: 40 TABLET, FILM COATED ORAL at 09:06

## 2023-06-24 NOTE — ASSESSMENT & PLAN NOTE
-Stroke risk factor.  Patient self reports drinking 1-2 beers daily.  Last drink 1 month ago.  -Encourage continued cessation.

## 2023-06-24 NOTE — ASSESSMENT & PLAN NOTE
History of, on albuterol PRN at home per chart  Pulmonology curbsided , appreciate recs:  --scheduled duonebs q4h while awake x 4 doses  --budesonide stopped, started spiriva 2 puffs daily (continue at dc)  --consider prednisone 40mg for 5 days unless any contraindications from cardiology or neuro perspectives  --refer to pulmonology for outpatient follow up as she will need PFTs for further evaluation of her suspected obstructive lung disease

## 2023-06-24 NOTE — ASSESSMENT & PLAN NOTE
Cardiology consulted on arrival and initially admitted to CICU for further workup of trop elevation with acute EF drop and apical ballooning on bedside echo    Recs as per 6/22 progress note:  WMA consistent with typical Takotsubo cardiomyopathy  [facility] with nonobstructive disease  - Supportive care  - Beta blockade when able, resting HR currently low  - Elevated LVEDP on [facility]  - Lasix 40mg IV BID

## 2023-06-24 NOTE — SUBJECTIVE & OBJECTIVE
Neurologic Chief Complaint: R MCA stroke w/hemorrhagic conversion    Subjective:     Interval History: Patient is seen for follow-up neurological assessment and treatment recommendations: Low urine output overnight.  30 cc on bladder scan.  Patient has EF 18% on last TTE.  Cardiology saw patient on 6/22 s/p [facility] and recommended Lasix 40 mg BID, will start.  Patient restless overnight.  She has a self-reported history of drinking 1-2 beers a day.  Last drink 1 month ago.    HPI, Past Medical, Family, and Social History remains the same as documented in the initial encounter.     Review of Systems   Constitutional:  Negative for chills, fatigue and fever.   HENT:  Negative for drooling and trouble swallowing.    Eyes:  Negative for photophobia, pain, discharge and visual disturbance.   Respiratory:  Positive for shortness of breath. Negative for cough, chest tightness and wheezing.    Cardiovascular:  Negative for chest pain, palpitations and leg swelling.   Gastrointestinal:  Negative for abdominal pain, constipation, diarrhea, nausea and vomiting.   Endocrine: Negative for cold intolerance and heat intolerance.   Genitourinary:  Negative for dysuria, frequency, hematuria and urgency.   Musculoskeletal:  Negative for neck pain and neck stiffness.   Skin:  Negative for rash and wound.   Allergic/Immunologic: Negative for environmental allergies and food allergies.   Neurological:  Negative for dizziness, tremors, speech difficulty, weakness, light-headedness, numbness and headaches.   Hematological:  Negative for adenopathy. Does not bruise/bleed easily.   Psychiatric/Behavioral:  Negative for agitation, confusion and hallucinations.    Scheduled Meds:   albuterol-ipratropium  3 mL Nebulization Q4H WAKE    apixaban  5 mg Oral BID    atorvastatin  40 mg Oral Daily    cefTRIAXone (ROCEPHIN) IVPB  1 g Intravenous Q24H    folic acid  1 mg Oral Daily    furosemide (LASIX) injection  40 mg Intravenous Q12H    levETIRAcetam   500 mg Oral BID    melatonin  6 mg Oral Nightly    metoprolol succinate  12.5 mg Oral Daily    multivitamin  1 tablet Oral Daily    mupirocin   Nasal BID    polyethylene glycol  17 g Oral Daily    senna-docusate 8.6-50 mg  1 tablet Oral BID    thiamine  100 mg Oral Daily    tiotropium bromide  2 puff Inhalation Daily     Continuous Infusions:  PRN Meds:acetaminophen, albuterol sulfate, dextrose 10%, dextrose 10%, dextrose, dextrose, glucagon (human recombinant), naloxone, nicotine, ondansetron, sodium chloride 0.9%, sodium chloride 0.9%    Objective:     Vital Signs (Most Recent):  Temp: 97.7 °F (36.5 °C) (06/24/23 1534)  Pulse: 63 (06/24/23 1614)  Resp: 16 (06/24/23 1614)  BP: 117/65 (06/24/23 1534)  SpO2: 96 % (06/24/23 1614)  BP Location: Right arm    Vital Signs Range (Last 24H):  Temp:  [97.5 °F (36.4 °C)-99 °F (37.2 °C)]   Pulse:  [57-72]   Resp:  [15-20]   BP: (107-121)/(59-70)   SpO2:  [93 %-98 %]   BP Location: Right arm       Physical Exam  Vitals and nursing note reviewed.   Constitutional:       General: She is not in acute distress.     Appearance: She is well-developed. She is not diaphoretic.   HENT:      Head: Normocephalic and atraumatic.      Right Ear: External ear normal.      Left Ear: External ear normal.      Nose: Nose normal.   Eyes:      General: No scleral icterus.        Right eye: No discharge.         Left eye: No discharge.      Conjunctiva/sclera: Conjunctivae normal.   Cardiovascular:      Rate and Rhythm: Normal rate and regular rhythm.   Pulmonary:      Effort: Pulmonary effort is normal. No respiratory distress.      Breath sounds: Wheezing present.   Abdominal:      General: Bowel sounds are normal. There is no distension.      Palpations: Abdomen is soft.      Tenderness: There is no abdominal tenderness.   Musculoskeletal:      Cervical back: Normal range of motion and neck supple.      Right lower leg: No edema.      Left lower leg: No edema.   Skin:     General: Skin is warm  and dry.      Capillary Refill: Capillary refill takes less than 2 seconds.   Neurological:      General: No focal deficit present.      Mental Status: She is alert and oriented to person, place, and time.   Psychiatric:         Mood and Affect: Mood normal.            Neurological Exam:   LOC: alert  Attention Span: Good   Articulation: No dysarthria  Orientation: Person, Place, Time   Facial Movement (CN VII): Symmetric facial expression      Laboratory:  CMP:   Recent Labs   Lab 06/24/23  0622   CALCIUM 8.1*   ALBUMIN 3.0*   PROT 6.3      K 3.1*   CO2 26      BUN 13   CREATININE 0.6   ALKPHOS 44*   ALT 31   AST 45*   BILITOT 0.7     CBC:   Recent Labs   Lab 06/24/23  0622   WBC 11.10   RBC 3.38*   HGB 12.5   HCT 36.9*      *   MCH 37.0*   MCHC 33.9     Coagulation:   Recent Labs   Lab 06/21/23  1721   INR 1.0   APTT 22.1       Diagnostic Results     Brain Imaging   MRI brain W WO contrast 6/22/2023  Moderate sized right frontal hemorrhagic infarction, similar to prior CT.  No underlying mass lesions appreciated. Follow up examination as clinically indicated.      CTH without contrast 6/21/2023  Hyperdense mass right posterior frontal region could represent a hematoma.  This is probably most likely.  A brain neoplasm is thought to be less likely.  For further evaluation MRI of the brain without with contrast is recommended.        Vessel Imaging   CTA head/neck 6/23/2023  Right frontal hemorrhagic infarct again identified with mild surrounding edema and localized mass effect.  No midline shift.   CTA shows no high-grade stenosis or large vessel occlusion.  No vascular malformation underlying the right the frontal hemorrhage.   Incidental note is made of a 4 mm right ICA aneurysm.  Recommend follow-up to ensure long-term stability.  This can likely be followed with MRA.   Centrilobular emphysematous changes at the lung apices.      Cardiac Imaging   TTE 6/22/2023  The left ventricle is  normal in size with mild eccentric hypertrophy and severely decreased systolic function.  The estimated ejection fraction is 18%.  There are segmental left ventricular wall motion abnormalities.  Grade I left ventricular diastolic dysfunction.  Normal right ventricular size with normal right ventricular systolic function.  Elevated central venous pressure (15 mmHg).  Trivial posterior pericardial effusion. Just at the base.  Compared with 10/27/2020, marked worsening has occured.

## 2023-06-24 NOTE — PROCEDURES
Routine EEG Report    Svetlana Beck  [id number]  1950    DATE OF SERVICE: 6/23/2023  REASON FOR CONSULT:  72-year-old woman with a right anterior quadrant infarct with decreased responsiveness.  Evaluate for evidence of epileptiform activity.    METHODOLOGY   Electroencephalographic (EEG) recording is with electrodes placed according to the International 10-20 placement system.  Thirty two (32) channels of digital signal (sampling rate of 512/sec) including T1 and T2 was simultaneously recorded from the scalp and may include  EKG, EMG, and/or eye monitors.  Recording band pass was 0.1 to 512 hz.  Digital video recording of the patient is simultaneously recorded with the EEG.  The patient is instructed report clinical symptoms which may occur during the recording session.  EEG and video recording is stored and archived in digital format. Activation procedures which include photic stimulation, hyperventilation and instructing patients to perform simple task are done in selected patients.    The EEG is displayed on a monitor screen and can be reviewed using different montages.  Computer assisted analysis is employed to detect spike and electrographic seizure activity.   The entire record is submitted for computer analysis.  The entire recording is visually reviewed and the times identified by computer analysis as being spikes or seizures are reviewed again.  Compresses spectral analysis (CSA) is also performed on the activity recorded from each individual channel.  This is displayed as a power display of frequencies from 0 to 30 Hz over time.   The CSA is reviewed looking for asymmetries in power between homologous areas of the scalp and then compared with the original EEG recording.     [employer] software is also utilized in the review of this study.  This software suite analyzes the EEG recording in multiple domains.  Coherence and rhythmicity is computed to identify EEG sections which may contain organized  seizures.  Each channel undergoes analysis to detect presence of spike and sharp waves which have special and morphological characteristic of epileptic activity.  The routine EEG recording is converted from spacial into frequency domain.  This is then displayed comparing homologous areas to identify areas of significant asymmetry.  Algorithm to identify non-cortically generated artifact is used to separate eye movement, EMG and other artifact from the EEG.      EEG FINDINGS  Background activity:   The waking background is continuous and asymmetric mixed frequency activity.  Over the left, there is moderate voltage theta/alpha activity.  Over the right, there is higher amplitude more disorganized theta/delta slowing.  There is plenty of intermittent polymorphic slowing seen bilaterally, right>left.    Sleep:  Sleep is not captured during this recording session.    Activation procedures:   The patient is able to follow simple commands and answers orientation questions correctly.     Cardiac Monitor:   Occasional irregular beats    Impression:   This is an abnormal routine EEG because of focal slowing seen over the right hemisphere with evidence of subcortical/deep midline dysfunction bilaterally, right> left.  There are no epileptiform discharges and no electrographic seizures.    Sadie Garland MD PhD [facility]  Neurology-Epilepsy  [facility].

## 2023-06-24 NOTE — PROGRESS NOTES
[facility] - Neurosurgery ([facility])  Vascular Neurology  Comprehensive Stroke Center  Progress Note    Assessment/Plan:     * Embolic stroke involving right middle cerebral artery  72 year old woman transferred from OSH for imaging evaluation by Neurosurgery due to concern for mass vs hematoma complicated by persistently elevated troponin on arrival requiring admission to CCU for diagnostic left heart cath. Further imaging notable for right frontal infarction with hemorrhagic transformation; likely acute in timeline. NIHSS 4; likely pseudo elevated as patient examined immediately following procedure. No focal deficits noted. Remained in CCU overnight 6/22 and was transferred to  primary service on 6/23am. MRI brain W WO contrast with moderate sized R frontal infarct with hemorrhagic conversion and without evidence of underlying mass. CTA completed overnight which showed stable hemorrhagic infarct with no LVO/critical stenosis and noted incidental 4mm R ICA aneurysm.   Suspect hemorrhagic stroke etiology to be cardio-embolic vs large vessel atherosclerotic disease.    Low urine output overnight.  30 cc on bladder scan.  Patient has EF 18% on last TTE.  Cardiology saw patient on 6/22 s/p [facility] on the same date and recommended Lasix 40 mg BID at that time.  Patient restless overnight.  She has a self-reported history of drinking 1-2 beers a day, last drink one month ago.        Antithrombotics for secondary stroke prevention: Eliquis 5mg BID started 6/23, continue    Statins for secondary stroke prevention and HLD, if present: Statins: Atorvastatin- 40 mg daily    Aggressive risk factor modification: Smoking, Diet, Exercise, CAD     Rehab efforts: The patient has been evaluated by a stroke team provider and the therapy needs have been fully considered based off the presenting complaints and exam findings. The following therapy evaluations are needed: PT evaluate and treat, OT evaluate and treat, SLP evaluate and treat  dispo recs for IPR, SLP eval pending    Diagnostics ordered/pending: None     VTE prophylaxis: Mechanical prophylaxis: Place SCDs  None: Reason for No Pharmacological VTE Prophylaxis: Currently on anticoagulation    BP parameters:  SBP <160    Alcohol abuse, daily use  -Stroke risk factor.  Patient self reports drinking 1-2 beers daily.  Last drink 1 month ago.  -Encourage continued cessation.      Bacteremia due to Gram-negative bacteria  Bcx with GNR, PCR with e coli  Cefepime switched to ceftriaxone 1g daily, end date 6/30    Takotsubo cardiomyopathy  Cardiology consulted on arrival and initially admitted to CICU for further workup of trop elevation with acute EF drop and apical ballooning on bedside echo    Recs as per 6/22 progress note:  WMA consistent with typical Takotsubo cardiomyopathy  [facility] with nonobstructive disease  - Supportive care  - Beta blockade when able, resting HR currently low  - Elevated LVEDP on LH  - Lasix 40mg IV BID    Smoker  Stroke risk factor  Continue to encourage cessation when the patient is able to engage  Nicotine patch PRN    Chronic obstructive pulmonary disease  History of, on albuterol PRN at home per chart  Pulmonology curbsided , appreciate recs:  --scheduled duonebs q4h while awake x 4 doses  --budesonide stopped, started spiriva 2 puffs daily (continue at dc)  --consider prednisone 40mg for 5 days unless any contraindications from cardiology or neuro perspectives  --refer to pulmonology for outpatient follow up as she will need PFTs for further evaluation of her suspected obstructive lung disease         06/23/2023 NAEO, neuro exam stable and nonfocal. NIHSS 0. CTA overnight with stable hemorrhagic conversion and no LVO/high grade stenosis. Eliquis started for stroke prevention due to EF 18%. Cefepime switched to ceftriaxone for e coli bacteremia. Pulmonology curbsided for recs due to history of COPD, appreciate assistance. SLP consulted for swallow eval. Dispo recs for  IPR, placement pending.  6/24/2023 Low urine output overnight.  30 cc on bladder scan.  Patient has EF 18% on last TTE.  Cardiology saw patient on 6/22 s/p [facility] and recommended Lasix 40 mg BID, will start.  Patient restless overnight.  She has a self-reported history of drinking 1-2 beers a day.  Last drink 1 month ago.      STROKE DOCUMENTATION   Acute Stroke Times   Unknown Normal Date: Unknown Date  Unknown Normal Time: Unknown Time  Unknown Symptom Onset Date: Unknown Date  Unknown Symptom Onset Time: Unknown Time  Thrombolytic Therapy Recommended: No  Thrombectomy Recommended: No    NIH Scale:  1a. Level of Consciousness: 0-->Alert, keenly responsive  1b. LOC Questions: 0-->Answers both questions correctly  1c. LOC Commands: 0-->Performs both tasks correctly  2. Best Gaze: 0-->Normal  3. Visual: 0-->No visual loss  4. Facial Palsy: 0-->Normal symmetrical movements  5a. Motor Arm, Left: 0-->No drift, limb holds 90 (or 45) degrees for full 10 secs  5b. Motor Arm, Right: 0-->No drift, limb holds 90 (or 45) degrees for full 10 secs  6a. Motor Leg, Left: 0-->No drift, leg holds 30 degree position for full 5 secs  6b. Motor Leg, Right: 0-->No drift, leg holds 30 degree position for full 5 secs  7. Limb Ataxia: 0-->Absent  8. Sensory: 0-->Normal, no sensory loss  9. Best Language: 0-->No aphasia, normal  10. Dysarthria: 0-->Normal  11. Extinction and Inattention (formerly Neglect): 0-->No abnormality  Total (NIH Stroke Scale): 0       Modified [place] Coma Scale:15   ABCD2 Score:    [id number] Score:   HAS -BLED Score:   ICH Score:   Hunt & Mckeon Classification:      Hemorrhagic change of an Ischemic Stroke: Does this patient have an ischemic stroke with hemorrhagic changes? Yes, Grading Scale: PH Type 1 (PH-1) = hematoma in < 30% of the infarcted area with some slight space-occupying effect. Is this a symptomatic change?  No - Hemorrhage is not clinically significant     Neurologic Chief Complaint: R MCA  stroke w/hemorrhagic conversion    Subjective:     Interval History: Patient is seen for follow-up neurological assessment and treatment recommendations: Low urine output overnight.  30 cc on bladder scan.  Patient has EF 18% on last TTE.  Cardiology saw patient on 6/22 s/p [facility] and recommended Lasix 40 mg BID, will start.  Patient restless overnight.  She has a self-reported history of drinking 1-2 beers a day.  Last drink 1 month ago.    HPI, Past Medical, Family, and Social History remains the same as documented in the initial encounter.     Review of Systems   Constitutional:  Negative for chills, fatigue and fever.   HENT:  Negative for drooling and trouble swallowing.    Eyes:  Negative for photophobia, pain, discharge and visual disturbance.   Respiratory:  Positive for shortness of breath. Negative for cough, chest tightness and wheezing.    Cardiovascular:  Negative for chest pain, palpitations and leg swelling.   Gastrointestinal:  Negative for abdominal pain, constipation, diarrhea, nausea and vomiting.   Endocrine: Negative for cold intolerance and heat intolerance.   Genitourinary:  Negative for dysuria, frequency, hematuria and urgency.   Musculoskeletal:  Negative for neck pain and neck stiffness.   Skin:  Negative for rash and wound.   Allergic/Immunologic: Negative for environmental allergies and food allergies.   Neurological:  Negative for dizziness, tremors, speech difficulty, weakness, light-headedness, numbness and headaches.   Hematological:  Negative for adenopathy. Does not bruise/bleed easily.   Psychiatric/Behavioral:  Negative for agitation, confusion and hallucinations.    Scheduled Meds:   albuterol-ipratropium  3 mL Nebulization Q4H WAKE    apixaban  5 mg Oral BID    atorvastatin  40 mg Oral Daily    cefTRIAXone (ROCEPHIN) IVPB  1 g Intravenous Q24H    folic acid  1 mg Oral Daily    furosemide (LASIX) injection  40 mg Intravenous Q12H    levETIRAcetam  500 mg Oral BID     melatonin  6 mg Oral Nightly    metoprolol succinate  12.5 mg Oral Daily    multivitamin  1 tablet Oral Daily    mupirocin   Nasal BID    polyethylene glycol  17 g Oral Daily    senna-docusate 8.6-50 mg  1 tablet Oral BID    thiamine  100 mg Oral Daily    tiotropium bromide  2 puff Inhalation Daily     Continuous Infusions:  PRN Meds:acetaminophen, albuterol sulfate, dextrose 10%, dextrose 10%, dextrose, dextrose, glucagon (human recombinant), naloxone, nicotine, ondansetron, sodium chloride 0.9%, sodium chloride 0.9%    Objective:     Vital Signs (Most Recent):  Temp: 97.7 °F (36.5 °C) (06/24/23 1534)  Pulse: 63 (06/24/23 1614)  Resp: 16 (06/24/23 1614)  BP: 117/65 (06/24/23 1534)  SpO2: 96 % (06/24/23 1614)  BP Location: Right arm    Vital Signs Range (Last 24H):  Temp:  [97.5 °F (36.4 °C)-99 °F (37.2 °C)]   Pulse:  [57-72]   Resp:  [15-20]   BP: (107-121)/(59-70)   SpO2:  [93 %-98 %]   BP Location: Right arm       Physical Exam  Vitals and nursing note reviewed.   Constitutional:       General: She is not in acute distress.     Appearance: She is well-developed. She is not diaphoretic.   HENT:      Head: Normocephalic and atraumatic.      Right Ear: External ear normal.      Left Ear: External ear normal.      Nose: Nose normal.   Eyes:      General: No scleral icterus.        Right eye: No discharge.         Left eye: No discharge.      Conjunctiva/sclera: Conjunctivae normal.   Cardiovascular:      Rate and Rhythm: Normal rate and regular rhythm.   Pulmonary:      Effort: Pulmonary effort is normal. No respiratory distress.      Breath sounds: Wheezing present.   Abdominal:      General: Bowel sounds are normal. There is no distension.      Palpations: Abdomen is soft.      Tenderness: There is no abdominal tenderness.   Musculoskeletal:      Cervical back: Normal range of motion and neck supple.      Right lower leg: No edema.      Left lower leg: No edema.   Skin:     General: Skin is warm and dry.       Capillary Refill: Capillary refill takes less than 2 seconds.   Neurological:      General: No focal deficit present.      Mental Status: She is alert and oriented to person, place, and time.   Psychiatric:         Mood and Affect: Mood normal.            Neurological Exam:   LOC: alert  Attention Span: Good   Articulation: No dysarthria  Orientation: Person, Place, Time   Facial Movement (CN VII): Symmetric facial expression      Laboratory:  CMP:   Recent Labs   Lab 06/24/23  0622   CALCIUM 8.1*   ALBUMIN 3.0*   PROT 6.3      K 3.1*   CO2 26      BUN 13   CREATININE 0.6   ALKPHOS 44*   ALT 31   AST 45*   BILITOT 0.7     CBC:   Recent Labs   Lab 06/24/23  0622   WBC 11.10   RBC 3.38*   HGB 12.5   HCT 36.9*      *   MCH 37.0*   MCHC 33.9     Coagulation:   Recent Labs   Lab 06/21/23  1721   INR 1.0   APTT 22.1       Diagnostic Results     Brain Imaging   MRI brain W WO contrast 6/22/2023  Moderate sized right frontal hemorrhagic infarction, similar to prior CT.  No underlying mass lesions appreciated. Follow up examination as clinically indicated.      CTH without contrast 6/21/2023  Hyperdense mass right posterior frontal region could represent a hematoma.  This is probably most likely.  A brain neoplasm is thought to be less likely.  For further evaluation MRI of the brain without with contrast is recommended.        Vessel Imaging   CTA head/neck 6/23/2023  Right frontal hemorrhagic infarct again identified with mild surrounding edema and localized mass effect.  No midline shift.   CTA shows no high-grade stenosis or large vessel occlusion.  No vascular malformation underlying the right the frontal hemorrhage.   Incidental note is made of a 4 mm right ICA aneurysm.  Recommend follow-up to ensure long-term stability.  This can likely be followed with MRA.   Centrilobular emphysematous changes at the lung apices.      Cardiac Imaging   TTE 6/22/2023   The left ventricle is normal in size  with mild eccentric hypertrophy and severely decreased systolic function.   The estimated ejection fraction is 18%.   There are segmental left ventricular wall motion abnormalities.   Grade I left ventricular diastolic dysfunction.   Normal right ventricular size with normal right ventricular systolic function.   Elevated central venous pressure (15 mmHg).   Trivial posterior pericardial effusion. Just at the base.   Compared with 10/27/2020, marked worsening has occured.           Jolie Figueroa, BEA, NP  Comprehensive Stroke Center  Department of Vascular Neurology   [facility] Neurosurgery [facility])

## 2023-06-24 NOTE — PLAN OF CARE
NPU Care Plan    POC reviewed with Svetlana Beck at 0300. Pt verbalized understanding. Questions and concerns addressed. No acute events overnight. Pt progressing toward goals. Will continue to monitor. See below and flowsheets for full assessment and VS info.    -Pt remains mildly confused   -actively trying to take off restraints and pull IV's out/get out of bed   -turner discontinued        Is this a stroke patient? yes    Neuro:  [place] Coma Scale  Best Eye Response: 4-->(E4) spontaneous  Best Motor Response: 6-->(M6) obeys commands  Best Verbal Response: 4-->(V4) confused  [place] Coma Scale Score: 14  Assessment Qualifiers: patient not sedated/intubated  Pupil PERRLA: yes     24hr Temp:  [97 °F (36.1 °C)-99 °F (37.2 °C)]     CV:   Rhythm: normal sinus rhythm  BP goals:   SBP < 160  MAP > 65    Resp:           Plan: N/A    GI/:     Diet/Nutrition Received: regular  Last Bowel Movement: 06/23/23  Voiding Characteristics: due to void    Intake/Output Summary (Last 24 hours) at 6/24/2023 0515  Last data filed at 6/24/2023 0015  Gross per 24 hour   Intake 457.3 ml   Output 550 ml   Net -92.7 ml     Unmeasured Output  Stool Occurrence: 1    Labs/Accuchecks:  Recent Labs   Lab 06/23/23  0359   WBC 13.06*   RBC 3.50*   HGB 13.0   HCT 37.9         Recent Labs   Lab 06/23/23  0359      K 3.5   CO2 26      BUN 19   CREATININE 0.7   ALKPHOS 40*   ALT 26   AST 64*   BILITOT 0.5      Recent Labs   Lab 06/21/23  1721   INR 1.0   APTT 22.1      Recent Labs   Lab 06/21/23  1239 06/21/23  1519 06/22/23  0906   CPK 3441*  --   --    TROPONINI 3.363*   < > 2.974*    < > = values in this interval not displayed.       Electrolytes: Electrolytes replaced  Accuchecks: ACHS    Gtts:      LDA/Wounds:  Lines/Drains/Airways       Peripheral Intravenous Line  Duration                  Peripheral IV - Single Lumen 06/23/23 1215 20 G Anterior;Left Forearm <1 day                  Wounds: No  Wound care consulted: No

## 2023-06-24 NOTE — PLAN OF CARE
Problem: Fall Injury Risk  Goal: Absence of Fall and Fall-Related Injury  Outcome: Ongoing, Progressing     Problem: Restraint, Nonbehavioral (Nonviolent)  Goal: Absence of Harm or Injury  Outcome: Ongoing, Progressing     Problem: Adult Inpatient Plan of Care  Goal: Plan of Care Review  Outcome: Ongoing, Progressing  Goal: Patient-Specific Goal (Individualized)  Outcome: Ongoing, Progressing  Goal: Absence of Hospital-Acquired Illness or Injury  Outcome: Ongoing, Progressing  Goal: Optimal Comfort and Wellbeing  Outcome: Ongoing, Progressing  Goal: Readiness for Transition of Care  Outcome: Ongoing, Progressing     Problem: Infection  Goal: Absence of Infection Signs and Symptoms  Outcome: Ongoing, Progressing     Problem: Skin Injury Risk Increased  Goal: Skin Health and Integrity  Outcome: Ongoing, Progressing     Problem: Adjustment to Illness (Delirium)  Goal: Optimal Coping  Outcome: Ongoing, Progressing     Problem: Altered Behavior (Delirium)  Goal: Improved Behavioral Control  Outcome: Ongoing, Progressing     Problem: Attention and Thought Clarity Impairment (Delirium)  Goal: Improved Attention and Thought Clarity  Outcome: Ongoing, Progressing     Problem: Sleep Disturbance (Delirium)  Goal: Improved Sleep  Outcome: Ongoing, Progressing     Problem: Cerebral Tissue Perfusion (Stroke, Ischemic/Transient Ischemic Attack)  Goal: Optimal Cerebral Tissue Perfusion  Outcome: Ongoing, Progressing     Problem: Cognitive Impairment (Stroke, Ischemic/Transient Ischemic Attack)  Goal: Optimal Cognitive Function  Outcome: Ongoing, Progressing

## 2023-06-24 NOTE — ASSESSMENT & PLAN NOTE
72 year old woman transferred from OSH for imaging evaluation by Neurosurgery due to concern for mass vs hematoma complicated by persistently elevated troponin on arrival requiring admission to CCU for diagnostic left heart cath. Further imaging notable for right frontal infarction with hemorrhagic transformation; likely acute in timeline. NIHSS 4; likely pseudo elevated as patient examined immediately following procedure. No focal deficits noted. Remained in CCU overnight 6/22 and was transferred to  primary service on 6/23am. MRI brain W WO contrast with moderate sized R frontal infarct with hemorrhagic conversion and without evidence of underlying mass. CTA completed overnight which showed stable hemorrhagic infarct with no LVO/critical stenosis and noted incidental 4mm R ICA aneurysm.   Suspect hemorrhagic stroke etiology to be cardio-embolic vs large vessel atherosclerotic disease.    Low urine output overnight.  30 cc on bladder scan.  Patient has EF 18% on last TTE.  Cardiology saw patient on 6/22 s/p [facility] on the same date and recommended Lasix 40 mg BID at that time.  Patient restless overnight.  She has a self-reported history of drinking 1-2 beers a day, last drink one month ago.        Antithrombotics for secondary stroke prevention: Eliquis 5mg BID started 6/23, continue    Statins for secondary stroke prevention and HLD, if present: Statins: Atorvastatin- 40 mg daily    Aggressive risk factor modification: Smoking, Diet, Exercise, CAD     Rehab efforts: The patient has been evaluated by a stroke team provider and the therapy needs have been fully considered based off the presenting complaints and exam findings. The following therapy evaluations are needed: PT evaluate and treat, OT evaluate and treat, SLP evaluate and treat dispo recs for IPR, SLP eval pending    Diagnostics ordered/pending: None     VTE prophylaxis: Mechanical prophylaxis: Place SCDs  None: Reason for No Pharmacological VTE  Prophylaxis: Currently on anticoagulation    BP parameters:  SBP <160

## 2023-06-24 NOTE — ASSESSMENT & PLAN NOTE
Stroke risk factor  Continue to encourage cessation when the patient is able to engage  Nicotine patch PRN

## 2023-06-24 NOTE — PT/OT/SLP EVAL
Speech Language Pathology Evaluation  Bedside Swallow    Patient Name:  Svetlana Beck   MRN:  [id number]  Admitting Diagnosis: Embolic stroke involving right middle cerebral artery    Recommendations:                 General Recommendations:   monitor diet tolerance  Diet recommendations:  Dental Soft, Thin   Aspiration Precautions: 1 bite/sip at a time, Alternating bites/sips, Assistance with meals, Avoid talking while eating, Feed only when awake/alert, Frequent oral care, HOB to 90 degrees, Meds whole buried in puree, Meds whole 1 at a time, Monitor for s/s of aspiration, Small bites/sips, and Strict aspiration precautions   General Precautions: Standard, aspiration, fall  Communication strategies:  go to room if call light pushed    Assessment:     Svetlana Beck is a 72 y.o. female with an SLP diagnosis of Dysphagia.      History:     Past Medical History:   Diagnosis Date    Asthma     COPD (chronic obstructive pulmonary disease)        Past Surgical History:   Procedure Laterality Date    ANGIOGRAM, CORONARY, WITH LEFT HEART CATHETERIZATION Left 6/22/2023    Procedure: Angiogram, Coronary, with Left Heart Cath;  Surgeon: Gab Caldwell MD;  Location: [facility] CATH LAB;  Service: Cardiology;  Laterality: Left;     HPI:  This is a 72 years old female with known history of COPD, active smoking, positive nuclear stress test in 2020 with lost to follow-up.    Patient initially presented in the emergency department in [place] after she was found down on the floor at home.  She is disoriented at the time of my encounter so history was obtained from her sister at bedside and also her roommate at home over the phone.  Patient is independent at baseline and was doing fine until day before yesterday when she went out for some shopping.  Yesterday she remained inside her room and did not come out but her roommate.  This morning somebody went to check on her and found her on the floor.  She was brought to the emergency  "department where her troponin was 3 and EKG showed left bundle-branch block with sinus rhythm which was not new.  CT head showed right posterior frontal region mass versus hematoma and patient was subsequently transferred to [facility].  MRI was done here, final report is pending however per discussion with emergency doctor physician who talked to neuro, impression is most likely this is neoplasm without any vasogenic edema.  Patient currently is oriented to self and says she is in a bar.  She denies any chest pain or shortness of breath at this time and appears to be comfortable.  Her blood pressure and heart rate are also within normal limits.  Bedside echocardiogram done by me shows acute drop in EF 15-20% with apical ballooning and CVP 8 mm Hg by echo.  Her lactic acid is 2.7.  For new onset encephalopathy, discussed with Neuro about possibly starting steroids however steroids were not recommended as no vasogenic edema is present.  Patient will be moved to cardiac ICU for further evaluation and management.        Prior Intubation HX:  none during this admission    Modified Barium Swallow: none on file    Chest X-Rays: 6/22/23: No acute process.    Prior diet: currently on a cardiac diet; pt reports at home needing to eat tender meats and cut up into small bite sizes    Subjective     "Pills get stuck in my throat sometimes."     Pain/Comfort:  Pain Rating 1: 0/10    Respiratory Status: Room air    Objective:     Oral Musculature Evaluation  Oral Musculature: WFL  Dentition: edentulous  Secretion Management: adequate  Mucosal Quality: adequate  Mandibular Strength and Mobility: WFL  Oral Labial Strength and Mobility: WFL  Lingual Strength and Mobility: WFL  Velar Elevation: WFL  Buccal Strength and Mobility: WFL  Volitional Cough: strong  Volitional Swallow: elicited  Voice Prior to PO Intake: dry, clear    Bedside Swallow Eval:   Consistencies Assessed:  Thin liquids cup sip x 1, straw sips x 3  Puree " 1/2 tsp x 1, full tsp x 1  Solids 1/4 cracker x 2      Oral Phase:   Prolonged mastication for solid  Mild Lingual residue from eggs consumed prior to bedside swallow evaluation    Pharyngeal Phase:   no overt clinical signs/symptoms of aspiration  no overt clinical signs/symptoms of pharyngeal dysphagia    Compensatory Strategies  Liquid wash    Treatment: Education was provided to pt regarding role of SLP, purpose of swallowing assessment, recommendations for dental soft diet/thin liquids, recommendations to administer medication buried whole in puree to ease comfort when swallowing, aspiration precautions, and plan to follow up to monitor diet tolerance.  Pt demonstrated understanding of education provided, but will benefit from continued reinforcement.     Goals:   Multidisciplinary Problems       SLP Goals          Problem: SLP    Goal Priority Disciplines Outcome   SLP Goal     SLP    Description: Speech Language Pathology Goals  Goals expected to be met by 7/1:  1. Pt will tolerate least restrictive diet and thin liquids without s/s of aspiration.                                Plan:     Patient to be seen:  3 x/week   Plan of Care expires:  07/23/23  Plan of Care reviewed with:  patient   SLP Follow-Up:  Yes       Discharge recommendations:   (tbd)     Time Tracking:     SLP Treatment Date:   06/24/23  Speech Start Time:  0940  Speech Stop Time:  0956     Speech Total Time (min):  16 min    Billable Minutes:  Eval Swallow and Oral Function 8 and Self Care/Home Management Training 8    06/24/2023

## 2023-06-24 NOTE — PROGRESS NOTES
Respiratory nebulizer treatment completed. No adverse reactions noted. MDI delivered successfully  Pt has a good breath hold

## 2023-06-25 LAB
ALBUMIN SERPL BCP-MCNC: 3.2 G/DL (ref 3.5–5.2)
ALP SERPL-CCNC: 53 U/L (ref 55–135)
ALT SERPL W/O P-5'-P-CCNC: 41 U/L (ref 10–44)
ANION GAP SERPL CALC-SCNC: 13 MMOL/L (ref 8–16)
AST SERPL-CCNC: 53 U/L (ref 10–40)
BASOPHILS # BLD AUTO: 0.01 K/UL (ref 0–0.2)
BASOPHILS NFR BLD: 0.1 % (ref 0–1.9)
BILIRUB SERPL-MCNC: 0.8 MG/DL (ref 0.1–1)
BUN SERPL-MCNC: 13 MG/DL (ref 8–23)
CALCIUM SERPL-MCNC: 9.2 MG/DL (ref 8.7–10.5)
CHLORIDE SERPL-SCNC: 98 MMOL/L (ref 95–110)
CO2 SERPL-SCNC: 28 MMOL/L (ref 23–29)
CREAT SERPL-MCNC: 0.7 MG/DL (ref 0.5–1.4)
DIFFERENTIAL METHOD: ABNORMAL
EOSINOPHIL # BLD AUTO: 0.1 K/UL (ref 0–0.5)
EOSINOPHIL NFR BLD: 0.7 % (ref 0–8)
ERYTHROCYTE [DISTWIDTH] IN BLOOD BY AUTOMATED COUNT: 11.4 % (ref 11.5–14.5)
EST. GFR  (NO RACE VARIABLE): >60 ML/MIN/1.73 M^2
GLUCOSE SERPL-MCNC: 109 MG/DL (ref 70–110)
HCT VFR BLD AUTO: 38.6 % (ref 37–48.5)
HGB BLD-MCNC: 13.4 G/DL (ref 12–16)
IMM GRANULOCYTES # BLD AUTO: 0.03 K/UL (ref 0–0.04)
IMM GRANULOCYTES NFR BLD AUTO: 0.3 % (ref 0–0.5)
LYMPHOCYTES # BLD AUTO: 1.3 K/UL (ref 1–4.8)
LYMPHOCYTES NFR BLD: 11.9 % (ref 18–48)
MAGNESIUM SERPL-MCNC: 2.1 MG/DL (ref 1.6–2.6)
MCH RBC QN AUTO: 37.3 PG (ref 27–31)
MCHC RBC AUTO-ENTMCNC: 34.7 G/DL (ref 32–36)
MCV RBC AUTO: 108 FL (ref 82–98)
MONOCYTES # BLD AUTO: 0.8 K/UL (ref 0.3–1)
MONOCYTES NFR BLD: 8 % (ref 4–15)
NEUTROPHILS # BLD AUTO: 8.3 K/UL (ref 1.8–7.7)
NEUTROPHILS NFR BLD: 79 % (ref 38–73)
NRBC BLD-RTO: 0 /100 WBC
PHOSPHATE SERPL-MCNC: 3.2 MG/DL (ref 2.7–4.5)
PLATELET # BLD AUTO: 259 K/UL (ref 150–450)
PMV BLD AUTO: 10.7 FL (ref 9.2–12.9)
POCT GLUCOSE: 117 MG/DL (ref 70–110)
POCT GLUCOSE: 127 MG/DL (ref 70–110)
POTASSIUM SERPL-SCNC: 3 MMOL/L (ref 3.5–5.1)
PROT SERPL-MCNC: 7.2 G/DL (ref 6–8.4)
RBC # BLD AUTO: 3.59 M/UL (ref 4–5.4)
SODIUM SERPL-SCNC: 139 MMOL/L (ref 136–145)
WBC # BLD AUTO: 10.5 K/UL (ref 3.9–12.7)

## 2023-06-25 PROCEDURE — 85025 COMPLETE CBC W/AUTO DIFF WBC: CPT | Performed by: PHYSICIAN ASSISTANT

## 2023-06-25 PROCEDURE — 25000003 PHARM REV CODE 250: Performed by: INTERNAL MEDICINE

## 2023-06-25 PROCEDURE — 36415 COLL VENOUS BLD VENIPUNCTURE: CPT | Performed by: PHYSICIAN ASSISTANT

## 2023-06-25 PROCEDURE — 94645 CONT INHLJ TX EACH ADDL HOUR: CPT

## 2023-06-25 PROCEDURE — 93010 EKG 12-LEAD: ICD-10-PCS | Mod: ,,, | Performed by: INTERNAL MEDICINE

## 2023-06-25 PROCEDURE — 84100 ASSAY OF PHOSPHORUS: CPT | Performed by: PHYSICIAN ASSISTANT

## 2023-06-25 PROCEDURE — 80053 COMPREHEN METABOLIC PANEL: CPT | Performed by: PHYSICIAN ASSISTANT

## 2023-06-25 PROCEDURE — 94640 AIRWAY INHALATION TREATMENT: CPT

## 2023-06-25 PROCEDURE — 25000003 PHARM REV CODE 250: Performed by: NURSE PRACTITIONER

## 2023-06-25 PROCEDURE — 93010 ELECTROCARDIOGRAM REPORT: CPT | Mod: ,,, | Performed by: INTERNAL MEDICINE

## 2023-06-25 PROCEDURE — 25000003 PHARM REV CODE 250

## 2023-06-25 PROCEDURE — 11000001 HC ACUTE MED/SURG PRIVATE ROOM

## 2023-06-25 PROCEDURE — 25000003 PHARM REV CODE 250: Performed by: PHYSICIAN ASSISTANT

## 2023-06-25 PROCEDURE — 93005 ELECTROCARDIOGRAM TRACING: CPT

## 2023-06-25 PROCEDURE — 63600175 PHARM REV CODE 636 W HCPCS: Performed by: NURSE PRACTITIONER

## 2023-06-25 PROCEDURE — 99233 SBSQ HOSP IP/OBS HIGH 50: CPT | Mod: ,,, | Performed by: PSYCHIATRY & NEUROLOGY

## 2023-06-25 PROCEDURE — 94761 N-INVAS EAR/PLS OXIMETRY MLT: CPT

## 2023-06-25 PROCEDURE — 99233 PR SUBSEQUENT HOSPITAL CARE,LEVL III: ICD-10-PCS | Mod: ,,, | Performed by: PSYCHIATRY & NEUROLOGY

## 2023-06-25 PROCEDURE — 63600175 PHARM REV CODE 636 W HCPCS: Performed by: PHYSICIAN ASSISTANT

## 2023-06-25 PROCEDURE — 83735 ASSAY OF MAGNESIUM: CPT | Performed by: PHYSICIAN ASSISTANT

## 2023-06-25 PROCEDURE — 25000242 PHARM REV CODE 250 ALT 637 W/ HCPCS: Performed by: STUDENT IN AN ORGANIZED HEALTH CARE EDUCATION/TRAINING PROGRAM

## 2023-06-25 RX ORDER — OLANZAPINE 2.5 MG/1
5 TABLET ORAL EVERY 4 HOURS PRN
Status: COMPLETED | OUTPATIENT
Start: 2023-06-25 | End: 2023-06-25

## 2023-06-25 RX ORDER — POTASSIUM CHLORIDE 20 MEQ/1
20 TABLET, EXTENDED RELEASE ORAL ONCE
Status: COMPLETED | OUTPATIENT
Start: 2023-06-25 | End: 2023-06-25

## 2023-06-25 RX ORDER — POTASSIUM CHLORIDE 20 MEQ/1
20 TABLET, EXTENDED RELEASE ORAL DAILY
Status: DISCONTINUED | OUTPATIENT
Start: 2023-06-26 | End: 2023-06-27

## 2023-06-25 RX ORDER — BENZONATATE 100 MG/1
100 CAPSULE ORAL 3 TIMES DAILY PRN
Status: DISCONTINUED | OUTPATIENT
Start: 2023-06-25 | End: 2023-06-28 | Stop reason: HOSPADM

## 2023-06-25 RX ADMIN — Medication 6 MG: at 08:06

## 2023-06-25 RX ADMIN — SENNOSIDES AND DOCUSATE SODIUM 1 TABLET: 50; 8.6 TABLET ORAL at 09:06

## 2023-06-25 RX ADMIN — BENZONATATE 100 MG: 100 CAPSULE ORAL at 01:06

## 2023-06-25 RX ADMIN — LEVETIRACETAM 500 MG: 500 TABLET, FILM COATED ORAL at 09:06

## 2023-06-25 RX ADMIN — FUROSEMIDE 40 MG: 10 INJECTION, SOLUTION INTRAMUSCULAR; INTRAVENOUS at 09:06

## 2023-06-25 RX ADMIN — ALBUTEROL SULFATE 2.5 MG: 2.5 SOLUTION RESPIRATORY (INHALATION) at 12:06

## 2023-06-25 RX ADMIN — FUROSEMIDE 40 MG: 10 INJECTION, SOLUTION INTRAMUSCULAR; INTRAVENOUS at 08:06

## 2023-06-25 RX ADMIN — THERA TABS 1 TABLET: TAB at 09:06

## 2023-06-25 RX ADMIN — OLANZAPINE 5 MG: 2.5 TABLET, FILM COATED ORAL at 08:06

## 2023-06-25 RX ADMIN — Medication 100 MG: at 09:06

## 2023-06-25 RX ADMIN — APIXABAN 5 MG: 5 TABLET, FILM COATED ORAL at 09:06

## 2023-06-25 RX ADMIN — POLYETHYLENE GLYCOL 3350 17 G: 17 POWDER, FOR SOLUTION ORAL at 09:06

## 2023-06-25 RX ADMIN — POTASSIUM CHLORIDE 20 MEQ: 1500 TABLET, EXTENDED RELEASE ORAL at 01:06

## 2023-06-25 RX ADMIN — MUPIROCIN: 20 OINTMENT TOPICAL at 08:06

## 2023-06-25 RX ADMIN — MUPIROCIN: 20 OINTMENT TOPICAL at 09:06

## 2023-06-25 RX ADMIN — ATORVASTATIN CALCIUM 40 MG: 40 TABLET, FILM COATED ORAL at 09:06

## 2023-06-25 RX ADMIN — CEFTRIAXONE 1 G: 1 INJECTION, POWDER, FOR SOLUTION INTRAMUSCULAR; INTRAVENOUS at 12:06

## 2023-06-25 RX ADMIN — TIOTROPIUM BROMIDE INHALATION SPRAY 2 PUFF: 3.12 SPRAY, METERED RESPIRATORY (INHALATION) at 09:06

## 2023-06-25 RX ADMIN — OLANZAPINE 5 MG: 2.5 TABLET, FILM COATED ORAL at 05:06

## 2023-06-25 RX ADMIN — METOPROLOL SUCCINATE 12.5 MG: 25 TABLET, EXTENDED RELEASE ORAL at 09:06

## 2023-06-25 RX ADMIN — SENNOSIDES AND DOCUSATE SODIUM 1 TABLET: 50; 8.6 TABLET ORAL at 08:06

## 2023-06-25 RX ADMIN — APIXABAN 5 MG: 5 TABLET, FILM COATED ORAL at 08:06

## 2023-06-25 RX ADMIN — FOLIC ACID 1 MG: 1 TABLET ORAL at 09:06

## 2023-06-25 RX ADMIN — BENZONATATE 100 MG: 100 CAPSULE ORAL at 08:06

## 2023-06-25 RX ADMIN — LEVETIRACETAM 500 MG: 500 TABLET, FILM COATED ORAL at 08:06

## 2023-06-25 NOTE — CONSULTS
Inpatient consult to Physical Medicine Rehab  Consult performed by: Anna Marie Hollingsworth NP  Consult ordered by: ALVERTO Azul  Reason for consult: Assess rehab needs    Reviewed patient history and current admission.  Rehab team following.  Full consult to follow.    LIA Judd, FNP-C  Physical Medicine & Rehabilitation   06/25/2023

## 2023-06-25 NOTE — ASSESSMENT & PLAN NOTE
72 year old woman transferred from OSH for imaging evaluation by Neurosurgery due to concern for mass vs hematoma complicated by persistently elevated troponin on arrival requiring admission to CCU for diagnostic left heart cath. Further imaging notable for right frontal infarction with hemorrhagic transformation; likely acute in timeline. NIHSS 4; likely pseudo elevated as patient examined immediately following procedure. No focal deficits noted. Remained in CCU overnight 6/22 and was transferred to  primary service on 6/23am. MRI brain W WO contrast with moderate sized R frontal infarct with hemorrhagic conversion and without evidence of underlying mass. CTA completed overnight which showed stable hemorrhagic infarct with no LVO/critical stenosis and noted incidental 4mm R ICA aneurysm.   Suspect hemorrhagic stroke etiology to be cardio-embolic vs large vessel atherosclerotic disease.    NAEO, neuro exam stable. Restlessness improved today, restraints removed. Reports breathing improved with spiriva, will continue at discharge. Pending IPR placement.       Antithrombotics for secondary stroke prevention: Eliquis 5mg BID    Statins for secondary stroke prevention and HLD, if present: Statins: Atorvastatin- 40 mg daily    Aggressive risk factor modification: Smoking, Diet, Exercise, CAD     Rehab efforts: The patient has been evaluated by a stroke team provider and the therapy needs have been fully considered based off the presenting complaints and exam findings. The following therapy evaluations are needed: PT evaluate and treat, OT evaluate and treat, SLP evaluate and treat dispo recs for IPR, diet recs for dental soft/thin liquids    Diagnostics ordered/pending: None     VTE prophylaxis: Mechanical prophylaxis: Place SCDs  None: Reason for No Pharmacological VTE Prophylaxis: Currently on anticoagulation    BP parameters:  SBP <160

## 2023-06-25 NOTE — SUBJECTIVE & OBJECTIVE
Neurologic Chief Complaint: Hemorrhagic conversion of R MCA infarct    Subjective:     Interval History: Patient is seen for follow-up neurological assessment and treatment recommendations:   NAEO, neuro exam stable. Restlessness improved today, restraints removed. Reports breathing improved with spiriva, will continue at discharge. Pending IPR placement.    HPI, Past Medical, Family, and Social History remains the same as documented in the initial encounter.     Review of Systems   Constitutional:  Negative for fatigue.   HENT:  Negative for drooling and trouble swallowing.    Eyes:  Negative for visual disturbance.   Respiratory:  Positive for shortness of breath (improved).    Cardiovascular:  Negative for palpitations.   Gastrointestinal:  Negative for nausea and vomiting.   Musculoskeletal:  Negative for neck stiffness.   Skin:  Negative for color change.   Neurological:  Negative for speech difficulty, weakness and headaches.   Psychiatric/Behavioral:  Negative for confusion.    All other systems reviewed and are negative.  Scheduled Meds:   apixaban  5 mg Oral BID    atorvastatin  40 mg Oral Daily    cefTRIAXone (ROCEPHIN) IVPB  1 g Intravenous Q24H    folic acid  1 mg Oral Daily    furosemide (LASIX) injection  40 mg Intravenous Q12H    levETIRAcetam  500 mg Oral BID    melatonin  6 mg Oral Nightly    metoprolol succinate  12.5 mg Oral Daily    multivitamin  1 tablet Oral Daily    mupirocin   Nasal BID    polyethylene glycol  17 g Oral Daily    senna-docusate 8.6-50 mg  1 tablet Oral BID    thiamine  100 mg Oral Daily    tiotropium bromide  2 puff Inhalation Daily     Continuous Infusions:  PRN Meds:acetaminophen, albuterol sulfate, dextrose 10%, dextrose 10%, dextrose, dextrose, glucagon (human recombinant), naloxone, nicotine, ondansetron, sodium chloride 0.9%, sodium chloride 0.9%    Objective:     Vital Signs (Most Recent):  Temp: 98.6 °F (37 °C) (06/25/23 0717)  Pulse: 63 (06/25/23 1028)  Resp: 16  (06/25/23 0921)  BP: 122/65 (06/25/23 0921)  SpO2: 95 % (06/25/23 0921)  BP Location: Left arm    Vital Signs Range (Last 24H):  Temp:  [97.5 °F (36.4 °C)-98.9 °F (37.2 °C)]   Pulse:  [60-85]   Resp:  [16-20]   BP: (107-122)/(59-70)   SpO2:  [94 %-98 %]   BP Location: Left arm       Physical Exam  Vitals and nursing note reviewed.   Constitutional:       General: She is not in acute distress.  HENT:      Head: Normocephalic.      Nose: Nose normal.      Mouth/Throat:      Pharynx: No oropharyngeal exudate or posterior oropharyngeal erythema.   Eyes:      Extraocular Movements: Extraocular movements intact.      Conjunctiva/sclera: Conjunctivae normal.   Cardiovascular:      Rate and Rhythm: Normal rate.   Pulmonary:      Effort: Pulmonary effort is normal. No respiratory distress.   Abdominal:      General: There is no distension.   Musculoskeletal:         General: No deformity or signs of injury.      Cervical back: Normal range of motion. No rigidity.   Skin:     General: Skin is warm and dry.   Neurological:      Mental Status: She is alert and oriented to person, place, and time.      Cranial Nerves: No dysarthria or facial asymmetry.      Sensory: Sensation is intact.      Motor: No weakness, tremor or seizure activity.      Coordination: Coordination is intact.   Psychiatric:         Attention and Perception: Attention normal.         Behavior: Behavior normal. Behavior is cooperative.            Neurological Exam:   LOC: alert  Attention Span: Good   Language: No aphasia  Articulation: No dysarthria  Orientation: Person, Place, Time   Visual Fields: Full  EOM (CN III, IV, VI): Full/intact  Facial Sensation (CN V): Normal  Facial Movement (CN VII): Symmetric facial expression    Motor: LUE Normal 5/5, LLE Normal 5/5, RUE Normal 5/5, RLE Normal 5/5  Sensation: Intact to light touch      Laboratory:  CMP:   Recent Labs   Lab 06/25/23  0858   CALCIUM 9.2   ALBUMIN 3.2*   PROT 7.2      K 3.0*   CO2 28   CL  98   BUN 13   CREATININE 0.7   ALKPHOS 53*   ALT 41   AST 53*   BILITOT 0.8       CBC:   Recent Labs   Lab 06/25/23  0858   WBC 10.50   RBC 3.59*   HGB 13.4   HCT 38.6      *   MCH 37.3*   MCHC 34.7       Lipid Panel:   Recent Labs   Lab 06/23/23  0359   CHOL 128   LDLCALC 65.6   HDL 38*   TRIG 122       Coagulation:   Recent Labs   Lab 06/21/23  1721   INR 1.0   APTT 22.1       Hgb A1C:   Recent Labs   Lab 06/23/23  0359   HGBA1C 4.6       TSH: No results for input(s): TSH in the last 168 hours.    Diagnostic Results     Brain Imaging   MRI brain W WO contrast 6/22/2023  Moderate sized right frontal hemorrhagic infarction, similar to prior CT.  No underlying mass lesions appreciated.  Follow up examination as clinically indicated.     CTH without contrast 6/21/2023  Hyperdense mass right posterior frontal region could represent a hematoma.  This is probably most likely.  A brain neoplasm is thought to be less likely.  For further evaluation MRI of the brain without with contrast is recommended.      Vessel Imaging   CTA head/neck 6/23/2023  Right frontal hemorrhagic infarct again identified with mild surrounding edema and localized mass effect.  No midline shift.   CTA shows no high-grade stenosis or large vessel occlusion.  No vascular malformation underlying the right the frontal hemorrhage.   Incidental note is made of a 4 mm right ICA aneurysm.  Recommend follow-up to ensure long-term stability.  This can likely be followed with MRA.   Centrilobular emphysematous changes at the lung apices.     Cardiac Imaging   TTE 6/22/2023  The left ventricle is normal in size with mild eccentric hypertrophy and severely decreased systolic function.  The estimated ejection fraction is 18%.  There are segmental left ventricular wall motion abnormalities.  Grade I left ventricular diastolic dysfunction.  Normal right ventricular size with normal right ventricular systolic function.  Elevated central venous  pressure (15 mmHg).  Trivial posterior pericardial effusion. Just at the base.  Compared with 10/27/2020, marked worsening has occured.

## 2023-06-25 NOTE — PROGRESS NOTES
[facility] ([facility])  Vascular Neurology  Comprehensive Stroke Center  Progress Note    Assessment/Plan:     * Embolic stroke involving right middle cerebral artery  72 year old woman transferred from OSH for imaging evaluation by Neurosurgery due to concern for mass vs hematoma complicated by persistently elevated troponin on arrival requiring admission to CCU for diagnostic left heart cath. Further imaging notable for right frontal infarction with hemorrhagic transformation; likely acute in timeline. NIHSS 4; likely pseudo elevated as patient examined immediately following procedure. No focal deficits noted. Remained in CCU overnight 6/22 and was transferred to  primary service on 6/23am. MRI brain W WO contrast with moderate sized R frontal infarct with hemorrhagic conversion and without evidence of underlying mass. CTA completed overnight which showed stable hemorrhagic infarct with no LVO/critical stenosis and noted incidental 4mm R ICA aneurysm.   Suspect hemorrhagic stroke etiology to be cardio-embolic vs large vessel atherosclerotic disease.    NAEO, neuro exam stable. Restlessness improved today, restraints removed. Reports breathing improved with spiriva, will continue at discharge. Pending IPR placement.       Antithrombotics for secondary stroke prevention: Eliquis 5mg BID    Statins for secondary stroke prevention and HLD, if present: Statins: Atorvastatin- 40 mg daily    Aggressive risk factor modification: Smoking, Diet, Exercise, CAD     Rehab efforts: The patient has been evaluated by a stroke team provider and the therapy needs have been fully considered based off the presenting complaints and exam findings. The following therapy evaluations are needed: PT evaluate and treat, OT evaluate and treat, SLP evaluate and treat dispo recs for IPR, diet recs for dental soft/thin liquids    Diagnostics ordered/pending: None     VTE prophylaxis: Mechanical prophylaxis: Place SCDs  None:  Reason for No Pharmacological VTE Prophylaxis: Currently on anticoagulation    BP parameters:  SBP <160    Alcohol abuse, daily use  -Stroke risk factor.  Patient self reports drinking 1-2 beers daily.  Last drink 1 month ago.  -Encourage continued cessation.      Bacteremia due to Gram-negative bacteria  Bcx with GNR, PCR with e coli  Cefepime switched to ceftriaxone 1g daily, end date 6/30    Takotsubo cardiomyopathy  Cardiology consulted on arrival and initially admitted to CICU for further workup of trop elevation with acute EF drop and apical ballooning on bedside echo    Recs as per 6/22 progress note:  WMA consistent with typical Takotsubo cardiomyopathy  [facility] with nonobstructive disease  - Supportive care  - Beta blockade when able, resting HR currently low  - Elevated LVEDP on [facility]  - Lasix 40mg IV BID    Smoker  Stroke risk factor  Continue to encourage cessation when the patient is able to engage  Nicotine patch PRN    Chronic obstructive pulmonary disease  History of, on albuterol PRN at home per chart  Pulmonology curbsided , appreciate recs:  --scheduled duonebs q4h while awake x 4 doses  --budesonide stopped, started spiriva 2 puffs daily (continue at dc)  --consider prednisone 40mg for 5 days unless any contraindications from cardiology or neuro perspectives  --refer to pulmonology for outpatient follow up as she will need PFTs for further evaluation of her suspected obstructive lung disease         06/23/2023 NAEO, neuro exam stable and nonfocal. NIHSS 0. CTA overnight with stable hemorrhagic conversion and no LVO/high grade stenosis. Eliquis started for stroke prevention due to EF 18%. Cefepime switched to ceftriaxone for e coli bacteremia. Pulmonology curbsided for recs due to history of COPD, appreciate assistance. SLP consulted for syeda cooney. Dispo recs for IPR, placement pending.  6/24/2023 Low urine output overnight.  30 cc on bladder scan.  Patient has EF 18% on last TTE.  Cardiology saw  patient on 6/22 s/p [facility] and recommended Lasix 40 mg BID, will start.  Patient restless overnight.  She has a self-reported history of drinking 1-2 beers a day.  Last drink 1 month ago.  06/25/2023 NAEO, neuro exam stable. Restlessness improved today, restraints removed. Reports breathing improved with spiriva, will continue at discharge. Pending IPR placement.        STROKE DOCUMENTATION   Acute Stroke Times   Unknown Normal Date: Unknown Date  Unknown Normal Time: Unknown Time  Unknown Symptom Onset Date: Unknown Date  Unknown Symptom Onset Time: Unknown Time  Thrombolytic Therapy Recommended: No  Thrombectomy Recommended: No    NIH Scale:  1a. Level of Consciousness: 0-->Alert, keenly responsive  1b. LOC Questions: 0-->Answers both questions correctly  1c. LOC Commands: 0-->Performs both tasks correctly  2. Best Gaze: 0-->Normal  3. Visual: 0-->No visual loss  4. Facial Palsy: 0-->Normal symmetrical movements  5a. Motor Arm, Left: 0-->No drift, limb holds 90 (or 45) degrees for full 10 secs  5b. Motor Arm, Right: 0-->No drift, limb holds 90 (or 45) degrees for full 10 secs  6a. Motor Leg, Left: 0-->No drift, leg holds 30 degree position for full 5 secs  6b. Motor Leg, Right: 0-->No drift, leg holds 30 degree position for full 5 secs  7. Limb Ataxia: 0-->Absent  8. Sensory: 0-->Normal, no sensory loss  9. Best Language: 0-->No aphasia, normal  10. Dysarthria: 0-->Normal  11. Extinction and Inattention (formerly Neglect): 0-->No abnormality  Total (NIH Stroke Scale): 0       Modified James    Victoria Coma Scale:    ABCD2 Score:    [id number] Score:   HAS -BLED Score:   ICH Score:   Hunt & Mckeon Classification:      Hemorrhagic change of an Ischemic Stroke: Does this patient have an ischemic stroke with hemorrhagic changes? No     Neurologic Chief Complaint: Hemorrhagic conversion of R MCA infarct    Subjective:     Interval History: Patient is seen for follow-up neurological assessment and treatment  recommendations:   NAEO, neuro exam stable. Restlessness improved today, restraints removed. Reports breathing improved with spiriva, will continue at discharge. Pending IPR placement.    HPI, Past Medical, Family, and Social History remains the same as documented in the initial encounter.     Review of Systems   Constitutional:  Negative for fatigue.   HENT:  Negative for drooling and trouble swallowing.    Eyes:  Negative for visual disturbance.   Respiratory:  Positive for shortness of breath (improved).    Cardiovascular:  Negative for palpitations.   Gastrointestinal:  Negative for nausea and vomiting.   Musculoskeletal:  Negative for neck stiffness.   Skin:  Negative for color change.   Neurological:  Negative for speech difficulty, weakness and headaches.   Psychiatric/Behavioral:  Negative for confusion.    All other systems reviewed and are negative.  Scheduled Meds:   apixaban  5 mg Oral BID    atorvastatin  40 mg Oral Daily    cefTRIAXone (ROCEPHIN) IVPB  1 g Intravenous Q24H    folic acid  1 mg Oral Daily    furosemide (LASIX) injection  40 mg Intravenous Q12H    levETIRAcetam  500 mg Oral BID    melatonin  6 mg Oral Nightly    metoprolol succinate  12.5 mg Oral Daily    multivitamin  1 tablet Oral Daily    mupirocin   Nasal BID    polyethylene glycol  17 g Oral Daily    senna-docusate 8.6-50 mg  1 tablet Oral BID    thiamine  100 mg Oral Daily    tiotropium bromide  2 puff Inhalation Daily     Continuous Infusions:  PRN Meds:acetaminophen, albuterol sulfate, dextrose 10%, dextrose 10%, dextrose, dextrose, glucagon (human recombinant), naloxone, nicotine, ondansetron, sodium chloride 0.9%, sodium chloride 0.9%    Objective:     Vital Signs (Most Recent):  Temp: 98.6 °F (37 °C) (06/25/23 0717)  Pulse: 63 (06/25/23 1028)  Resp: 16 (06/25/23 0921)  BP: 122/65 (06/25/23 0921)  SpO2: 95 % (06/25/23 0921)  BP Location: Left arm    Vital Signs Range (Last 24H):  Temp:  [97.5 °F (36.4 °C)-98.9 °F  (37.2 °C)]   Pulse:  [60-85]   Resp:  [16-20]   BP: (107-122)/(59-70)   SpO2:  [94 %-98 %]   BP Location: Left arm       Physical Exam  Vitals and nursing note reviewed.   Constitutional:       General: She is not in acute distress.  HENT:      Head: Normocephalic.      Nose: Nose normal.      Mouth/Throat:      Pharynx: No oropharyngeal exudate or posterior oropharyngeal erythema.   Eyes:      Extraocular Movements: Extraocular movements intact.      Conjunctiva/sclera: Conjunctivae normal.   Cardiovascular:      Rate and Rhythm: Normal rate.   Pulmonary:      Effort: Pulmonary effort is normal. No respiratory distress.   Abdominal:      General: There is no distension.   Musculoskeletal:         General: No deformity or signs of injury.      Cervical back: Normal range of motion. No rigidity.   Skin:     General: Skin is warm and dry.   Neurological:      Mental Status: She is alert and oriented to person, place, and time.      Cranial Nerves: No dysarthria or facial asymmetry.      Sensory: Sensation is intact.      Motor: No weakness, tremor or seizure activity.      Coordination: Coordination is intact.   Psychiatric:         Attention and Perception: Attention normal.         Behavior: Behavior normal. Behavior is cooperative.            Neurological Exam:   LOC: alert  Attention Span: Good   Language: No aphasia  Articulation: No dysarthria  Orientation: Person, Place, Time   Visual Fields: Full  EOM (CN III, IV, VI): Full/intact  Facial Sensation (CN V): Normal  Facial Movement (CN VII): Symmetric facial expression    Motor: LUE Normal 5/5, LLE Normal 5/5, RUE Normal 5/5, RLE Normal 5/5  Sensation: Intact to light touch      Laboratory:  CMP:   Recent Labs   Lab 06/25/23  0858   CALCIUM 9.2   ALBUMIN 3.2*   PROT 7.2      K 3.0*   CO2 28   CL 98   BUN 13   CREATININE 0.7   ALKPHOS 53*   ALT 41   AST 53*   BILITOT 0.8       CBC:   Recent Labs   Lab 06/25/23  0858   WBC 10.50   RBC 3.59*   HGB 13.4   HCT  38.6      *   MCH 37.3*   MCHC 34.7       Lipid Panel:   Recent Labs   Lab 06/23/23  0359   CHOL 128   LDLCALC 65.6   HDL 38*   TRIG 122       Coagulation:   Recent Labs   Lab 06/21/23  1721   INR 1.0   APTT 22.1       Hgb A1C:   Recent Labs   Lab 06/23/23  0359   HGBA1C 4.6       TSH: No results for input(s): TSH in the last 168 hours.    Diagnostic Results     Brain Imaging   MRI brain W WO contrast 6/22/2023  Moderate sized right frontal hemorrhagic infarction, similar to prior CT.  No underlying mass lesions appreciated.  Follow up examination as clinically indicated.     CTH without contrast 6/21/2023  Hyperdense mass right posterior frontal region could represent a hematoma.  This is probably most likely.  A brain neoplasm is thought to be less likely.  For further evaluation MRI of the brain without with contrast is recommended.      Vessel Imaging   CTA head/neck 6/23/2023  Right frontal hemorrhagic infarct again identified with mild surrounding edema and localized mass effect.  No midline shift.   CTA shows no high-grade stenosis or large vessel occlusion.  No vascular malformation underlying the right the frontal hemorrhage.   Incidental note is made of a 4 mm right ICA aneurysm.  Recommend follow-up to ensure long-term stability.  This can likely be followed with MRA.   Centrilobular emphysematous changes at the lung apices.     Cardiac Imaging   TTE 6/22/2023   The left ventricle is normal in size with mild eccentric hypertrophy and severely decreased systolic function.   The estimated ejection fraction is 18%.   There are segmental left ventricular wall motion abnormalities.   Grade I left ventricular diastolic dysfunction.   Normal right ventricular size with normal right ventricular systolic function.   Elevated central venous pressure (15 mmHg).   Trivial posterior pericardial effusion. Just at the base.   Compared with 10/27/2020, marked worsening has occured.      Hui PARKER  ALVERT[facility] Preciado  Comprehensive Stroke Center  Department of Vascular Neurology   [facility] - Neuro[facility])

## 2023-06-26 LAB
ALBUMIN SERPL BCP-MCNC: 3.4 G/DL (ref 3.5–5.2)
ALP SERPL-CCNC: 73 U/L (ref 55–135)
ALT SERPL W/O P-5'-P-CCNC: 89 U/L (ref 10–44)
ANION GAP SERPL CALC-SCNC: 15 MMOL/L (ref 8–16)
AST SERPL-CCNC: 97 U/L (ref 10–40)
BASOPHILS # BLD AUTO: 0.04 K/UL (ref 0–0.2)
BASOPHILS NFR BLD: 0.4 % (ref 0–1.9)
BILIRUB SERPL-MCNC: 0.7 MG/DL (ref 0.1–1)
BUN SERPL-MCNC: 13 MG/DL (ref 8–23)
CALCIUM SERPL-MCNC: 9.2 MG/DL (ref 8.7–10.5)
CHLORIDE SERPL-SCNC: 97 MMOL/L (ref 95–110)
CO2 SERPL-SCNC: 26 MMOL/L (ref 23–29)
CREAT SERPL-MCNC: 0.7 MG/DL (ref 0.5–1.4)
DIFFERENTIAL METHOD: ABNORMAL
EOSINOPHIL # BLD AUTO: 0.2 K/UL (ref 0–0.5)
EOSINOPHIL NFR BLD: 1.6 % (ref 0–8)
ERYTHROCYTE [DISTWIDTH] IN BLOOD BY AUTOMATED COUNT: 11.4 % (ref 11.5–14.5)
EST. GFR  (NO RACE VARIABLE): >60 ML/MIN/1.73 M^2
GLUCOSE SERPL-MCNC: 86 MG/DL (ref 70–110)
HCT VFR BLD AUTO: 45.6 % (ref 37–48.5)
HGB BLD-MCNC: 15.5 G/DL (ref 12–16)
IMM GRANULOCYTES # BLD AUTO: 0.04 K/UL (ref 0–0.04)
IMM GRANULOCYTES NFR BLD AUTO: 0.4 % (ref 0–0.5)
LYMPHOCYTES # BLD AUTO: 1.7 K/UL (ref 1–4.8)
LYMPHOCYTES NFR BLD: 17 % (ref 18–48)
MAGNESIUM SERPL-MCNC: 2.2 MG/DL (ref 1.6–2.6)
MCH RBC QN AUTO: 37 PG (ref 27–31)
MCHC RBC AUTO-ENTMCNC: 34 G/DL (ref 32–36)
MCV RBC AUTO: 109 FL (ref 82–98)
MONOCYTES # BLD AUTO: 1.3 K/UL (ref 0.3–1)
MONOCYTES NFR BLD: 12.6 % (ref 4–15)
NEUTROPHILS # BLD AUTO: 6.9 K/UL (ref 1.8–7.7)
NEUTROPHILS NFR BLD: 68 % (ref 38–73)
NRBC BLD-RTO: 0 /100 WBC
PHOSPHATE SERPL-MCNC: 3.3 MG/DL (ref 2.7–4.5)
PLATELET # BLD AUTO: 253 K/UL (ref 150–450)
PMV BLD AUTO: 10.4 FL (ref 9.2–12.9)
POCT GLUCOSE: 100 MG/DL (ref 70–110)
POCT GLUCOSE: 110 MG/DL (ref 70–110)
POCT GLUCOSE: 96 MG/DL (ref 70–110)
POTASSIUM SERPL-SCNC: 3.4 MMOL/L (ref 3.5–5.1)
PROT SERPL-MCNC: 7.9 G/DL (ref 6–8.4)
RBC # BLD AUTO: 4.19 M/UL (ref 4–5.4)
SODIUM SERPL-SCNC: 138 MMOL/L (ref 136–145)
WBC # BLD AUTO: 10.11 K/UL (ref 3.9–12.7)

## 2023-06-26 PROCEDURE — 99233 PR SUBSEQUENT HOSPITAL CARE,LEVL III: ICD-10-PCS | Mod: ,,, | Performed by: PSYCHIATRY & NEUROLOGY

## 2023-06-26 PROCEDURE — 94761 N-INVAS EAR/PLS OXIMETRY MLT: CPT

## 2023-06-26 PROCEDURE — 27000221 HC OXYGEN, UP TO 24 HOURS

## 2023-06-26 PROCEDURE — 94640 AIRWAY INHALATION TREATMENT: CPT

## 2023-06-26 PROCEDURE — 84100 ASSAY OF PHOSPHORUS: CPT | Performed by: PHYSICIAN ASSISTANT

## 2023-06-26 PROCEDURE — 92526 ORAL FUNCTION THERAPY: CPT

## 2023-06-26 PROCEDURE — 97116 GAIT TRAINING THERAPY: CPT | Mod: CQ

## 2023-06-26 PROCEDURE — 25000003 PHARM REV CODE 250: Performed by: NURSE PRACTITIONER

## 2023-06-26 PROCEDURE — 25000242 PHARM REV CODE 250 ALT 637 W/ HCPCS: Performed by: PHYSICIAN ASSISTANT

## 2023-06-26 PROCEDURE — 25000242 PHARM REV CODE 250 ALT 637 W/ HCPCS: Performed by: STUDENT IN AN ORGANIZED HEALTH CARE EDUCATION/TRAINING PROGRAM

## 2023-06-26 PROCEDURE — 36415 COLL VENOUS BLD VENIPUNCTURE: CPT | Performed by: PHYSICIAN ASSISTANT

## 2023-06-26 PROCEDURE — 99233 SBSQ HOSP IP/OBS HIGH 50: CPT | Mod: ,,, | Performed by: PSYCHIATRY & NEUROLOGY

## 2023-06-26 PROCEDURE — 83735 ASSAY OF MAGNESIUM: CPT | Performed by: PHYSICIAN ASSISTANT

## 2023-06-26 PROCEDURE — 25000003 PHARM REV CODE 250: Performed by: PHYSICIAN ASSISTANT

## 2023-06-26 PROCEDURE — 99222 1ST HOSP IP/OBS MODERATE 55: CPT | Mod: ,,, | Performed by: NURSE PRACTITIONER

## 2023-06-26 PROCEDURE — 25000003 PHARM REV CODE 250

## 2023-06-26 PROCEDURE — 63600175 PHARM REV CODE 636 W HCPCS: Performed by: PHYSICIAN ASSISTANT

## 2023-06-26 PROCEDURE — 11000001 HC ACUTE MED/SURG PRIVATE ROOM

## 2023-06-26 PROCEDURE — 99900035 HC TECH TIME PER 15 MIN (STAT)

## 2023-06-26 PROCEDURE — 25000003 PHARM REV CODE 250: Performed by: INTERNAL MEDICINE

## 2023-06-26 PROCEDURE — 99222 PR INITIAL HOSPITAL CARE,LEVL II: ICD-10-PCS | Mod: ,,, | Performed by: NURSE PRACTITIONER

## 2023-06-26 PROCEDURE — 80053 COMPREHEN METABOLIC PANEL: CPT | Performed by: PHYSICIAN ASSISTANT

## 2023-06-26 PROCEDURE — 85025 COMPLETE CBC W/AUTO DIFF WBC: CPT | Performed by: PHYSICIAN ASSISTANT

## 2023-06-26 PROCEDURE — 63600175 PHARM REV CODE 636 W HCPCS: Performed by: NURSE PRACTITIONER

## 2023-06-26 RX ADMIN — LEVETIRACETAM 500 MG: 500 TABLET, FILM COATED ORAL at 08:06

## 2023-06-26 RX ADMIN — TIOTROPIUM BROMIDE INHALATION SPRAY 2 PUFF: 3.12 SPRAY, METERED RESPIRATORY (INHALATION) at 09:06

## 2023-06-26 RX ADMIN — ALBUTEROL SULFATE 2.5 MG: 2.5 SOLUTION RESPIRATORY (INHALATION) at 10:06

## 2023-06-26 RX ADMIN — Medication 100 MG: at 09:06

## 2023-06-26 RX ADMIN — FUROSEMIDE 40 MG: 10 INJECTION, SOLUTION INTRAMUSCULAR; INTRAVENOUS at 08:06

## 2023-06-26 RX ADMIN — SENNOSIDES AND DOCUSATE SODIUM 1 TABLET: 50; 8.6 TABLET ORAL at 08:06

## 2023-06-26 RX ADMIN — ATORVASTATIN CALCIUM 40 MG: 40 TABLET, FILM COATED ORAL at 09:06

## 2023-06-26 RX ADMIN — THERA TABS 1 TABLET: TAB at 09:06

## 2023-06-26 RX ADMIN — RACEPINEPHRINE HYDROCHLORIDE 0.5 ML: 11.25 SOLUTION RESPIRATORY (INHALATION) at 11:06

## 2023-06-26 RX ADMIN — FOLIC ACID 1 MG: 1 TABLET ORAL at 09:06

## 2023-06-26 RX ADMIN — POTASSIUM CHLORIDE 20 MEQ: 1500 TABLET, EXTENDED RELEASE ORAL at 09:06

## 2023-06-26 RX ADMIN — METOPROLOL SUCCINATE 12.5 MG: 25 TABLET, EXTENDED RELEASE ORAL at 09:06

## 2023-06-26 RX ADMIN — SENNOSIDES AND DOCUSATE SODIUM 1 TABLET: 50; 8.6 TABLET ORAL at 09:06

## 2023-06-26 RX ADMIN — LEVETIRACETAM 500 MG: 500 TABLET, FILM COATED ORAL at 09:06

## 2023-06-26 RX ADMIN — MUPIROCIN: 20 OINTMENT TOPICAL at 08:06

## 2023-06-26 RX ADMIN — Medication 6 MG: at 08:06

## 2023-06-26 RX ADMIN — CEFTRIAXONE 1 G: 1 INJECTION, POWDER, FOR SOLUTION INTRAMUSCULAR; INTRAVENOUS at 12:06

## 2023-06-26 RX ADMIN — POLYETHYLENE GLYCOL 3350 17 G: 17 POWDER, FOR SOLUTION ORAL at 09:06

## 2023-06-26 RX ADMIN — FUROSEMIDE 40 MG: 10 INJECTION, SOLUTION INTRAMUSCULAR; INTRAVENOUS at 09:06

## 2023-06-26 RX ADMIN — MUPIROCIN: 20 OINTMENT TOPICAL at 09:06

## 2023-06-26 RX ADMIN — APIXABAN 5 MG: 5 TABLET, FILM COATED ORAL at 09:06

## 2023-06-26 RX ADMIN — APIXABAN 5 MG: 5 TABLET, FILM COATED ORAL at 08:06

## 2023-06-26 NOTE — ASSESSMENT & PLAN NOTE
-S/P LHC per cardiology.  -Supportive management as per cardiology.  -Currently on IV Lasix. Will need transitioning to PO Lasix prior to IPR.

## 2023-06-26 NOTE — ASSESSMENT & PLAN NOTE
72 year old woman transferred from OSH for imaging evaluation by Neurosurgery due to concern for mass vs hematoma complicated by persistently elevated troponin on arrival requiring admission to CCU for diagnostic left heart cath. Further imaging notable for right frontal infarction with hemorrhagic transformation; likely acute in timeline. NIHSS 4; likely pseudo elevated as patient examined immediately following procedure. No focal deficits noted. Remained in CCU overnight 6/22 and was transferred to  primary service on 6/23am. MRI brain W WO contrast with moderate sized R frontal infarct with hemorrhagic conversion and without evidence of underlying mass. CTA completed overnight which showed stable hemorrhagic infarct with no LVO/critical stenosis and noted incidental 4mm R ICA aneurysm.   Suspect hemorrhagic stroke etiology to be cardio-embolic vs large vessel atherosclerotic disease.    NAEO. This morning was more lethargic and difficult to arouse but still able to move all extremities without any obvious focal deficits, repeat CTH stable. Also notified by RN of concern for aspiration when patient was eating breakfast. Respiratory called to bedside for breathing treatment due to coughing, PRN racepinepherine ordered for audible wheezing and concern for stridor. Awaiting IPR placement, and patient must remain out of restraints x24hrs.      Antithrombotics for secondary stroke prevention: Eliquis 5mg BID    Statins for secondary stroke prevention and HLD, if present: Statins: Atorvastatin- 40 mg daily    Aggressive risk factor modification: Smoking, Diet, Exercise, CAD     Rehab efforts: The patient has been evaluated by a stroke team provider and the therapy needs have been fully considered based off the presenting complaints and exam findings. The following therapy evaluations are needed: PT evaluate and treat, OT evaluate and treat, SLP evaluate and treat dispo recs for IPR, diet recs for dental soft/thin  liquids    Diagnostics ordered/pending: None     VTE prophylaxis: Mechanical prophylaxis: Place SCDs  None: Reason for No Pharmacological VTE Prophylaxis: Currently on anticoagulation    BP parameters:  SBP <160

## 2023-06-26 NOTE — CONSULTS
[facility] ([facility])  Physical Medicine & Rehab  Consult Note    Patient Name: Svetlana Beck  MRN: [id number]  Admission Date: 6/21/2023  Hospital Length of Stay: 5 days  Attending Physician: Maycol Raines MD  Consults  Subjective:     Principal Problem: Embolic stroke involving right middle cerebral artery    HPI: Per chart review, 72 year old woman transferred from OSH for imaging evaluation by Neurosurgery due to concern for mass vs hematoma complicated by persistently elevated troponin on arrival requiring admission to CCU for diagnostic left heart cath. Further imaging notable for right frontal infarction with hemorrhagic transformation; likely acute in timeline. NIHSS 4; likely pseudo elevated as patient examined immediately following procedure. No focal deficits noted. Remained in CCU overnight 6/22 and was transferred to  primary service on 6/23am. MRI brain W WO contrast with moderate sized R frontal infarct with hemorrhagic conversion and without evidence of underlying mass. CTA completed overnight which showed stable hemorrhagic infarct with no LVO/critical stenosis and noted incidental 4mm R ICA aneurysm. Suspect hemorrhagic stroke etiology to be cardio-embolic vs large vessel atherosclerotic disease. Pt S/P LHC per cardiology with non-obstructive disease. Cardiology recommends supportive management. PM &R was consulted to evaluate pt for IPR placement.         Functional History: Patient lives  with friend in a single  story home with 6 steps to enter.  Prior to admission, Pt was I with ADLs and mobility.  DME: None.        Hospital Course: Per chart review,    PT- 06/22    Functional Mobility:     Bed Mobility:  Supine > Sit with minimum assistance  Sit > Supine with moderate assistance  Scooting/bridging with moderate assistance      Transfers:   Sit <> Stand Transfer: Minimal Assistance x 1 trials from EOB with  no AD, HHA provided              Gait:  Pt performed trial of 3-4 side  steps along bedside to L with min A of 2, bilateral HHA.    OT- 06/22    Bed Mobility:    Patient completed Scooting/Bridging with moderate assistance  Patient completed Supine to Sit with minimum assistance  Patient completed Sit to Supine with moderate assistance  Pt tolerated sitting EOB for ~7 minutes with CGA-SBA     Functional Mobility/Transfers:  Patient completed Sit <> Stand Transfer with minimum assistance  with  no assistive device and hand-held assist   Functional Mobility: Pt performed 3-4 side steps to the left from EOB with minimum (A) x2 people and HHA.    Activities of Daily Living:  Grooming: stand by assistance to wash face sitting EOB  Toileting: dependence to void via catheter      Past Medical History:   Diagnosis Date    Asthma     COPD (chronic obstructive pulmonary disease)      Past Surgical History:   Procedure Laterality Date    ANGIOGRAM, CORONARY, WITH LEFT HEART CATHETERIZATION Left 6/22/2023    Procedure: Angiogram, Coronary, with Left Heart Cath;  Surgeon: Gab Caldwell MD;  Location: [facility] CATH LAB;  Service: Cardiology;  Laterality: Left;     Review of patient's allergies indicates:   Allergen Reactions    Penicillins Other (See Comments)       Scheduled Medications:    apixaban  5 mg Oral BID    atorvastatin  40 mg Oral Daily    cefTRIAXone (ROCEPHIN) IVPB  1 g Intravenous Q24H    folic acid  1 mg Oral Daily    furosemide (LASIX) injection  40 mg Intravenous Q12H    levETIRAcetam  500 mg Oral BID    melatonin  6 mg Oral Nightly    metoprolol succinate  12.5 mg Oral Daily    multivitamin  1 tablet Oral Daily    mupirocin   Nasal BID    polyethylene glycol  17 g Oral Daily    potassium chloride  20 mEq Oral Daily    senna-docusate 8.6-50 mg  1 tablet Oral BID    thiamine  100 mg Oral Daily    tiotropium bromide  2 puff Inhalation Daily       PRN Medications: acetaminophen, albuterol sulfate, benzonatate, dextrose 10%, dextrose 10%, dextrose, dextrose, glucagon (human  recombinant), naloxone, nicotine, ondansetron, sodium chloride 0.9%, sodium chloride 0.9%    Family History    None       Tobacco Use    Smoking status: Every Day    Smokeless tobacco: Never   Substance and Sexual Activity    Alcohol use: Yes    Drug use: Not on file    Sexual activity: Not on file     Review of Systems   Constitutional:  Positive for activity change.   Eyes: Negative.    Respiratory: Negative.     Cardiovascular:  Negative for chest pain and leg swelling.   Gastrointestinal: Negative.    Genitourinary: Negative.    Musculoskeletal:  Positive for gait problem.   Neurological:  Positive for weakness.   Psychiatric/Behavioral:  Positive for behavioral problems and confusion.         BUE restraints in place.    Objective:     Vital Signs (Most Recent):  Temp: 98.1 °F (36.7 °C) (06/26/23 0702)  Pulse: 62 (06/26/23 0928)  Resp: 18 (06/26/23 0922)  BP: 106/62 (06/26/23 0928)  SpO2: 95 % (06/26/23 0922)    Vital Signs (24h Range):  Temp:  [97.3 °F (36.3 °C)-98.7 °F (37.1 °C)] 98.1 °F (36.7 °C)  Pulse:  [55-72] 62  Resp:  [15-18] 18  SpO2:  [95 %-99 %] 95 %  BP: (106-128)/(60-86) 106/62     Body mass index is 26.11 kg/m².     Physical Exam  Vitals and nursing note reviewed.   Constitutional:       General: She is awake.      Appearance: Normal appearance.   HENT:      Head: Normocephalic and atraumatic.   Eyes:      Extraocular Movements: Extraocular movements intact.   Pulmonary:      Effort: Pulmonary effort is normal. No respiratory distress.   Abdominal:      General: There is no distension.      Palpations: Abdomen is soft.   Musculoskeletal:      Cervical back: Normal range of motion and neck supple.   Skin:     General: Skin is warm and dry.      Capillary Refill: Capillary refill takes 2 to 3 seconds.   Neurological:      General: No focal deficit present.      Mental Status: She is alert and oriented to person, place, and time.      GCS: GCS eye subscore is 4. GCS verbal subscore is 5. GCS motor  subscore is 6.      Motor: Weakness present.      Gait: Gait abnormal.   Psychiatric:         Mood and Affect: Mood normal. Affect is flat.         Speech: Speech is delayed.         Behavior: Behavior is slowed. Behavior is cooperative.        NEUROLOGICAL EXAMINATION:     MENTAL STATUS   Oriented to person, place, and time.     Diagnostic Results: Labs: Reviewed    Assessment/Plan:     * Embolic stroke involving right middle cerebral artery  -MRI brain W WO contrast with moderate sized R frontal infarct with hemorrhagic conversion and without evidence of underlying mass.   -CTA completed overnight which showed stable hemorrhagic infarct with no LVO/critical stenosis and noted incidental 4mm R ICA aneurysm.   -TTE- EF of 18%.  -Cardiology had seen pt and had performed LHC. Per Card, Non-obstructive disease, Pt to continue supportive management.   -PT/OT rec for IPR. Pt amenable.     Alcohol abuse, daily use  -Does not appear to be in Alcohol withdrawal on exam.     Bacteremia due to Gram-negative bacteria  -Completing Rocephin, ending 06/30.    Encephalopathy  -Improving. However, requiring restraints, camera sitter due to impulsiveness.      Takotsubo cardiomyopathy  -S/P LHC per cardiology.  -Supportive management as per cardiology.  -Currently on IV Lasix. Will need transitioning to PO Lasix prior to IPR.     Smoker  -Smoking cessation education needed.     Chronic obstructive pulmonary disease  -Stable.      PM&R Recommendation:     At this time, the PM&R team has reviewed this patient's ongoing medical case including inpatient diagnosis, medical history, clinical examination, labs, vitals, current social and functional history to provide the post-acute recommendation as follows:     RECOMMENDATIONS: Inpatient rehabilitation due to fair to good potential for improvement with therapies and need of physician oversight for management of ongoing active medical issues, once medically stable. Will need to be free  from restraints and camera sitter removal for at least 24 hours prior to rehab admission.      The patient will be admitted for comprehensive interdisciplinary inpatient rehabilitation to address the impairments due to her medical diagnosis of  Embolic stroke involving Right middle cerebral artery.  The patient will benefit from an inpatient rehabilitation program to promote functional recovery, implement compensatory strategies and will undergo assessment for needs for durable medical equipment for safe discharge to the community. This patient will benefit from a coordinated interdisciplinary rehabilitation program services that require close monitoring and treatment with 24-hour rehabilitative nursing and  physical/occupational/speech therapies for 3 hours/day for 5 days/week. This interdisciplinary program will be performed under the direction of a physiatrist.         We will  continue to follow.         Thank you for your consult.     Anisa Arreola NP  Department of Physical Medicine & Rehab  [facility])

## 2023-06-26 NOTE — HOSPITAL COURSE
Per chart review,    OT- 06/27    Bed Mobility:    Patient completed Rolling/Turning to Left with  stand by assistance  Patient completed Scooting/Bridging with stand by assistance  Patient completed Supine to Sit with stand by assistance      Functional Mobility/Transfers:  Patient completed Sit <> Stand Transfer with contact guard assistance  with  rolling walker   Functional Mobility: Pt engaging in functional mobility to simulate household/community distances approx 20 ft with CGA and utilizing RW in order to maximize functional activity tolerance and standing balance required for engagement in occupations of choice.     Activities of Daily Living:  Grooming: contact guard assistance : To perform oral care and to wash her face in standing at the sink for ~ 3 minutes     PT-06/26    Functional Mobility:  Bed Mobility:     Rolling Left:  contact guard assistance  Supine to Sit: minimum assistance  Sit to Supine: moderate assistance  Transfers:     Sit to Stand:  contact guard assistance with rolling walker  Gait: x2 trials -- 46' and 50' CGA, NBOS, flexed posture, decreased clara, decreased step length. Max verbal cues for AD management and gait sequencing and posture.  Pt given therapeutic rest break between trials.   Pt also ambulated to stretcher for test at conclusion of session. At stretcher, pt mod A for sit->supine due to elevated height as compared to bed.        PT- 06/22    Functional Mobility:     Bed Mobility:  Supine > Sit with minimum assistance  Sit > Supine with moderate assistance  Scooting/bridging with moderate assistance      Transfers:   Sit <> Stand Transfer: Minimal Assistance x 1 trials from EOB with  no AD, HHA provided              Gait:  Pt performed trial of 3-4 side steps along bedside to L with min A of 2, bilateral HHA.    OT- 06/22    Bed Mobility:    Patient completed Scooting/Bridging with moderate assistance  Patient completed Supine to Sit with minimum assistance  Patient  completed Sit to Supine with moderate assistance  Pt tolerated sitting EOB for ~7 minutes with CGA-SBA     Functional Mobility/Transfers:  Patient completed Sit <> Stand Transfer with minimum assistance  with  no assistive device and hand-held assist   Functional Mobility: Pt performed 3-4 side steps to the left from EOB with minimum (A) x2 people and HHA.    Activities of Daily Living:  Grooming: stand by assistance to wash face sitting EOB  Toileting: dependence to void via catheter

## 2023-06-26 NOTE — RESPIRATORY THERAPY
RAPID RESPONSE RESPIRATORY THERAPY PROACTIVE NOTE           Time of visit: 1029     Code Status: Full Code   : 1950  Bed: /930 A:   MRN: [id number]  Time spent at the bedside: 15 -30 min    SITUATION    Evaluated patient for: Possible stridor/Pt assessment    BACKGROUND    Why is the patient in the hospital?: Embolic stroke involving right middle cerebral artery    Patient has a past medical history of Asthma and COPD (chronic obstructive pulmonary disease).    24 Hours Vitals Range:  Temp:  [98 °F (36.7 °C)-99.4 °F (37.4 °C)]   Pulse:  [55-74]   Resp:  [15-28]   BP: (106-128)/(62-86)   SpO2:  [93 %-99 %]     Labs:    Recent Labs     23  0622 23  0858 23  0841    139 138   K 3.1* 3.0* 3.4*    98 97   CO2  28 26   CREATININE 0.6 0.7 0.7   GLU 96 109 86   PHOS 2.9 3.2 3.3   MG 2.1 2.1 2.2        No results for input(s): PH, PCO2, PO2, HCO3, POCSATURATED, BE in the last 72 hours.    ASSESSMENT/INTERVENTIONS  Called by room RT Michelle Vera at 1026 to assess pt for possible stridor. When I got to room, I could hear expiratory wheezing from several feet away. Per RN, pt had aspirated while eating breakfast earlier in the morning. Upon ascultation, I heard upper airway expiratory wheezing. I recommended that the RT message the treatment team to get a PRN order for racemic epinephrine. Will follow up with RT and pt later today. Pt on RA currently, sat 95%.    Last VS   Temp: 99.4 °F (37.4 °C) ( 1111)  Pulse: 66 ( 1126)  Resp: 26 ( 1126)  BP: 118/69 ( 1111)  SpO2: 99 % ( 112)    Level of Consciousness: Level of Consciousness (AVPU): alert  Respiratory Effort: Respiratory Effort: Normal, Unlabored Expansion/Accessory Muscle Usage: Expansion/Accessory Muscles/Retractions: accessory muscle use  All Lung Field Breath Sounds: All Lung Fields Breath Sounds: Anterior:, Lateral:, stridor, expiratory  JOSE Breath Sounds: wheezes, expiratory, coarse  LLL Breath Sounds:  stridor, expiratory, wheezes, expiratory, continuous, coarse  RUL Breath Sounds: coarse, wheezes, expiratory  RML Breath Sounds: coarse  RLL Breath Sounds: wheezes, expiratory  O2 Device/Concentration: RA, 21%  NIPPV: No Surgical airway: No  ETCO2 monitored:    Ambu at bedside:      Active Orders   Respiratory Care    Inhalation Treatment Daily     Frequency: Daily     Number of Occurrences: Until Specified    Inhalation Treatment Q6H PRN     Frequency: Q6H PRN     Number of Occurrences: Until Specified    Oxygen PRN     Frequency: PRN     Number of Occurrences: Until Specified     Order Questions:      Device type: Low flow      Device: Nasal Cannula (1- 5 Liters)      LPM: 2      Titrate O2 per Oxygen Titration Protocol: Yes      To maintain SpO2 goal of: >= 92%      Notify MD of: Inability to achieve desired SpO2    Pulse Oximetry Q4H     Frequency: Q4H     Number of Occurrences: Until Specified       RECOMMENDATIONS    We recommend: RRT Recs: Continue POC per primary team.    FOLLOW-UP    Please call back the Rapid Response RT, Elpidio Quintero RRT at x [phone] for any questions or concerns.

## 2023-06-26 NOTE — SUBJECTIVE & OBJECTIVE
Past Medical History:   Diagnosis Date    Asthma     COPD (chronic obstructive pulmonary disease)      Past Surgical History:   Procedure Laterality Date    ANGIOGRAM, CORONARY, WITH LEFT HEART CATHETERIZATION Left 6/22/2023    Procedure: Angiogram, Coronary, with Left Heart Cath;  Surgeon: Gab Caldwell MD;  Location: [facility] CATH LAB;  Service: Cardiology;  Laterality: Left;     Review of patient's allergies indicates:   Allergen Reactions    Penicillins Other (See Comments)       Scheduled Medications:    apixaban  5 mg Oral BID    atorvastatin  40 mg Oral Daily    cefTRIAXone (ROCEPHIN) IVPB  1 g Intravenous Q24H    folic acid  1 mg Oral Daily    furosemide (LASIX) injection  40 mg Intravenous Q12H    levETIRAcetam  500 mg Oral BID    melatonin  6 mg Oral Nightly    metoprolol succinate  12.5 mg Oral Daily    multivitamin  1 tablet Oral Daily    mupirocin   Nasal BID    polyethylene glycol  17 g Oral Daily    potassium chloride  20 mEq Oral Daily    senna-docusate 8.6-50 mg  1 tablet Oral BID    thiamine  100 mg Oral Daily    tiotropium bromide  2 puff Inhalation Daily       PRN Medications: acetaminophen, albuterol sulfate, benzonatate, dextrose 10%, dextrose 10%, dextrose, dextrose, glucagon (human recombinant), naloxone, nicotine, ondansetron, sodium chloride 0.9%, sodium chloride 0.9%    Family History    None       Tobacco Use    Smoking status: Every Day    Smokeless tobacco: Never   Substance and Sexual Activity    Alcohol use: Yes    Drug use: Not on file    Sexual activity: Not on file     Review of Systems   Constitutional:  Positive for activity change.   Eyes: Negative.    Respiratory: Negative.     Cardiovascular:  Negative for chest pain and leg swelling.   Gastrointestinal: Negative.    Genitourinary: Negative.    Musculoskeletal:  Positive for gait problem.   Neurological:  Positive for weakness.   Psychiatric/Behavioral:  Positive for behavioral problems and confusion.         BUE restraints  in place.    Objective:     Vital Signs (Most Recent):  Temp: 98.1 °F (36.7 °C) (06/26/23 0702)  Pulse: 62 (06/26/23 0928)  Resp: 18 (06/26/23 0922)  BP: 106/62 (06/26/23 0928)  SpO2: 95 % (06/26/23 0922)    Vital Signs (24h Range):  Temp:  [97.3 °F (36.3 °C)-98.7 °F (37.1 °C)] 98.1 °F (36.7 °C)  Pulse:  [55-72] 62  Resp:  [15-18] 18  SpO2:  [95 %-99 %] 95 %  BP: (106-128)/(60-86) 106/62     Body mass index is 26.11 kg/m².     Physical Exam  Vitals and nursing note reviewed.   Constitutional:       General: She is awake.      Appearance: Normal appearance.   HENT:      Head: Normocephalic and atraumatic.   Eyes:      Extraocular Movements: Extraocular movements intact.   Pulmonary:      Effort: Pulmonary effort is normal. No respiratory distress.   Abdominal:      General: There is no distension.      Palpations: Abdomen is soft.   Musculoskeletal:      Cervical back: Normal range of motion and neck supple.   Skin:     General: Skin is warm and dry.      Capillary Refill: Capillary refill takes 2 to 3 seconds.   Neurological:      General: No focal deficit present.      Mental Status: She is alert and oriented to person, place, and time.      GCS: GCS eye subscore is 4. GCS verbal subscore is 5. GCS motor subscore is 6.      Motor: Weakness present.      Gait: Gait abnormal.   Psychiatric:         Mood and Affect: Mood normal. Affect is flat.         Speech: Speech is delayed.         Behavior: Behavior is slowed. Behavior is cooperative.        NEUROLOGICAL EXAMINATION:     MENTAL STATUS   Oriented to person, place, and time.     Diagnostic Results: Labs: Reviewed

## 2023-06-26 NOTE — ASSESSMENT & PLAN NOTE
-MRI brain W WO contrast with moderate sized R frontal infarct with hemorrhagic conversion and without evidence of underlying mass.   -CTA completed overnight which showed stable hemorrhagic infarct with no LVO/critical stenosis and noted incidental 4mm R ICA aneurysm.   -TTE- EF of 18%.  -Cardiology had seen pt and had performed LHC. Per Card, Non-obstructive disease, Pt to continue supportive management.   -PT/OT rec for IPR. Pt amenable.

## 2023-06-26 NOTE — PT/OT/SLP PROGRESS
"Physical Therapy Treatment    Patient Name:  Svetlana Beck   MRN:  [id number]    Recommendations:     Discharge Recommendations: rehabilitation facility  Discharge Equipment Recommendations: to be determined by next level of care  Barriers to discharge: Increased level of assistance and fall risk     Assessment:     Svetlana Beck is a 72 y.o. female admitted with a medical diagnosis of Embolic stroke involving right middle cerebral artery.  She presents with the following impairments/functional limitations: weakness, impaired endurance, impaired self care skills, impaired functional mobility, gait instability, impaired balance, impaired cognition, decreased coordination, decreased upper extremity function, decreased lower extremity function, decreased safety awareness, impaired cardiopulmonary response to activity.    Rehab Prognosis: Good; patient would benefit from acute skilled PT services to address these deficits and reach maximum level of function.    Recent Surgery: Procedure(s) (LRB):  Angiogram, Coronary, with Left Heart Cath (Left) 4 Days Post-Op    Plan:     During this hospitalization, patient to be seen 4 x/week to address the identified rehab impairments via gait training, therapeutic activities, therapeutic exercises, neuromuscular re-education and progress toward the following goals:    Plan of Care Expires:  07/22/23    Subjective     Chief Complaint: "I am ready to move"   Patient/Family Comments/goals:   Pain/Comfort:  Pain Rating 1: 0/10      Objective:     Communicated with RN prior to session.  Patient found HOB elevated with BRIA Whitley upon PT entry to room.     General Precautions: Standard, fall  Orthopedic Precautions: N/A  Braces: N/A  Respiratory Status: Room air     Functional Mobility:  Bed Mobility:     Rolling Left:  contact guard assistance  Supine to Sit: minimum assistance  Sit to Supine: moderate assistance  Transfers:     Sit to Stand:  contact guard assistance with rolling " walker  Gait: x2 trials -- 46' and 50' CGA, NBOS, flexed posture, decreased clara, decreased step length. Max verbal cues for AD management and gait sequencing and posture.  Pt given therapeutic rest break between trials.   Pt also ambulated to stretcher for test at conclusion of session. At stretcher, pt mod A for sit->supine due to elevated height as compared to bed.     Education provided on posture, gait sequencing and widening ERNIE.         AM-PAC 6 CLICK MOBILITY  Turning over in bed (including adjusting bedclothes, sheets and blankets)?: 3  Sitting down on and standing up from a chair with arms (e.g., wheelchair, bedside commode, etc.): 3  Moving from lying on back to sitting on the side of the bed?: 3  Moving to and from a bed to a chair (including a wheelchair)?: 3  Need to walk in hospital room?: 3  Climbing 3-5 steps with a railing?: 2  Basic Mobility Total Score: 17       Patient left  on stretcher with transportation team member  with all lines intact..    GOALS:   Multidisciplinary Problems       Physical Therapy Goals          Problem: Physical Therapy    Goal Priority Disciplines Outcome Goal Variances Interventions   Physical Therapy Goal     PT, PT/OT Ongoing, Progressing     Description: Goals to be met by: 23    Patient will increase functional independence with mobility by performin. Supine <>sit with Modified [place]  2. Sit to stand transfer with Modified [place]  3. Gait  x 150 feet with Supervision with or without using LRAD   4. Stand for 5 minutes with Supervision using No Assistive Device  5. Lower extremity exercise program x20 reps per handout, with supervision                         Time Tracking:     PT Received On: 23  PT Start Time: 1237     PT Stop Time: 1301  PT Total Time (min): 24 min     Billable Minutes: Gait Training 23    Treatment Type: Treatment  PT/PTA: PTA     Number of PTA visits since last PT visit: 2023

## 2023-06-26 NOTE — ASSESSMENT & PLAN NOTE
History of, on albuterol PRN at home per chart  Pulmonology curbsided , appreciate recs:  --scheduled duonebs q4h while awake x 4 doses  --budesonide stopped, started spiriva 2 puffs daily (continue at [place])  --consider prednisone 40mg for 5 days unless any contraindications from cardiology or neuro perspectives  --refer to pulmonology for outpatient follow up as she will need PFTs for further evaluation of her suspected obstructive lung disease    Continue daily spiriva, duonebs q4hrs while awake  Supplemental O2 PRN to maintain SpO2 goal >88%

## 2023-06-26 NOTE — PT/OT/SLP PROGRESS
"Speech Language Pathology Treatment    Patient Name:  Svetlana Beck   MRN:  [id number]  Admitting Diagnosis: Embolic stroke involving right middle cerebral artery    Recommendations:                 General Recommendations:  Dysphagia therapy, Cognitive-linguistic evaluation, and Modified barium swallow study  Diet recommendations:  NPO except for medications crushed in puree and puree for pleasure   Aspiration Precautions:  Only with 1:1 direct supervision for safety, 1 bite/sip at a time, Eliminate distractions, Feed only when awake/alert, Frequent oral care, HOB to 90 degrees, Meds crushed in puree, Monitor for s/s of aspiration, Small bites/sips, and Strict aspiration precautions Continue to monitor for signs and symptoms of aspiration and discontinue oral feeding should you notice any of the following: watery eyes, reddened facial area, wet vocal quality, increased work of breathing, change in respiratory status, increased congestion, coughing, fever and/or change in level of alertness  General Precautions: Standard, aspiration, fall  Communication strategies:  go to room if call light pushed    Assessment:     Svetlana Beck is a 72 y.o. female with an SLP diagnosis of Dysphagia.  She presents with c/o difficulty swallowing breakfast meal and medications this am.Patient would benefit from further objective assessment via MBSS when feasible to determine safest, least restrictive means nutrition/hydration .    Subjective     SLP reviewed Pt with RN, RN explained Pt with aspiration event at breakfast, difficulty taking pills. RN further explained lethargic following CT and requested ST to return later service day  Pt presents confused  She explains, "I need to get dressed so I can go"     Pain/Comfort:  Pain Rating 1: 0/10    Respiratory Status: Room air    Objective:     Has the patient been evaluated by SLP for swallowing?   Yes  Keep patient NPO? No   Current Respiratory Status:  Room air      Pt unavailable " "upon initial attempts (RN care, PT, off the floor for testing, lethargy.) Upon later attempt, Pt found awake in bed with telesitter in place. RN in room to help SLP reposition Pt in bed and HOB elevated. Pt was alert and oriented x2. She followed simple commands WNL. She endorsed difficulty with swallowing pills in the am. Pt endorsed breakfast items and pills "got stuck" in her throat earlier service day.  She endorsed difficulty swallowing liquids for ~ 1  year. She was seen with trials of thin liquids (cup edge sips x5), puree (tsp bites x9) and soft solids (green bean on dinner meal tray.) Pt with persistent throat clears following sip thin liquids x2 and bite of soft solid x1.  No overt S/S aspiration with bites of puree. She was educated on SLP role, option for further objective assessment via MBSS to determine safest diet, aspiration precautions and SLP POC. Pt with partial demonstration of understanding. No additional questions. She remained upright in bed with call light in reach, telesitter in the room, upon SLP exit. Findings reviewed with RN and MD team upon SLP exit.     Goals:   Multidisciplinary Problems       SLP Goals          Problem: SLP    Goal Priority Disciplines Outcome   SLP Goal     SLP    Description: Speech Language Pathology Goals  Goals expected to be met by 7/1:  1. Pt will tolerate least restrictive diet and thin liquids without s/s of aspiration.                                Plan:     Patient to be seen:  3 x/week   Plan of Care expires:  07/23/23  Plan of Care reviewed with:  patient   SLP Follow-Up:  Yes       Discharge recommendations:   (tbd)       Time Tracking:     SLP Treatment Date:   06/26/23  Speech Start Time:  1652  Speech Stop Time:  1709     Speech Total Time (min):  17 min    Billable Minutes: Treatment Swallowing Dysfunction 15    06/26/2023  "

## 2023-06-26 NOTE — HPI
Per chart review, 72 year old woman transferred from OSH for imaging evaluation by Neurosurgery due to concern for mass vs hematoma complicated by persistently elevated troponin on arrival requiring admission to CCU for diagnostic left heart cath. Further imaging notable for right frontal infarction with hemorrhagic transformation; likely acute in timeline. NIHSS 4; likely pseudo elevated as patient examined immediately following procedure. No focal deficits noted. Remained in CCU overnight 6/22 and was transferred to  primary service on 6/23am. MRI brain W WO contrast with moderate sized R frontal infarct with hemorrhagic conversion and without evidence of underlying mass. CTA completed overnight which showed stable hemorrhagic infarct with no LVO/critical stenosis and noted incidental 4mm R ICA aneurysm. Suspect hemorrhagic stroke etiology to be cardio-embolic vs large vessel atherosclerotic disease. Pt S/P LHC per cardiology with non-obstructive disease. Cardiology recommends supportive management. PM &R was consulted to evaluate pt for IPR placement.         Functional History: Patient lives  with friend in a single  story home with 6 steps to enter.  Prior to admission, Pt was I with ADLs and mobility.  DME: None.

## 2023-06-26 NOTE — PROGRESS NOTES
[facility] - Neurosurgery ([facility])  Vascular Neurology  Comprehensive Stroke Center  Progress Note    Assessment/Plan:     * Embolic stroke involving right middle cerebral artery  72 year old woman transferred from OSH for imaging evaluation by Neurosurgery due to concern for mass vs hematoma complicated by persistently elevated troponin on arrival requiring admission to CCU for diagnostic left heart cath. Further imaging notable for right frontal infarction with hemorrhagic transformation; likely acute in timeline. NIHSS 4; likely pseudo elevated as patient examined immediately following procedure. No focal deficits noted. Remained in CCU overnight 6/22 and was transferred to  primary service on 6/23am. MRI brain W WO contrast with moderate sized R frontal infarct with hemorrhagic conversion and without evidence of underlying mass. CTA completed overnight which showed stable hemorrhagic infarct with no LVO/critical stenosis and noted incidental 4mm R ICA aneurysm.   Suspect hemorrhagic stroke etiology to be cardio-embolic vs large vessel atherosclerotic disease.    NAEO. This morning was more lethargic and difficult to arouse but still able to move all extremities without any obvious focal deficits, repeat CTH stable. Also notified by RN of concern for aspiration when patient was eating breakfast. Respiratory called to bedside for breathing treatment due to coughing, PRN racepinepherine ordered for audible wheezing and concern for stridor. Awaiting IPR placement, and patient must remain out of restraints x24hrs.      Antithrombotics for secondary stroke prevention: Eliquis 5mg BID    Statins for secondary stroke prevention and HLD, if present: Statins: Atorvastatin- 40 mg daily    Aggressive risk factor modification: Smoking, Diet, Exercise, CAD     Rehab efforts: The patient has been evaluated by a stroke team provider and the therapy needs have been fully considered based off the presenting complaints and exam  findings. The following therapy evaluations are needed: PT evaluate and treat, OT evaluate and treat, SLP evaluate and treat dispo recs for IPR, diet recs for dental soft/thin liquids    Diagnostics ordered/pending: None     VTE prophylaxis: Mechanical prophylaxis: Place SCDs  None: Reason for No Pharmacological VTE Prophylaxis: Currently on anticoagulation    BP parameters:  SBP <160    Alcohol abuse, daily use  -Stroke risk factor.  Patient self reports drinking 1-2 beers daily.  Last drink 1 month ago.  -Encourage continued cessation.      Bacteremia due to Gram-negative bacteria  Bcx with GNR, PCR with e coli  Cefepime switched to ceftriaxone 1g daily, end date 6/30    Takotsubo cardiomyopathy  Cardiology consulted on arrival and initially admitted to CICU for further workup of trop elevation with acute EF drop and apical ballooning on bedside echo    Recs as per 6/22 progress note:  WMA consistent with typical Takotsubo cardiomyopathy  [facility] with nonobstructive disease  - Supportive care  - Beta blockade when able, resting HR currently low  - Elevated LVEDP on [facility]  - Lasix 40mg IV BID    Smoker  Stroke risk factor  Continue to encourage cessation when the patient is able to engage  Nicotine patch PRN    Chronic obstructive pulmonary disease  History of, on albuterol PRN at home per chart  Pulmonology curbsided , appreciate recs:  --scheduled duonebs q4h while awake x 4 doses  --budesonide stopped, started spiriva 2 puffs daily (continue at [place])  --consider prednisone 40mg for 5 days unless any contraindications from cardiology or neuro perspectives  --refer to pulmonology for outpatient follow up as she will need PFTs for further evaluation of her suspected obstructive lung disease    Continue daily spiriva, duonebs q4hrs while awake  Supplemental O2 PRN to maintain SpO2 goal >88%         06/23/2023 NAEO, neuro exam stable and nonfocal. NIHSS 0. CTA overnight with stable hemorrhagic conversion and no LVO/high  grade stenosis. Eliquis started for stroke prevention due to EF 18%. Cefepime switched to ceftriaxone for e coli bacteremia. Pulmonology curbsided for recs due to history of COPD, appreciate assistance. SLP consulted for swallow eval. Dispo recs for IPR, placement pending.  6/24/2023 Low urine output overnight.  30 cc on bladder scan.  Patient has EF 18% on last TTE.  Cardiology saw patient on 6/22 s/p [facility] and recommended Lasix 40 mg BID, will start.  Patient restless overnight.  She has a self-reported history of drinking 1-2 beers a day.  Last drink 1 month ago.  06/25/2023 NAEO, neuro exam stable. Restlessness improved today, restraints removed. Reports breathing improved with spiriva, will continue at discharge. Pending IPR placement.  06/26/2023 NAEO. This morning was more lethargic and difficult to arouse but still able to move all extremities without any obvious focal deficits, repeat CTH stable. Also notified by RN of concern for aspiration when patient was eating breakfast. Respiratory called to bedside for breathing treatment due to coughing, PRN racepinepherine ordered for audible wheezing and concern for stridor. Awaiting IPR placement, and patient must remain out of restraints x24hrs.        STROKE DOCUMENTATION   Acute Stroke Times   Unknown Normal Date: Unknown Date  Unknown Normal Time: Unknown Time  Unknown Symptom Onset Date: Unknown Date  Unknown Symptom Onset Time: Unknown Time  Thrombolytic Therapy Recommended: No  Thrombectomy Recommended: No    NIH Scale:  1a. Level of Consciousness: 2-->Not alert, requires repeated stimulation to attend, or is obtunded and requires strong or painful stimulation to make movements (not stereotyped)  1b. LOC Questions: 1-->Answers one question correctly  1c. LOC Commands: 1-->Performs one task correctly  2. Best Gaze: 0-->Normal  3. Visual: 0-->No visual loss  4. Facial Palsy: 0-->Normal symmetrical movements  5a. Motor Arm, Left: 0-->No drift, limb holds 90 (or  45) degrees for full 10 secs  5b. Motor Arm, Right: 0-->No drift, limb holds 90 (or 45) degrees for full 10 secs  6a. Motor Leg, Left: 0-->No drift, leg holds 30 degree position for full 5 secs  6b. Motor Leg, Right: 0-->No drift, leg holds 30 degree position for full 5 secs  7. Limb Ataxia: 0-->Absent  8. Sensory: 0-->Normal, no sensory loss  9. Best Language: 0-->No aphasia, normal  10. Dysarthria: 0-->Normal  11. Extinction and Inattention (formerly Neglect): 0-->No abnormality  Total (NIH Stroke Scale): 4       Modified [place] Coma Scale:    ABCD2 Score:    [id number] Score:   HAS -BLED Score:   ICH Score:   Hunt & Mckeon Classification:      Hemorrhagic change of an Ischemic Stroke: Does this patient have an ischemic stroke with hemorrhagic changes? No     Neurologic Chief Complaint: Hemorrhagic conversion of R MCA infarct    Subjective:     Interval History: Patient is seen for follow-up neurological assessment and treatment recommendations:   NAEO. This morning was more lethargic and difficult to arouse but still able to move all extremities without any obvious focal deficits, repeat CTH stable. Also notified by RN of concern for aspiration when patient was eating breakfast. Respiratory called to bedside for breathing treatment due to coughing, PRN racepinepherine ordered for audible wheezing and concern for stridor. Awaiting IPR placement, and patient must remain out of restraints x24hrs.    HPI, Past Medical, Family, and Social History remains the same as documented in the initial encounter.     Review of Systems   Constitutional:  Positive for activity change. Negative for fatigue.   HENT:  Negative for drooling and trouble swallowing.    Eyes:  Negative for visual disturbance.   Respiratory:  Positive for shortness of breath (improved).    Cardiovascular:  Negative for palpitations.   Gastrointestinal:  Negative for nausea and vomiting.   Musculoskeletal:  Negative for neck stiffness.   Skin:   Negative for color change.   Neurological:  Negative for speech difficulty, weakness and headaches.   Psychiatric/Behavioral:  Negative for confusion.    All other systems reviewed and are negative.  Scheduled Meds:   apixaban  5 mg Oral BID    atorvastatin  40 mg Oral Daily    cefTRIAXone (ROCEPHIN) IVPB  1 g Intravenous Q24H    folic acid  1 mg Oral Daily    furosemide (LASIX) injection  40 mg Intravenous Q12H    levETIRAcetam  500 mg Oral BID    melatonin  6 mg Oral Nightly    metoprolol succinate  12.5 mg Oral Daily    multivitamin  1 tablet Oral Daily    mupirocin   Nasal BID    polyethylene glycol  17 g Oral Daily    potassium chloride  20 mEq Oral Daily    senna-docusate 8.6-50 mg  1 tablet Oral BID    thiamine  100 mg Oral Daily    tiotropium bromide  2 puff Inhalation Daily     Continuous Infusions:  PRN Meds:acetaminophen, albuterol sulfate, benzonatate, dextrose 10%, dextrose 10%, dextrose, dextrose, glucagon (human recombinant), naloxone, nicotine, ondansetron, racepinephrine, sodium chloride 0.9%, sodium chloride 0.9%    Objective:     Vital Signs (Most Recent):  Temp: 99.4 °F (37.4 °C) (06/26/23 1111)  Pulse: 66 (06/26/23 1126)  Resp: (!) 26 (06/26/23 1126)  BP: 118/69 (06/26/23 1111)  SpO2: 99 % (06/26/23 1126)  BP Location: Left arm    Vital Signs Range (Last 24H):  Temp:  [98 °F (36.7 °C)-99.4 °F (37.4 °C)]   Pulse:  [55-74]   Resp:  [15-28]   BP: (106-128)/(62-86)   SpO2:  [93 %-99 %]   BP Location: Left arm       Physical Exam  Vitals and nursing note reviewed.   Constitutional:       General: She is not in acute distress.  HENT:      Head: Normocephalic.      Nose: Nose normal.      Mouth/Throat:      Pharynx: No oropharyngeal exudate or posterior oropharyngeal erythema.   Eyes:      Extraocular Movements: Extraocular movements intact.      Conjunctiva/sclera: Conjunctivae normal.   Cardiovascular:      Rate and Rhythm: Normal rate.   Pulmonary:      Effort: Pulmonary effort is  normal. No respiratory distress.   Abdominal:      General: There is no distension.   Musculoskeletal:         General: No deformity or signs of injury.      Cervical back: Normal range of motion. No rigidity.   Skin:     General: Skin is warm and dry.   Neurological:      Mental Status: She is lethargic.      Cranial Nerves: No dysarthria or facial asymmetry.      Sensory: Sensation is intact.      Motor: No weakness, tremor or seizure activity.      Coordination: Coordination is intact.   Psychiatric:         Attention and Perception: She is inattentive.            Neurological Exam:   LOC: lethargic  Attention Span: poor   Language: No aphasia  Articulation: No dysarthria  Orientation: Person, Place, Time   Visual Fields: Full  EOM (CN III, IV, VI): Full/intact  Facial Sensation (CN V): Normal  Facial Movement (CN VII): Symmetric facial expression    Motor: LUE Normal 5/5, LLE Normal 5/5, RUE Normal 5/5, RLE Normal 5/5  Sensation: Intact to light touch      Laboratory:  CMP:   Recent Labs   Lab 06/26/23  0841   CALCIUM 9.2   ALBUMIN 3.4*   PROT 7.9      K 3.4*   CO2 26   CL 97   BUN 13   CREATININE 0.7   ALKPHOS 73   ALT 89*   AST 97*   BILITOT 0.7       CBC:   Recent Labs   Lab 06/26/23  0841   WBC 10.11   RBC 4.19   HGB 15.5   HCT 45.6      *   MCH 37.0*   MCHC 34.0       Lipid Panel:   Recent Labs   Lab 06/23/23  0359   CHOL 128   LDLCALC 65.6   HDL 38*   TRIG 122       Coagulation:   Recent Labs   Lab 06/21/23  1721   INR 1.0   APTT 22.1       Hgb A1C:   Recent Labs   Lab 06/23/23  0359   HGBA1C 4.6       TSH: No results for input(s): TSH in the last 168 hours.    Diagnostic Results     Brain Imaging   CTH without contrast 6/26/2023  Allowing for scatter artifacts evolving right inferior frontal infarction with hemorrhagic conversion.  No significant new hemorrhage or increased mass effect.    MRI brain W WO contrast 6/22/2023  Moderate sized right frontal hemorrhagic infarction, similar  to prior CT.  No underlying mass lesions appreciated.  Follow up examination as clinically indicated.     CTH without contrast 6/21/2023  Hyperdense mass right posterior frontal region could represent a hematoma.  This is probably most likely.  A brain neoplasm is thought to be less likely.  For further evaluation MRI of the brain without with contrast is recommended.      Vessel Imaging   CTA head/neck 6/23/2023  Right frontal hemorrhagic infarct again identified with mild surrounding edema and localized mass effect.  No midline shift.   CTA shows no high-grade stenosis or large vessel occlusion.  No vascular malformation underlying the right the frontal hemorrhage.   Incidental note is made of a 4 mm right ICA aneurysm.  Recommend follow-up to ensure long-term stability.  This can likely be followed with MRA.   Centrilobular emphysematous changes at the lung apices.       Cardiac Imaging   TTE 6/22/2023   The left ventricle is normal in size with mild eccentric hypertrophy and severely decreased systolic function.   The estimated ejection fraction is 18%.   There are segmental left ventricular wall motion abnormalities.   Grade I left ventricular diastolic dysfunction.   Normal right ventricular size with normal right ventricular systolic function.   Elevated central venous pressure (15 mmHg).   Trivial posterior pericardial effusion. Just at the base.   Compared with 10/27/2020, marked worsening has occured.      ALVERTO Perez  Comprehensive Stroke Center  Department of Vascular Neurology   [facility] - Neurosurgery [facility])

## 2023-06-26 NOTE — SUBJECTIVE & OBJECTIVE
Neurologic Chief Complaint: Hemorrhagic conversion of R MCA infarct    Subjective:     Interval History: Patient is seen for follow-up neurological assessment and treatment recommendations:   NAEO. This morning was more lethargic and difficult to arouse but still able to move all extremities without any obvious focal deficits, repeat CTH stable. Also notified by RN of concern for aspiration when patient was eating breakfast. Respiratory called to bedside for breathing treatment due to coughing, PRN racepinepherine ordered for audible wheezing and concern for stridor. Awaiting IPR placement, and patient must remain out of restraints x24hrs.    HPI, Past Medical, Family, and Social History remains the same as documented in the initial encounter.     Review of Systems   Constitutional:  Positive for activity change. Negative for fatigue.   HENT:  Negative for drooling and trouble swallowing.    Eyes:  Negative for visual disturbance.   Respiratory:  Positive for shortness of breath (improved).    Cardiovascular:  Negative for palpitations.   Gastrointestinal:  Negative for nausea and vomiting.   Musculoskeletal:  Negative for neck stiffness.   Skin:  Negative for color change.   Neurological:  Negative for speech difficulty, weakness and headaches.   Psychiatric/Behavioral:  Negative for confusion.    All other systems reviewed and are negative.  Scheduled Meds:   apixaban  5 mg Oral BID    atorvastatin  40 mg Oral Daily    cefTRIAXone (ROCEPHIN) IVPB  1 g Intravenous Q24H    folic acid  1 mg Oral Daily    furosemide (LASIX) injection  40 mg Intravenous Q12H    levETIRAcetam  500 mg Oral BID    melatonin  6 mg Oral Nightly    metoprolol succinate  12.5 mg Oral Daily    multivitamin  1 tablet Oral Daily    mupirocin   Nasal BID    polyethylene glycol  17 g Oral Daily    potassium chloride  20 mEq Oral Daily    senna-docusate 8.6-50 mg  1 tablet Oral BID    thiamine  100 mg Oral Daily    tiotropium bromide  2 puff  Inhalation Daily     Continuous Infusions:  PRN Meds:acetaminophen, albuterol sulfate, benzonatate, dextrose 10%, dextrose 10%, dextrose, dextrose, glucagon (human recombinant), naloxone, nicotine, ondansetron, racepinephrine, sodium chloride 0.9%, sodium chloride 0.9%    Objective:     Vital Signs (Most Recent):  Temp: 99.4 °F (37.4 °C) (06/26/23 1111)  Pulse: 66 (06/26/23 1126)  Resp: (!) 26 (06/26/23 1126)  BP: 118/69 (06/26/23 1111)  SpO2: 99 % (06/26/23 1126)  BP Location: Left arm    Vital Signs Range (Last 24H):  Temp:  [98 °F (36.7 °C)-99.4 °F (37.4 °C)]   Pulse:  [55-74]   Resp:  [15-28]   BP: (106-128)/(62-86)   SpO2:  [93 %-99 %]   BP Location: Left arm       Physical Exam  Vitals and nursing note reviewed.   Constitutional:       General: She is not in acute distress.  HENT:      Head: Normocephalic.      Nose: Nose normal.      Mouth/Throat:      Pharynx: No oropharyngeal exudate or posterior oropharyngeal erythema.   Eyes:      Extraocular Movements: Extraocular movements intact.      Conjunctiva/sclera: Conjunctivae normal.   Cardiovascular:      Rate and Rhythm: Normal rate.   Pulmonary:      Effort: Pulmonary effort is normal. No respiratory distress.   Abdominal:      General: There is no distension.   Musculoskeletal:         General: No deformity or signs of injury.      Cervical back: Normal range of motion. No rigidity.   Skin:     General: Skin is warm and dry.   Neurological:      Mental Status: She is lethargic.      Cranial Nerves: No dysarthria or facial asymmetry.      Sensory: Sensation is intact.      Motor: No weakness, tremor or seizure activity.      Coordination: Coordination is intact.   Psychiatric:         Attention and Perception: She is inattentive.            Neurological Exam:   LOC: lethargic  Attention Span: poor   Language: No aphasia  Articulation: No dysarthria  Orientation: Person, Place, Time   Visual Fields: Full  EOM (CN III, IV, VI): Full/intact  Facial Sensation  (CN V): Normal  Facial Movement (CN VII): Symmetric facial expression    Motor: LUE Normal 5/5, LLE Normal 5/5, RUE Normal 5/5, RLE Normal 5/5  Sensation: Intact to light touch      Laboratory:  CMP:   Recent Labs   Lab 06/26/23  0841   CALCIUM 9.2   ALBUMIN 3.4*   PROT 7.9      K 3.4*   CO2 26   CL 97   BUN 13   CREATININE 0.7   ALKPHOS 73   ALT 89*   AST 97*   BILITOT 0.7       CBC:   Recent Labs   Lab 06/26/23  0841   WBC 10.11   RBC 4.19   HGB 15.5   HCT 45.6      *   MCH 37.0*   MCHC 34.0       Lipid Panel:   Recent Labs   Lab 06/23/23  0359   CHOL 128   LDLCALC 65.6   HDL 38*   TRIG 122       Coagulation:   Recent Labs   Lab 06/21/23  1721   INR 1.0   APTT 22.1       Hgb A1C:   Recent Labs   Lab 06/23/23  0359   HGBA1C 4.6       TSH: No results for input(s): TSH in the last 168 hours.    Diagnostic Results     Brain Imaging   CTH without contrast 6/26/2023  Allowing for scatter artifacts evolving right inferior frontal infarction with hemorrhagic conversion.  No significant new hemorrhage or increased mass effect.    MRI brain W WO contrast 6/22/2023  Moderate sized right frontal hemorrhagic infarction, similar to prior CT.  No underlying mass lesions appreciated.  Follow up examination as clinically indicated.     CTH without contrast 6/21/2023  Hyperdense mass right posterior frontal region could represent a hematoma.  This is probably most likely.  A brain neoplasm is thought to be less likely.  For further evaluation MRI of the brain without with contrast is recommended.      Vessel Imaging   CTA head/neck 6/23/2023  Right frontal hemorrhagic infarct again identified with mild surrounding edema and localized mass effect.  No midline shift.   CTA shows no high-grade stenosis or large vessel occlusion.  No vascular malformation underlying the right the frontal hemorrhage.   Incidental note is made of a 4 mm right ICA aneurysm.  Recommend follow-up to ensure long-term stability.  This can  likely be followed with MRA.   Centrilobular emphysematous changes at the lung apices.       Cardiac Imaging   TTE 6/22/2023  The left ventricle is normal in size with mild eccentric hypertrophy and severely decreased systolic function.  The estimated ejection fraction is 18%.  There are segmental left ventricular wall motion abnormalities.  Grade I left ventricular diastolic dysfunction.  Normal right ventricular size with normal right ventricular systolic function.  Elevated central venous pressure (15 mmHg).  Trivial posterior pericardial effusion. Just at the base.  Compared with 10/27/2020, marked worsening has occured.

## 2023-06-27 PROBLEM — B96.20 E COLI BACTEREMIA: Status: ACTIVE | Noted: 2023-06-22

## 2023-06-27 LAB
ALBUMIN SERPL BCP-MCNC: 3.3 G/DL (ref 3.5–5.2)
ALP SERPL-CCNC: 64 U/L (ref 55–135)
ALT SERPL W/O P-5'-P-CCNC: 116 U/L (ref 10–44)
ANION GAP SERPL CALC-SCNC: 14 MMOL/L (ref 8–16)
AST SERPL-CCNC: 100 U/L (ref 10–40)
BASOPHILS # BLD AUTO: 0.05 K/UL (ref 0–0.2)
BASOPHILS NFR BLD: 0.5 % (ref 0–1.9)
BILIRUB SERPL-MCNC: 1 MG/DL (ref 0.1–1)
BUN SERPL-MCNC: 19 MG/DL (ref 8–23)
CALCIUM SERPL-MCNC: 9.6 MG/DL (ref 8.7–10.5)
CHLORIDE SERPL-SCNC: 97 MMOL/L (ref 95–110)
CO2 SERPL-SCNC: 26 MMOL/L (ref 23–29)
CREAT SERPL-MCNC: 0.7 MG/DL (ref 0.5–1.4)
DIFFERENTIAL METHOD: ABNORMAL
EOSINOPHIL # BLD AUTO: 0.1 K/UL (ref 0–0.5)
EOSINOPHIL NFR BLD: 1.4 % (ref 0–8)
ERYTHROCYTE [DISTWIDTH] IN BLOOD BY AUTOMATED COUNT: 10.9 % (ref 11.5–14.5)
EST. GFR  (NO RACE VARIABLE): >60 ML/MIN/1.73 M^2
GLUCOSE SERPL-MCNC: 93 MG/DL (ref 70–110)
HCT VFR BLD AUTO: 43.3 % (ref 37–48.5)
HGB BLD-MCNC: 14.6 G/DL (ref 12–16)
IMM GRANULOCYTES # BLD AUTO: 0.03 K/UL (ref 0–0.04)
IMM GRANULOCYTES NFR BLD AUTO: 0.3 % (ref 0–0.5)
LYMPHOCYTES # BLD AUTO: 1.6 K/UL (ref 1–4.8)
LYMPHOCYTES NFR BLD: 16.9 % (ref 18–48)
MAGNESIUM SERPL-MCNC: 2.3 MG/DL (ref 1.6–2.6)
MCH RBC QN AUTO: 36.2 PG (ref 27–31)
MCHC RBC AUTO-ENTMCNC: 33.7 G/DL (ref 32–36)
MCV RBC AUTO: 107 FL (ref 82–98)
MONOCYTES # BLD AUTO: 1.4 K/UL (ref 0.3–1)
MONOCYTES NFR BLD: 14.2 % (ref 4–15)
NEUTROPHILS # BLD AUTO: 6.4 K/UL (ref 1.8–7.7)
NEUTROPHILS NFR BLD: 66.7 % (ref 38–73)
NRBC BLD-RTO: 0 /100 WBC
PHOSPHATE SERPL-MCNC: 3.7 MG/DL (ref 2.7–4.5)
PLATELET # BLD AUTO: 328 K/UL (ref 150–450)
PMV BLD AUTO: 10.2 FL (ref 9.2–12.9)
POCT GLUCOSE: 106 MG/DL (ref 70–110)
POCT GLUCOSE: 135 MG/DL (ref 70–110)
POTASSIUM SERPL-SCNC: 3.5 MMOL/L (ref 3.5–5.1)
PROT SERPL-MCNC: 7.6 G/DL (ref 6–8.4)
RBC # BLD AUTO: 4.03 M/UL (ref 4–5.4)
SODIUM SERPL-SCNC: 137 MMOL/L (ref 136–145)
WBC # BLD AUTO: 9.58 K/UL (ref 3.9–12.7)

## 2023-06-27 PROCEDURE — 94640 AIRWAY INHALATION TREATMENT: CPT

## 2023-06-27 PROCEDURE — 97535 SELF CARE MNGMENT TRAINING: CPT

## 2023-06-27 PROCEDURE — 92526 ORAL FUNCTION THERAPY: CPT

## 2023-06-27 PROCEDURE — 99233 PR SUBSEQUENT HOSPITAL CARE,LEVL III: ICD-10-PCS | Mod: GC,,, | Performed by: PSYCHIATRY & NEUROLOGY

## 2023-06-27 PROCEDURE — 25000003 PHARM REV CODE 250: Performed by: NURSE PRACTITIONER

## 2023-06-27 PROCEDURE — 85025 COMPLETE CBC W/AUTO DIFF WBC: CPT | Performed by: PHYSICIAN ASSISTANT

## 2023-06-27 PROCEDURE — 99233 SBSQ HOSP IP/OBS HIGH 50: CPT | Mod: GC,,, | Performed by: PSYCHIATRY & NEUROLOGY

## 2023-06-27 PROCEDURE — 94761 N-INVAS EAR/PLS OXIMETRY MLT: CPT

## 2023-06-27 PROCEDURE — 25000003 PHARM REV CODE 250: Performed by: STUDENT IN AN ORGANIZED HEALTH CARE EDUCATION/TRAINING PROGRAM

## 2023-06-27 PROCEDURE — 84100 ASSAY OF PHOSPHORUS: CPT | Performed by: PHYSICIAN ASSISTANT

## 2023-06-27 PROCEDURE — 63600175 PHARM REV CODE 636 W HCPCS: Performed by: NURSE PRACTITIONER

## 2023-06-27 PROCEDURE — 25000003 PHARM REV CODE 250

## 2023-06-27 PROCEDURE — 80053 COMPREHEN METABOLIC PANEL: CPT | Performed by: PHYSICIAN ASSISTANT

## 2023-06-27 PROCEDURE — 25000003 PHARM REV CODE 250: Performed by: PHYSICIAN ASSISTANT

## 2023-06-27 PROCEDURE — 25000003 PHARM REV CODE 250: Performed by: INTERNAL MEDICINE

## 2023-06-27 PROCEDURE — 11000001 HC ACUTE MED/SURG PRIVATE ROOM

## 2023-06-27 PROCEDURE — 36415 COLL VENOUS BLD VENIPUNCTURE: CPT | Performed by: PHYSICIAN ASSISTANT

## 2023-06-27 PROCEDURE — 83735 ASSAY OF MAGNESIUM: CPT | Performed by: PHYSICIAN ASSISTANT

## 2023-06-27 RX ORDER — FUROSEMIDE 40 MG/1
40 TABLET ORAL DAILY
Status: DISCONTINUED | OUTPATIENT
Start: 2023-06-27 | End: 2023-06-27

## 2023-06-27 RX ORDER — CIPROFLOXACIN 500 MG/1
500 TABLET ORAL EVERY 12 HOURS
Status: DISCONTINUED | OUTPATIENT
Start: 2023-06-27 | End: 2023-06-28 | Stop reason: HOSPADM

## 2023-06-27 RX ORDER — FUROSEMIDE 40 MG/1
40 TABLET ORAL 2 TIMES DAILY
Status: DISCONTINUED | OUTPATIENT
Start: 2023-06-27 | End: 2023-06-28 | Stop reason: HOSPADM

## 2023-06-27 RX ADMIN — SENNOSIDES AND DOCUSATE SODIUM 1 TABLET: 50; 8.6 TABLET ORAL at 08:06

## 2023-06-27 RX ADMIN — FUROSEMIDE 40 MG: 10 INJECTION, SOLUTION INTRAMUSCULAR; INTRAVENOUS at 08:06

## 2023-06-27 RX ADMIN — LEVETIRACETAM 500 MG: 500 TABLET, FILM COATED ORAL at 09:06

## 2023-06-27 RX ADMIN — FOLIC ACID 1 MG: 1 TABLET ORAL at 08:06

## 2023-06-27 RX ADMIN — LEVETIRACETAM 500 MG: 500 TABLET, FILM COATED ORAL at 08:06

## 2023-06-27 RX ADMIN — TIOTROPIUM BROMIDE INHALATION SPRAY 2 PUFF: 3.12 SPRAY, METERED RESPIRATORY (INHALATION) at 08:06

## 2023-06-27 RX ADMIN — APIXABAN 5 MG: 5 TABLET, FILM COATED ORAL at 08:06

## 2023-06-27 RX ADMIN — THERA TABS 1 TABLET: TAB at 08:06

## 2023-06-27 RX ADMIN — CIPROFLOXACIN 500 MG: 500 TABLET, FILM COATED ORAL at 09:06

## 2023-06-27 RX ADMIN — Medication 100 MG: at 08:06

## 2023-06-27 RX ADMIN — Medication 6 MG: at 08:06

## 2023-06-27 RX ADMIN — FUROSEMIDE 40 MG: 40 TABLET ORAL at 05:06

## 2023-06-27 RX ADMIN — CIPROFLOXACIN 500 MG: 500 TABLET, FILM COATED ORAL at 12:06

## 2023-06-27 RX ADMIN — ATORVASTATIN CALCIUM 40 MG: 40 TABLET, FILM COATED ORAL at 08:06

## 2023-06-27 RX ADMIN — METOPROLOL SUCCINATE 12.5 MG: 25 TABLET, EXTENDED RELEASE ORAL at 08:06

## 2023-06-27 RX ADMIN — POLYETHYLENE GLYCOL 3350 17 G: 17 POWDER, FOR SOLUTION ORAL at 08:06

## 2023-06-27 NOTE — PROGRESS NOTES
[facility] - Neurosurgery ([facility])  Vascular Neurology  Comprehensive Stroke Center  Progress Note    Assessment/Plan:     * Embolic stroke involving right middle cerebral artery  72 year old woman transferred from OSH for imaging evaluation by Neurosurgery due to concern for mass vs hematoma complicated by persistently elevated troponin on arrival requiring admission to CCU for diagnostic left heart cath. Further imaging notable for right frontal infarction with hemorrhagic transformation; likely acute in timeline. NIHSS 4; likely pseudo elevated as patient examined immediately following procedure. No focal deficits noted. Remained in CCU overnight 6/22 and was transferred to  primary service on 6/23am. MRI brain W WO contrast with moderate sized R frontal infarct with hemorrhagic conversion and without evidence of underlying mass. CTA completed overnight which showed stable hemorrhagic infarct with no LVO/critical stenosis and noted incidental 4mm R ICA aneurysm.   Suspect hemorrhagic stroke etiology to be cardio-embolic vs large vessel atherosclerotic disease.    NAEO. Patient was AAOx2, pleasant, not in any distress. Concern for aspiration from day prior, pending MBSS on 6/28.       Antithrombotics for secondary stroke prevention: Eliquis 5mg BID    Statins for secondary stroke prevention and HLD, if present: Statins: Atorvastatin- 40 mg daily    Aggressive risk factor modification: Smoking, Diet, Exercise, CAD     Rehab efforts: The patient has been evaluated by a stroke team provider and the therapy needs have been fully considered based off the presenting complaints and exam findings. The following therapy evaluations are needed: PT evaluate and treat, OT evaluate and treat, SLP evaluate and treat     Currently NPO except for medications crushed in puree and puree for pleasure. Pending MBSS on 6/28     Diagnostics ordered/pending: None     VTE prophylaxis: Mechanical prophylaxis: Place SCDs  None: Reason  for No Pharmacological VTE Prophylaxis: Currently on anticoagulation    BP parameters:  SBP <160    Alcohol abuse, daily use  -Stroke risk factor.  Patient self reports drinking 1-2 beers daily.  Last drink 1 month ago.  -Encourage continued cessation.      E coli bacteremia  E. Coli bacteremia. Initially on Cefepime and then transitioned to Rocephin  In anticipation of discharge to [facility] on 6/28 will switch to oral Cipro  Needs 14 days total abx, currently on D6.     Takotsubo cardiomyopathy  Cardiology consulted on arrival and initially admitted to CICU for further workup of trop elevation with acute EF drop and apical ballooning on bedside echo    Recs as per 6/22 progress note:  WMA consistent with typical Takotsubo cardiomyopathy  [facility] with nonobstructive disease  - Supportive care  - Beta blockade when able, resting HR currently low  - Elevated LVEDP on [facility]  - Lasix 40 IV BID    6/27 previously on Lasix 40 IV BID, now approaching euvolemia, transited to Lasix 40 PO BID     Smoker  Stroke risk factor  Continue to encourage cessation when the patient is able to engage  Nicotine patch PRN    Chronic obstructive pulmonary disease  History of, on albuterol PRN at home per chart  Pulmonology curbsided , appreciate recs:  --scheduled duonebs q4h while awake x 4 doses  --budesonide stopped, started spiriva 2 puffs daily (continue at dc)  --consider prednisone 40mg for 5 days unless any contraindications from cardiology or neuro perspectives  --refer to pulmonology for outpatient follow up as she will need PFTs for further evaluation of her suspected obstructive lung disease    Continue daily spiriva, duonebs q4hrs while awake  Supplemental O2 PRN to maintain SpO2 goal >88%       06/23/2023 NAEO, neuro exam stable and nonfocal. NIHSS 0. CTA overnight with stable hemorrhagic conversion and no LVO/high grade stenosis. Eliquis started for stroke prevention due to EF 18%. Cefepime switched to ceftriaxone for e coli  bacteremia. Pulmonology curbsided for recs due to history of COPD, appreciate assistance. SLP consulted for swallow eval. Dispo recs for IPR, placement pending.  6/24/2023 Low urine output overnight.  30 cc on bladder scan.  Patient has EF 18% on last TTE.  Cardiology saw patient on 6/22 s/p [facility] and recommended Lasix 40 mg BID, will start.  Patient restless overnight.  She has a self-reported history of drinking 1-2 beers a day.  Last drink 1 month ago.  06/25/2023 NAEO, neuro exam stable. Restlessness improved today, restraints removed. Reports breathing improved with spiriva, will continue at discharge. Pending IPR placement.  06/26/2023 NAEO. This morning was more lethargic and difficult to arouse but still able to move all extremities without any obvious focal deficits, repeat CTH stable. Also notified by RN of concern for aspiration when patient was eating breakfast. Respiratory called to bedside for breathing treatment due to coughing, PRN racepinepherine ordered for audible wheezing and concern for stridor. Awaiting IPR placement, and patient must remain out of restraints x24hrs.            STROKE DOCUMENTATION   Acute Stroke Times   Unknown Normal Date: Unknown Date  Unknown Normal Time: Unknown Time  Unknown Symptom Onset Date: Unknown Date  Unknown Symptom Onset Time: Unknown Time  Thrombolytic Therapy Recommended: No  Thrombectomy Recommended: No     NIH Scale:  1a. Level of Consciousness: 0-->Alert, keenly responsive  1b. LOC Questions: 0-->Answers both questions correctly  1c. LOC Commands: 0-->Performs both tasks correctly  2. Best Gaze: 0-->Normal  3. Visual: 0-->No visual loss  4. Facial Palsy: 0-->Normal symmetrical movements  5a. Motor Arm, Left: 0-->No drift, limb holds 90 (or 45) degrees for full 10 secs  5b. Motor Arm, Right: 0-->No drift, limb holds 90 (or 45) degrees for full 10 secs  6a. Motor Leg, Left: 0-->No drift, leg holds 30 degree position for full 5 secs  6b. Motor Leg, Right: 0-->No  drift, leg holds 30 degree position for full 5 secs  7. Limb Ataxia: 0-->Absent  8. Sensory: 0-->Normal, no sensory loss  9. Best Language: 0-->No aphasia, normal  10. Dysarthria: 0-->Normal  11. Extinction and Inattention (formerly Neglect): 0-->No abnormality  Total (NIH Stroke Scale): 0         Modified [place]    [place] Coma Scale:    ABCD2 Score:    [id number] Score:   HAS -BLED Score:   ICH Score:   Hunt & Mckeon Classification:       Hemorrhagic change of an Ischemic Stroke: Does this patient have an ischemic stroke with hemorrhagic changes? No       Neurologic Chief Complaint: Hemorrhagic conversion of R MCA infarct    Subjective:     Interval History: Patient is seen for follow-up neurological assessment and treatment recommendations:     No acute distress. Slept well. AAOx3, appears pleasant. Concern for aspiration from day prior, MBSS pending for 6/28.     HPI, Past Medical, Family, and Social History remains the same as documented in the initial encounter.  Review of Systems   Constitutional:  Positive for activity change. Negative for fatigue.   HENT:  Negative for drooling and trouble swallowing.    Eyes:  Negative for visual disturbance.   Respiratory:  Positive for shortness of breath (improved).    Cardiovascular:  Negative for palpitations.   Gastrointestinal:  Negative for nausea and vomiting.   Musculoskeletal:  Negative for neck stiffness.   Skin:  Negative for color change.   Neurological:  Negative for speech difficulty, weakness and headaches.   Psychiatric/Behavioral:  Negative for confusion.    All other systems reviewed and are negative.  Scheduled Meds:   apixaban  5 mg Oral BID    atorvastatin  40 mg Oral Daily    ciprofloxacin HCl  500 mg Oral Q12H    folic acid  1 mg Oral Daily    furosemide  40 mg Oral BID    levETIRAcetam  500 mg Oral BID    melatonin  6 mg Oral Nightly    metoprolol succinate  12.5 mg Oral Daily    multivitamin  1 tablet Oral Daily    polyethylene glycol   17 g Oral Daily    senna-docusate 8.6-50 mg  1 tablet Oral BID    thiamine  100 mg Oral Daily    tiotropium bromide  2 puff Inhalation Daily     Continuous Infusions:  PRN Meds:acetaminophen, albuterol sulfate, benzonatate, dextrose 10%, dextrose 10%, dextrose, dextrose, glucagon (human recombinant), naloxone, nicotine, ondansetron, racepinephrine, sodium chloride 0.9%, sodium chloride 0.9%    Objective:     Vital Signs (Most Recent):  Temp: 98.4 °F (36.9 °C) (06/27/23 0725)  Pulse: 72 (06/27/23 1141)  Resp: 18 (06/27/23 0840)  BP: 115/72 (06/27/23 0853)  SpO2: 95 % (06/27/23 0840)  BP Location: Right arm    Vital Signs Range (Last 24H):  Temp:  [97.5 °F (36.4 °C)-99.1 °F (37.3 °C)]   Pulse:  [55-73]   Resp:  [16-20]   BP: (110-125)/(60-77)   SpO2:  [95 %-99 %]   BP Location: Right arm       Physical Exam  Vitals and nursing note reviewed.   Constitutional:       General: She is not in acute distress.  HENT:      Head: Normocephalic.      Nose: Nose normal.      Mouth/Throat:      Pharynx: No oropharyngeal exudate or posterior oropharyngeal erythema.   Eyes:      Extraocular Movements: Extraocular movements intact.      Conjunctiva/sclera: Conjunctivae normal.   Cardiovascular:      Rate and Rhythm: Normal rate.   Pulmonary:      Effort: Pulmonary effort is normal. No respiratory distress.   Abdominal:      General: There is no distension.   Musculoskeletal:         General: No deformity or signs of injury.      Cervical back: Normal range of motion. No rigidity.   Skin:     General: Skin is warm and dry.   Neurological:      Mental Status: She is alert and oriented to person, place, and time.      Cranial Nerves: No dysarthria or facial asymmetry.      Sensory: Sensation is intact.      Motor: No weakness, tremor or seizure activity.      Coordination: Coordination is intact.   Psychiatric:         Attention and Perception: Attention normal.         Behavior: Behavior normal. Behavior is cooperative.          Neurological Exam:   LOC: alert  Attention Span: Good   Language: No aphasia  Articulation: No dysarthria  Orientation: Person, Place, Time   Visual Fields: Full  EOM (CN III, IV, VI): Full/intact  Facial Sensation (CN V): Normal  Facial Movement (CN VII): Symmetric facial expression    Motor: LUE Normal 5/5, LLE Normal 5/5, RUE Normal 5/5, RLE Normal 5/5  Sensation: Intact to light touch    Laboratory:  CMP:   Recent Labs   Lab 06/27/23  0535   CALCIUM 9.6   ALBUMIN 3.3*   PROT 7.6      K 3.5   CO2 26   CL 97   BUN 19   CREATININE 0.7   ALKPHOS 64   *   *   BILITOT 1.0     BMP:   Recent Labs   Lab 06/27/23  0535      K 3.5   CL 97   CO2 26   BUN 19   CREATININE 0.7   CALCIUM 9.6     CBC:   Recent Labs   Lab 06/27/23  0535   WBC 9.58   RBC 4.03   HGB 14.6   HCT 43.3      *   MCH 36.2*   MCHC 33.7     Lipid Panel:   Recent Labs   Lab 06/23/23  0359   CHOL 128   LDLCALC 65.6   HDL 38*   TRIG 122     Hgb A1C:   Recent Labs   Lab 06/23/23  0359   HGBA1C 4.6          Diagnostic Results      Brain Imaging   MRI brain W WO contrast 6/22/2023  Moderate sized right frontal hemorrhagic infarction, similar to prior CT.  No underlying mass lesions appreciated.  Follow up examination as clinically indicated.      CTH without contrast 6/21/2023  Hyperdense mass right posterior frontal region could represent a hematoma.  This is probably most likely.  A brain neoplasm is thought to be less likely.  For further evaluation MRI of the brain without with contrast is recommended.        Vessel Imaging   CTA head/neck 6/23/2023  Right frontal hemorrhagic infarct again identified with mild surrounding edema and localized mass effect.  No midline shift.   CTA shows no high-grade stenosis or large vessel occlusion.  No vascular malformation underlying the right the frontal hemorrhage.   Incidental note is made of a 4 mm right ICA aneurysm.  Recommend follow-up to ensure long-term stability.  This  can likely be followed with MRA.   Centrilobular emphysematous changes at the lung apices.      Cardiac Imaging   TTE 6/22/2023   The left ventricle is normal in size with mild eccentric hypertrophy and severely decreased systolic function.   The estimated ejection fraction is 18%.   There are segmental left ventricular wall motion abnormalities.   Grade I left ventricular diastolic dysfunction.   Normal right ventricular size with normal right ventricular systolic function.   Elevated central venous pressure (15 mmHg).   Trivial posterior pericardial effusion. Just at the base.   Compared with 10/27/2020, marked worsening has occured.      Gali Bennett MD  Comprehensive Stroke Center  Department of Vascular Neurology   [facility])

## 2023-06-27 NOTE — ASSESSMENT & PLAN NOTE
72 year old woman transferred from OSH for imaging evaluation by Neurosurgery due to concern for mass vs hematoma complicated by persistently elevated troponin on arrival requiring admission to CCU for diagnostic left heart cath. Further imaging notable for right frontal infarction with hemorrhagic transformation; likely acute in timeline. NIHSS 4; likely pseudo elevated as patient examined immediately following procedure. No focal deficits noted. Remained in CCU overnight 6/22 and was transferred to  primary service on 6/23am. MRI brain W WO contrast with moderate sized R frontal infarct with hemorrhagic conversion and without evidence of underlying mass. CTA completed overnight which showed stable hemorrhagic infarct with no LVO/critical stenosis and noted incidental 4mm R ICA aneurysm.   Suspect hemorrhagic stroke etiology to be cardio-embolic vs large vessel atherosclerotic disease.    NAEO. Patient was AAOx2, pleasant, not in any distress. Concern for aspiration from day prior, pending MBSS on 6/28.       Antithrombotics for secondary stroke prevention: Eliquis 5mg BID    Statins for secondary stroke prevention and HLD, if present: Statins: Atorvastatin- 40 mg daily    Aggressive risk factor modification: Smoking, Diet, Exercise, CAD     Rehab efforts: The patient has been evaluated by a stroke team provider and the therapy needs have been fully considered based off the presenting complaints and exam findings. The following therapy evaluations are needed: PT evaluate and treat, OT evaluate and treat, SLP evaluate and treat     Currently NPO except for medications crushed in puree and puree for pleasure. Pending MBSS on 6/28     Diagnostics ordered/pending: None     VTE prophylaxis: Mechanical prophylaxis: Place SCDs  None: Reason for No Pharmacological VTE Prophylaxis: Currently on anticoagulation    BP parameters:  SBP <160

## 2023-06-27 NOTE — SUBJECTIVE & OBJECTIVE
STROKE DOCUMENTATION   Acute Stroke Times   Unknown Normal Date: Unknown Date  Unknown Normal Time: Unknown Time  Unknown Symptom Onset Date: Unknown Date  Unknown Symptom Onset Time: Unknown Time  Thrombolytic Therapy Recommended: No  Thrombectomy Recommended: No     NIH Scale:  1a. Level of Consciousness: 0-->Alert, keenly responsive  1b. LOC Questions: 0-->Answers both questions correctly  1c. LOC Commands: 0-->Performs both tasks correctly  2. Best Gaze: 0-->Normal  3. Visual: 0-->No visual loss  4. Facial Palsy: 0-->Normal symmetrical movements  5a. Motor Arm, Left: 0-->No drift, limb holds 90 (or 45) degrees for full 10 secs  5b. Motor Arm, Right: 0-->No drift, limb holds 90 (or 45) degrees for full 10 secs  6a. Motor Leg, Left: 0-->No drift, leg holds 30 degree position for full 5 secs  6b. Motor Leg, Right: 0-->No drift, leg holds 30 degree position for full 5 secs  7. Limb Ataxia: 0-->Absent  8. Sensory: 0-->Normal, no sensory loss  9. Best Language: 0-->No aphasia, normal  10. Dysarthria: 0-->Normal  11. Extinction and Inattention (formerly Neglect): 0-->No abnormality  Total (NIH Stroke Scale): 0         Modified [place]    [place] Coma Scale:    ABCD2 Score:    [id number] Score:   HAS -BLED Score:   ICH Score:   Hunt & Mckeon Classification:       Hemorrhagic change of an Ischemic Stroke: Does this patient have an ischemic stroke with hemorrhagic changes? No       Neurologic Chief Complaint: Hemorrhagic conversion of R MCA infarct    Subjective:     Interval History: Patient is seen for follow-up neurological assessment and treatment recommendations:     No acute distress. Slept well. AAOx3, appears pleasant. Concern for aspiration from day prior, MBSS pending for 6/28.     HPI, Past Medical, Family, and Social History remains the same as documented in the initial encounter.  Review of Systems   Constitutional:  Positive for activity change. Negative for fatigue.   HENT:  Negative for drooling and trouble  swallowing.    Eyes:  Negative for visual disturbance.   Respiratory:  Positive for shortness of breath (improved).    Cardiovascular:  Negative for palpitations.   Gastrointestinal:  Negative for nausea and vomiting.   Musculoskeletal:  Negative for neck stiffness.   Skin:  Negative for color change.   Neurological:  Negative for speech difficulty, weakness and headaches.   Psychiatric/Behavioral:  Negative for confusion.    All other systems reviewed and are negative.  Scheduled Meds:   apixaban  5 mg Oral BID    atorvastatin  40 mg Oral Daily    ciprofloxacin HCl  500 mg Oral Q12H    folic acid  1 mg Oral Daily    furosemide  40 mg Oral BID    levETIRAcetam  500 mg Oral BID    melatonin  6 mg Oral Nightly    metoprolol succinate  12.5 mg Oral Daily    multivitamin  1 tablet Oral Daily    polyethylene glycol  17 g Oral Daily    senna-docusate 8.6-50 mg  1 tablet Oral BID    thiamine  100 mg Oral Daily    tiotropium bromide  2 puff Inhalation Daily     Continuous Infusions:  PRN Meds:acetaminophen, albuterol sulfate, benzonatate, dextrose 10%, dextrose 10%, dextrose, dextrose, glucagon (human recombinant), naloxone, nicotine, ondansetron, racepinephrine, sodium chloride 0.9%, sodium chloride 0.9%    Objective:     Vital Signs (Most Recent):  Temp: 98.4 °F (36.9 °C) (06/27/23 0725)  Pulse: 72 (06/27/23 1141)  Resp: 18 (06/27/23 0840)  BP: 115/72 (06/27/23 0853)  SpO2: 95 % (06/27/23 0840)  BP Location: Right arm    Vital Signs Range (Last 24H):  Temp:  [97.5 °F (36.4 °C)-99.1 °F (37.3 °C)]   Pulse:  [55-73]   Resp:  [16-20]   BP: (110-125)/(60-77)   SpO2:  [95 %-99 %]   BP Location: Right arm       Physical Exam  Vitals and nursing note reviewed.   Constitutional:       General: She is not in acute distress.  HENT:      Head: Normocephalic.      Nose: Nose normal.      Mouth/Throat:      Pharynx: No oropharyngeal exudate or posterior oropharyngeal erythema.   Eyes:      Extraocular Movements: Extraocular movements  intact.      Conjunctiva/sclera: Conjunctivae normal.   Cardiovascular:      Rate and Rhythm: Normal rate.   Pulmonary:      Effort: Pulmonary effort is normal. No respiratory distress.   Abdominal:      General: There is no distension.   Musculoskeletal:         General: No deformity or signs of injury.      Cervical back: Normal range of motion. No rigidity.   Skin:     General: Skin is warm and dry.   Neurological:      Mental Status: She is alert and oriented to person, place, and time.      Cranial Nerves: No dysarthria or facial asymmetry.      Sensory: Sensation is intact.      Motor: No weakness, tremor or seizure activity.      Coordination: Coordination is intact.   Psychiatric:         Attention and Perception: Attention normal.         Behavior: Behavior normal. Behavior is cooperative.         Neurological Exam:   LOC: alert  Attention Span: Good   Language: No aphasia  Articulation: No dysarthria  Orientation: Person, Place, Time   Visual Fields: Full  EOM (CN III, IV, VI): Full/intact  Facial Sensation (CN V): Normal  Facial Movement (CN VII): Symmetric facial expression    Motor: LUE Normal 5/5, LLE Normal 5/5, RUE Normal 5/5, RLE Normal 5/5  Sensation: Intact to light touch    Laboratory:  CMP:   Recent Labs   Lab 06/27/23  0535   CALCIUM 9.6   ALBUMIN 3.3*   PROT 7.6      K 3.5   CO2 26   CL 97   BUN 19   CREATININE 0.7   ALKPHOS 64   *   *   BILITOT 1.0     BMP:   Recent Labs   Lab 06/27/23  0535      K 3.5   CL 97   CO2 26   BUN 19   CREATININE 0.7   CALCIUM 9.6     CBC:   Recent Labs   Lab 06/27/23  0535   WBC 9.58   RBC 4.03   HGB 14.6   HCT 43.3      *   MCH 36.2*   MCHC 33.7     Lipid Panel:   Recent Labs   Lab 06/23/23  0359   CHOL 128   LDLCALC 65.6   HDL 38*   TRIG 122     Hgb A1C:   Recent Labs   Lab 06/23/23  0359   HGBA1C 4.6          Diagnostic Results      Brain Imaging   MRI brain W WO contrast 6/22/2023  Moderate sized right frontal  hemorrhagic infarction, similar to prior CT.  No underlying mass lesions appreciated.  Follow up examination as clinically indicated.      CTH without contrast 6/21/2023  Hyperdense mass right posterior frontal region could represent a hematoma.  This is probably most likely.  A brain neoplasm is thought to be less likely.  For further evaluation MRI of the brain without with contrast is recommended.        Vessel Imaging   CTA head/neck 6/23/2023  Right frontal hemorrhagic infarct again identified with mild surrounding edema and localized mass effect.  No midline shift.   CTA shows no high-grade stenosis or large vessel occlusion.  No vascular malformation underlying the right the frontal hemorrhage.   Incidental note is made of a 4 mm right ICA aneurysm.  Recommend follow-up to ensure long-term stability.  This can likely be followed with MRA.   Centrilobular emphysematous changes at the lung apices.      Cardiac Imaging   TTE 6/22/2023  The left ventricle is normal in size with mild eccentric hypertrophy and severely decreased systolic function.  The estimated ejection fraction is 18%.  There are segmental left ventricular wall motion abnormalities.  Grade I left ventricular diastolic dysfunction.  Normal right ventricular size with normal right ventricular systolic function.  Elevated central venous pressure (15 mmHg).  Trivial posterior pericardial effusion. Just at the base.  Compared with 10/27/2020, marked worsening has occured.

## 2023-06-27 NOTE — PLAN OF CARE
Pt aaox3. Resp even and unlabored on ra with no distress noted. Bs with wheezing noted. #20 right wrist piv flushing without redness edema or pain noted. Pt has purewick with yellow urine noted. Pt denies complaint. No confusion overnight. Accu check was 100. Tele with NSR. Bed low position with sr up and call light in reach.

## 2023-06-27 NOTE — PLAN OF CARE
06/27/23 1248   Post-Acute Status   Post-Acute Authorization Placement   Post-Acute Placement Status Set-up Complete/Auth obtained   Coverage PHN   Discharge Delays (!) Change in Medical Condition   Discharge Plan   Discharge Plan A Rehab     Patient accepted at [facility] and auth obtained.  However, patient has been having issues with her diet and requiring re-eval and possible MBBS tomorrow.  She also had a camera monitor in her room till this morning.  It has now been removed.  Patient will be monitored till tomorrow for any ST issues and will d/c to [facility].      Maria T Romero, CHARLIE  [facility]  [phone]

## 2023-06-27 NOTE — PT/OT/SLP PROGRESS
"Occupational Therapy   Treatment    Name: Svetlana Beck  MRN: [id number]  Admitting Diagnosis:  Embolic stroke involving right middle cerebral artery  5 Days Post-Op    Recommendations:     Discharge Recommendations: rehabilitation facility  Discharge Equipment Recommendations:  to be determined by next level of care  Barriers to discharge:  Other (Comment) (increased assistance required)    Assessment:     Svetlana Beck is a 72 y.o. female with a medical diagnosis of Embolic stroke involving right middle cerebral artery.  She presents with the following performance deficits affecting function:  weakness, impaired endurance, impaired self care skills, impaired functional mobility, gait instability, impaired balance, decreased upper extremity function, decreased lower extremity function, decreased safety awareness.     Pt agreeable to therapy and tolerated the session well this date. Pt ambulated into the bathroom with CGA and RW to perform oral care and grooming in standing at the sink. Pt performed hygiene tasks with CGA. Pt then left up in the chair. Pt would benefit from continued skilled acute OT services in order to maximize independence and safety with ADLs and functional mobility to ensure safe return to PLOF in the least restrictive environment. OT recommending Rehab once pt is medically appropriate for d/c.     Rehab Prognosis:  Good; patient would benefit from acute skilled OT services to address these deficits and reach maximum level of function.       Plan:     Patient to be seen 4 x/week to address the above listed problems via self-care/home management, therapeutic activities, therapeutic exercises, neuromuscular re-education  Plan of Care Expires: 07/22/23  Plan of Care Reviewed with: patient    Subjective     "I feel good today"     Chief Complaint: none stated   Patient/Family Comments/goals: To return to PLOF  Pain/Comfort:  Pain Rating 1: 0/10    Objective:     Communicated with: RN prior to " session.  Patient found HOB elevated with BRIA Whitley upon OT entry to room.    General Precautions: Standard, fall, aspiration    Orthopedic Precautions:N/A  Braces: N/A  Respiratory Status: Room air     Occupational Performance:     Bed Mobility:    Patient completed Rolling/Turning to Left with  stand by assistance  Patient completed Scooting/Bridging with stand by assistance  Patient completed Supine to Sit with stand by assistance     Functional Mobility/Transfers:  Patient completed Sit <> Stand Transfer with contact guard assistance  with  rolling walker   Functional Mobility: Pt engaging in functional mobility to simulate household/community distances approx 20 ft with CGA and utilizing RW in order to maximize functional activity tolerance and standing balance required for engagement in occupations of choice.    Activities of Daily Living:  Grooming: contact guard assistance : To perform oral care and to wash her face in standing at the sink for ~ 3 minutes     [facility] 6 Click ADL: 20    Treatment & Education:  Therapist provided facilitation and instruction of proper body mechanics and fall prevention strategies during tasks listed above.  Instructed patient to sit in bedside chair daily to increase OOB/activity tolerance.  Instructed patient to use call light to have nursing staff assist with needs/transfers.  Discussed OT POC and answered all questions within OT scope of practice.  Whiteboard updated       Patient left up in chair with all lines intact, call button in reach, and chair alarm on    GOALS:   Multidisciplinary Problems       Occupational Therapy Goals          Problem: Occupational Therapy    Goal Priority Disciplines Outcome Interventions   Occupational Therapy Goal     OT, PT/OT Ongoing, Progressing    Description: Goals to be met by: 7/20/23     Patient will increase functional independence with ADLs by performing:    UE Dressing with Modified [place].  LE Dressing with Stand-by  Assistance.  Grooming while standing with Stand-by Assistance.  Toileting from toilet/bedside commode with Stand-by Assistance for hygiene and clothing management.   Toilet transfer to toilet/bedside commode with Supervision and LRAD as needed.                         Time Tracking:     OT Date of Treatment: 06/27/23  OT Start Time: 0920  OT Stop Time: 0939  OT Total Time (min): 19 min    Billable Minutes:Self Care/Home Management 19    OT/BARB: OT          6/27/2023

## 2023-06-27 NOTE — ASSESSMENT & PLAN NOTE
Cardiology consulted on arrival and initially admitted to CICU for further workup of trop elevation with acute EF drop and apical ballooning on bedside echo    Recs as per 6/22 progress note:  WMA consistent with typical Takotsubo cardiomyopathy  [facility] with nonobstructive disease  - Supportive care  - Beta blockade when able, resting HR currently low  - Elevated LVEDP on [facility]  - Lasix 40 IV BID    6/27 previously on Lasix 40 IV BID, now approaching euvolemia, transited to Lasix 40 PO BID

## 2023-06-27 NOTE — PT/OT/SLP PROGRESS
"Speech Language Pathology Treatment    Patient Name:  Svetlana Beck   MRN:  [id number]  Admitting Diagnosis: Embolic stroke involving right middle cerebral artery    Recommendations:                 General Recommendations:  Dysphagia therapy  Diet recommendations:  Minced & Moist Diet - IDDSI Level 5, Liquid Diet Level: Thin liquids - IDDSI Level 0   Aspiration Precautions: 1 bite/sip at a time, Alternating bites/sips, Avoid talking while eating, Eliminate distractions, Feed only when awake/alert, HOB to 90 degrees, Meds whole buried in puree, No straws, Small bites/sips, and Strict aspiration precautions   General Precautions: Standard, aspiration, fall  Communication strategies:  go to room if call light pushed    Assessment:     Svetlana Beck is a 72 y.o. female with an SLP diagnosis of Dysphagia.      Subjective     "I seem to be swallowing better today"     Nurse reports pt tolerating whole meds given 1 at a time this am.  Modified barium swallow study unable to be completed 2/2 scheduling conflict with radiology.  Modified barium scheduled for tomorrow am.     Pain/Comfort:  Pain Rating 1: 0/10  Pain Rating Post-Intervention 1: 0/10    Respiratory Status: Room air    Objective:     Has the patient been evaluated by SLP for swallowing?   Yes  Keep patient NPO? No   Current Respiratory Status:        Pt seen sitting upright in chair at bedside, she reports improved tolerance of meds with thin liquids today.  Vocal quality was clear.  Clear breath sounds also noted.  No dysarthria evident.  Pt reports h/o globus sensation with medications and hard/solid foods.  Pt is edentulous and does not wear dentures.  Thin liquids self presented via cup with cyclical sips taken and immediate throat clear x2,.  Education provided re: importance of small, single sips.  Tp nabeel to implement strategies for additional 4 oz without overt s/s aspiration.  Oral transit and clearance with puree WFL.  No overt s/s aspiration.  " Prolonged mastication of bite size soft solid with expectoration of harder fruits.  Adequate mastication and clearance of softened clarice cracker.  No overt s/s aspiration.  Education provided on role of SLP, diet recs, s/s aspiration, risk associated with aspiration, increased aspiration risk given recent choking episode, importance of implementation of swallow precs, modified barium swallow study as needed if difficulty again noted, and POC.  Pt verbalized understanding.  Precautions and strategies written on white board in room.  Pt able to read precautions from chair.    Goals:   Multidisciplinary Problems       SLP Goals          Problem: SLP    Goal Priority Disciplines Outcome   SLP Goal     SLP    Description: Speech Language Pathology Goals  Goals expected to be met by 7/1:  1. Pt will tolerate least restrictive diet and thin liquids without s/s of aspiration.                                Plan:     Patient to be seen:  3 x/week   Plan of Care expires:  07/23/23  Plan of Care reviewed with:  patient   SLP Follow-Up:  Yes       Discharge recommendations:  rehabilitation facility   Barriers to Discharge:  Level of Skilled Assistance Needed      Time Tracking:     SLP Treatment Date:   06/27/23  Speech Start Time:  0940  Speech Stop Time:  1002     Speech Total Time (min):  22 min    Billable Minutes: Treatment Swallowing Dysfunction 12 and Self Care/Home Management Training 10    06/27/2023

## 2023-06-27 NOTE — ASSESSMENT & PLAN NOTE
E. Coli bacteremia. Initially on Cefepime and then transitioned to Rocephin  In anticipation of discharge to [facility] on 6/28 will switch to oral Cipro  Needs 14 days total abx, currently on D6.

## 2023-06-28 VITALS
HEIGHT: 64 IN | OXYGEN SATURATION: 96 % | SYSTOLIC BLOOD PRESSURE: 104 MMHG | HEART RATE: 60 BPM | DIASTOLIC BLOOD PRESSURE: 58 MMHG | TEMPERATURE: 98 F | RESPIRATION RATE: 18 BRPM | BODY MASS INDEX: 25.97 KG/M2 | WEIGHT: 152.13 LBS

## 2023-06-28 LAB
ALBUMIN SERPL BCP-MCNC: 3 G/DL (ref 3.5–5.2)
ALP SERPL-CCNC: 84 U/L (ref 55–135)
ALT SERPL W/O P-5'-P-CCNC: 122 U/L (ref 10–44)
ANION GAP SERPL CALC-SCNC: 13 MMOL/L (ref 8–16)
AST SERPL-CCNC: 83 U/L (ref 10–40)
BACTERIA BLD CULT: NORMAL
BACTERIA BLD CULT: NORMAL
BASOPHILS # BLD AUTO: 0.04 K/UL (ref 0–0.2)
BASOPHILS NFR BLD: 0.5 % (ref 0–1.9)
BILIRUB SERPL-MCNC: 0.6 MG/DL (ref 0.1–1)
BUN SERPL-MCNC: 17 MG/DL (ref 8–23)
CALCIUM SERPL-MCNC: 9.1 MG/DL (ref 8.7–10.5)
CHLORIDE SERPL-SCNC: 95 MMOL/L (ref 95–110)
CO2 SERPL-SCNC: 27 MMOL/L (ref 23–29)
CREAT SERPL-MCNC: 0.8 MG/DL (ref 0.5–1.4)
DIFFERENTIAL METHOD: ABNORMAL
EOSINOPHIL # BLD AUTO: 0.4 K/UL (ref 0–0.5)
EOSINOPHIL NFR BLD: 4.4 % (ref 0–8)
ERYTHROCYTE [DISTWIDTH] IN BLOOD BY AUTOMATED COUNT: 10.8 % (ref 11.5–14.5)
EST. GFR  (NO RACE VARIABLE): >60 ML/MIN/1.73 M^2
GLUCOSE SERPL-MCNC: 88 MG/DL (ref 70–110)
HCT VFR BLD AUTO: 39.7 % (ref 37–48.5)
HGB BLD-MCNC: 13.5 G/DL (ref 12–16)
IMM GRANULOCYTES # BLD AUTO: 0.03 K/UL (ref 0–0.04)
IMM GRANULOCYTES NFR BLD AUTO: 0.4 % (ref 0–0.5)
LYMPHOCYTES # BLD AUTO: 1.6 K/UL (ref 1–4.8)
LYMPHOCYTES NFR BLD: 20.5 % (ref 18–48)
MAGNESIUM SERPL-MCNC: 2 MG/DL (ref 1.6–2.6)
MCH RBC QN AUTO: 36.1 PG (ref 27–31)
MCHC RBC AUTO-ENTMCNC: 34 G/DL (ref 32–36)
MCV RBC AUTO: 106 FL (ref 82–98)
MONOCYTES # BLD AUTO: 1.2 K/UL (ref 0.3–1)
MONOCYTES NFR BLD: 15.5 % (ref 4–15)
NEUTROPHILS # BLD AUTO: 4.7 K/UL (ref 1.8–7.7)
NEUTROPHILS NFR BLD: 58.7 % (ref 38–73)
NRBC BLD-RTO: 0 /100 WBC
PHOSPHATE SERPL-MCNC: 2.8 MG/DL (ref 2.7–4.5)
PLATELET # BLD AUTO: 337 K/UL (ref 150–450)
PMV BLD AUTO: 10.4 FL (ref 9.2–12.9)
POCT GLUCOSE: 104 MG/DL (ref 70–110)
POCT GLUCOSE: 107 MG/DL (ref 70–110)
POCT GLUCOSE: 139 MG/DL (ref 70–110)
POTASSIUM SERPL-SCNC: 3.1 MMOL/L (ref 3.5–5.1)
PROT SERPL-MCNC: 7.1 G/DL (ref 6–8.4)
RBC # BLD AUTO: 3.74 M/UL (ref 4–5.4)
SODIUM SERPL-SCNC: 135 MMOL/L (ref 136–145)
WBC # BLD AUTO: 7.94 K/UL (ref 3.9–12.7)

## 2023-06-28 PROCEDURE — 25000003 PHARM REV CODE 250

## 2023-06-28 PROCEDURE — 94761 N-INVAS EAR/PLS OXIMETRY MLT: CPT

## 2023-06-28 PROCEDURE — 99232 PR SUBSEQUENT HOSPITAL CARE,LEVL II: ICD-10-PCS | Mod: ,,, | Performed by: NURSE PRACTITIONER

## 2023-06-28 PROCEDURE — 97535 SELF CARE MNGMENT TRAINING: CPT

## 2023-06-28 PROCEDURE — 25000003 PHARM REV CODE 250: Performed by: NURSE PRACTITIONER

## 2023-06-28 PROCEDURE — 99233 SBSQ HOSP IP/OBS HIGH 50: CPT | Mod: ,,, | Performed by: PSYCHIATRY & NEUROLOGY

## 2023-06-28 PROCEDURE — 92526 ORAL FUNCTION THERAPY: CPT

## 2023-06-28 PROCEDURE — 25000003 PHARM REV CODE 250: Performed by: PHYSICIAN ASSISTANT

## 2023-06-28 PROCEDURE — 83735 ASSAY OF MAGNESIUM: CPT | Performed by: PHYSICIAN ASSISTANT

## 2023-06-28 PROCEDURE — 25000003 PHARM REV CODE 250: Performed by: STUDENT IN AN ORGANIZED HEALTH CARE EDUCATION/TRAINING PROGRAM

## 2023-06-28 PROCEDURE — 99232 SBSQ HOSP IP/OBS MODERATE 35: CPT | Mod: ,,, | Performed by: NURSE PRACTITIONER

## 2023-06-28 PROCEDURE — 94640 AIRWAY INHALATION TREATMENT: CPT

## 2023-06-28 PROCEDURE — 25000003 PHARM REV CODE 250: Performed by: INTERNAL MEDICINE

## 2023-06-28 PROCEDURE — 99233 PR SUBSEQUENT HOSPITAL CARE,LEVL III: ICD-10-PCS | Mod: ,,, | Performed by: PSYCHIATRY & NEUROLOGY

## 2023-06-28 PROCEDURE — 84100 ASSAY OF PHOSPHORUS: CPT | Performed by: PHYSICIAN ASSISTANT

## 2023-06-28 PROCEDURE — 80053 COMPREHEN METABOLIC PANEL: CPT | Performed by: PHYSICIAN ASSISTANT

## 2023-06-28 PROCEDURE — 85025 COMPLETE CBC W/AUTO DIFF WBC: CPT | Performed by: PHYSICIAN ASSISTANT

## 2023-06-28 PROCEDURE — 36415 COLL VENOUS BLD VENIPUNCTURE: CPT | Performed by: PHYSICIAN ASSISTANT

## 2023-06-28 RX ORDER — METOPROLOL SUCCINATE 25 MG/1
12.5 TABLET, EXTENDED RELEASE ORAL DAILY
Qty: 15 TABLET | Refills: 0
Start: 2023-06-29 | End: 2023-07-29

## 2023-06-28 RX ORDER — IBUPROFEN 200 MG
1 TABLET ORAL DAILY
Qty: 7 PATCH | Refills: 0
Start: 2023-06-28

## 2023-06-28 RX ORDER — FOLIC ACID 1 MG/1
1 TABLET ORAL DAILY
Qty: 30 TABLET | Refills: 0
Start: 2023-06-29 | End: 2023-07-29

## 2023-06-28 RX ORDER — TALC
6 POWDER (GRAM) TOPICAL NIGHTLY
Qty: 20 TABLET | Refills: 0
Start: 2023-06-28 | End: 2023-07-08

## 2023-06-28 RX ORDER — POTASSIUM CHLORIDE 20 MEQ/1
40 TABLET, EXTENDED RELEASE ORAL ONCE
Status: COMPLETED | OUTPATIENT
Start: 2023-06-28 | End: 2023-06-28

## 2023-06-28 RX ORDER — LANOLIN ALCOHOL/MO/W.PET/CERES
100 CREAM (GRAM) TOPICAL DAILY
Qty: 20 TABLET | Refills: 0
Start: 2023-06-28

## 2023-06-28 RX ORDER — ATORVASTATIN CALCIUM 40 MG/1
40 TABLET, FILM COATED ORAL DAILY
Qty: 90 TABLET | Refills: 0
Start: 2023-06-29 | End: 2023-09-27

## 2023-06-28 RX ORDER — CIPROFLOXACIN 500 MG/1
500 TABLET ORAL EVERY 12 HOURS
Qty: 14 TABLET | Refills: 0
Start: 2023-06-28 | End: 2023-07-05

## 2023-06-28 RX ORDER — LEVETIRACETAM 500 MG/1
500 TABLET ORAL 2 TIMES DAILY
Qty: 4 TABLET | Refills: 0
Start: 2023-06-28 | End: 2023-06-30

## 2023-06-28 RX ORDER — FUROSEMIDE 40 MG/1
40 TABLET ORAL 2 TIMES DAILY
Qty: 60 TABLET | Refills: 0
Start: 2023-06-28 | End: 2023-07-28

## 2023-06-28 RX ADMIN — ATORVASTATIN CALCIUM 40 MG: 40 TABLET, FILM COATED ORAL at 10:06

## 2023-06-28 RX ADMIN — TIOTROPIUM BROMIDE INHALATION SPRAY 2 PUFF: 3.12 SPRAY, METERED RESPIRATORY (INHALATION) at 07:06

## 2023-06-28 RX ADMIN — FUROSEMIDE 40 MG: 40 TABLET ORAL at 10:06

## 2023-06-28 RX ADMIN — FOLIC ACID 1 MG: 1 TABLET ORAL at 10:06

## 2023-06-28 RX ADMIN — Medication 100 MG: at 10:06

## 2023-06-28 RX ADMIN — SENNOSIDES AND DOCUSATE SODIUM 1 TABLET: 50; 8.6 TABLET ORAL at 10:06

## 2023-06-28 RX ADMIN — POTASSIUM CHLORIDE 40 MEQ: 1500 TABLET, EXTENDED RELEASE ORAL at 10:06

## 2023-06-28 RX ADMIN — CIPROFLOXACIN 500 MG: 500 TABLET, FILM COATED ORAL at 10:06

## 2023-06-28 RX ADMIN — LEVETIRACETAM 500 MG: 500 TABLET, FILM COATED ORAL at 10:06

## 2023-06-28 RX ADMIN — METOPROLOL SUCCINATE 12.5 MG: 25 TABLET, EXTENDED RELEASE ORAL at 10:06

## 2023-06-28 RX ADMIN — THERA TABS 1 TABLET: TAB at 10:06

## 2023-06-28 RX ADMIN — POLYETHYLENE GLYCOL 3350 17 G: 17 POWDER, FOR SOLUTION ORAL at 10:06

## 2023-06-28 RX ADMIN — APIXABAN 5 MG: 5 TABLET, FILM COATED ORAL at 11:06

## 2023-06-28 NOTE — PLAN OF CARE
[facility] soft diet with thin liquids recommended   Problem: SLP  Goal: SLP Goal  Description: Speech Language Pathology Goals  Goals expected to be met by 7/1:  1. Pt will tolerate least restrictive diet and thin liquids without s/s of aspiration.           Outcome: Ongoing, Progressing

## 2023-06-28 NOTE — PROGRESS NOTES
[facility] Neuro[facility] ([facility])  Physical Medicine & Rehab  Progress Note    Patient Name: Svetlana Beck  MRN: [id number]  Admission Date: 6/21/2023  Length of Stay: 7 days  Attending Physician: Maycol Raines MD    Subjective:     Principal Problem:Embolic stroke involving right middle cerebral artery    Hospital Course:   Per chart review,    OT- 06/27    Bed Mobility:     Patient completed Rolling/Turning to Left with  stand by assistance   Patient completed Scooting/Bridging with stand by assistance   Patient completed Supine to Sit with stand by assistance      Functional Mobility/Transfers:   Patient completed Sit <> Stand Transfer with contact guard assistance  with  rolling walker    Functional Mobility: Pt engaging in functional mobility to simulate household/community distances approx 20 ft with CGA and utilizing RW in order to maximize functional activity tolerance and standing balance required for engagement in occupations of choice.     Activities of Daily Living:   Grooming: contact guard assistance : To perform oral care and to wash her face in standing at the sink for ~ 3 minutes     PT-06/26    Functional Mobility:   Bed Mobility:      Rolling Left:  contact guard assistance   Supine to Sit: minimum assistance   Sit to Supine: moderate assistance   Transfers:      Sit to Stand:  contact guard assistance with rolling walker   Gait: x2 trials -- 46' and 50' CGA, NBOS, flexed posture, decreased clara, decreased step length. Max verbal cues for AD management and gait sequencing and posture.  Pt given therapeutic rest break between trials.   ? Pt also ambulated to stretcher for test at conclusion of session. At stretcher, pt mod A for sit->supine due to elevated height as compared to bed.        PT- 06/22    Functional Mobility:     Bed Mobility:   Supine > Sit with minimum assistance   Sit > Supine with moderate assistance   Scooting/bridging with moderate assistance       Transfers:    Sit <> Stand Transfer: Minimal Assistance x 1 trials from EOB with  no AD, HHA provided              Gait:   Pt performed trial of 3-4 side steps along bedside to L with min A of 2, bilateral HHA.    OT- 06/22    Bed Mobility:     Patient completed Scooting/Bridging with moderate assistance   Patient completed Supine to Sit with minimum assistance   Patient completed Sit to Supine with moderate assistance   Pt tolerated sitting EOB for ~7 minutes with CGA-SBA     Functional Mobility/Transfers:   Patient completed Sit <> Stand Transfer with minimum assistance  with  no assistive device and hand-held assist    Functional Mobility: Pt performed 3-4 side steps to the left from EOB with minimum (A) x2 people and HHA.    Activities of Daily Living:   Grooming: stand by assistance to wash face sitting EOB   Toileting: dependence to void via catheter      Interval History 6/28/2023:  Patient is seen for follow-up PM&R evaluation and recommendations: Pt seen at the bedside. Sitting on chair. Appears she had completed eating breakfast. Appropriately answering and following commands. Motivated for rehab.   HPI, Past Medical, Family, and Social History remains the same as documented in the initial encounter.    Scheduled Medications:    apixaban  5 mg Oral BID    atorvastatin  40 mg Oral Daily    ciprofloxacin HCl  500 mg Oral Q12H    folic acid  1 mg Oral Daily    furosemide  40 mg Oral BID    levETIRAcetam  500 mg Oral BID    melatonin  6 mg Oral Nightly    metoprolol succinate  12.5 mg Oral Daily    multivitamin  1 tablet Oral Daily    polyethylene glycol  17 g Oral Daily    potassium chloride  40 mEq Oral Once    senna-docusate 8.6-50 mg  1 tablet Oral BID    thiamine  100 mg Oral Daily    tiotropium bromide  2 puff Inhalation Daily       Diagnostic Results: Labs: Reviewed    PRN Medications: acetaminophen, albuterol sulfate, benzonatate, dextrose 10%, dextrose 10%, dextrose,  dextrose, glucagon (human recombinant), naloxone, nicotine, ondansetron, racepinephrine, sodium chloride 0.9%, sodium chloride 0.9%    Review of Systems   Constitutional:  Positive for activity change.   Cardiovascular:  Negative for chest pain and leg swelling.   Musculoskeletal:  Positive for gait problem.   Neurological:  Positive for weakness.   Objective:     Vital Signs (Most Recent):  Temp: 98.3 °F (36.8 °C) (06/28/23 0802)  Pulse: (!) 57 (06/28/23 0802)  Resp: 18 (06/28/23 0802)  BP: 119/64 (06/28/23 0802)  SpO2: 97 % (06/28/23 0802)    Vital Signs (24h Range):  Temp:  [97.6 °F (36.4 °C)-100.8 °F (38.2 °C)] 98.3 °F (36.8 °C)  Pulse:  [54-72] 57  Resp:  [16-18] 18  SpO2:  [94 %-98 %] 97 %  BP: (111-128)/(52-75) 119/64         Physical Exam  Vitals and nursing note reviewed.   Constitutional:       General: She is awake.      Appearance: Normal appearance.   HENT:      Head: Normocephalic and atraumatic.   Pulmonary:      Effort: Pulmonary effort is normal. No respiratory distress.   Abdominal:      General: There is no distension.      Palpations: Abdomen is soft.   Musculoskeletal:         General: Normal range of motion.      Cervical back: Normal range of motion and neck supple.   Skin:     General: Skin is warm and dry.      Capillary Refill: Capillary refill takes 2 to 3 seconds.      Coloration: Skin is pale.   Neurological:      General: No focal deficit present.      Mental Status: She is alert and oriented to person, place, and time.      GCS: GCS eye subscore is 4. GCS verbal subscore is 5. GCS motor subscore is 6.      Motor: Weakness present.      Gait: Gait abnormal.   Psychiatric:         Attention and Perception: Attention normal.         Mood and Affect: Mood and affect normal.         Speech: Speech normal.         Behavior: Behavior is cooperative.        NEUROLOGICAL EXAMINATION:     MENTAL STATUS   Oriented to person, place, and time.   Speech: speech is normal       Assessment/Plan:       * Embolic stroke involving right middle cerebral artery  -MRI brain W WO contrast with moderate sized R frontal infarct with hemorrhagic conversion and without evidence of underlying mass.   -CTA completed overnight which showed stable hemorrhagic infarct with no LVO/critical stenosis and noted incidental 4mm R ICA aneurysm.   -TTE- EF of 18%.  -Cardiology had seen pt and had performed LHC. Per Card, Non-obstructive disease, Pt to continue supportive management.   -PT/OT rec for IPR. Pt amenable.   -6/28- Pt appears stable for rehab.     Alcohol abuse, daily use  -Does not appear to be in Alcohol withdrawal on exam.   -06/28- Appears stable.     E coli bacteremia  -Completing Rocephin, ending 06/30.  -06/28- Now on Cipro PO per primary.     Encephalopathy  -Improving. However, requiring restraints, camera sitter due to impulsiveness.      Takotsubo cardiomyopathy  -S/P LHC per cardiology.  -Supportive management as per cardiology.  -Currently on IV Lasix. Will need transitioning to PO Lasix prior to IPR.     Smoker  -Smoking cessation education needed.     Chronic obstructive pulmonary disease  -Stable.      PM&R Recommendation:     At this time, the PM&R team has reviewed this patient's ongoing medical case including inpatient diagnosis, medical history, clinical examination, labs, vitals, current social and functional history to provide the post-acute recommendation as follows:     RECOMMENDATIONS: inpatient rehabilitation due to fair to good potential for improvement with therapies and need of physician oversight for management of ongoing active medical issues, once medically stable.       The patient will be admitted for comprehensive interdisciplinary inpatient rehabilitation to address the impairments due to her medical diagnosis of Embolic stroke involving right middle cerebral artery. The patient will benefit from an inpatient rehabilitation program to promote functional recovery, implement compensatory  strategies and will undergo assessment for needs for durable medical equipment for safe discharge to the community. This patient will benefit from a coordinated interdisciplinary rehabilitation program services that require close monitoring and treatment with 24-hour rehabilitative nursing and  physical/occupational/speech therapies for 3 hours/day for 5 days/week. This interdisciplinary program will be performed under the direction of a physiatrist.         We will  sign off with the transfer of the patient to [facility] liaison who will continue to follow going forward until admission to [facility].        Anisa Arreola NP  Department of Physical Medicine & Rehab   [facility] Neurosurgery ([facility])

## 2023-06-28 NOTE — PLAN OF CARE
[facility]    FACILITY TRANSFER ORDERS      Patient Name: Svetlana Beck  YOB: 1950    PCP: Elida Lara DO   PCP Address: [address] [place] [address] / [id number]  PCP Phone Number: [phone]  PCP Fax: [phone]    Encounter Date: 06/28/2023    Admit to:IPR    Vital Signs:  Routine    Diagnoses:   Active Hospital Problems    Diagnosis  POA    *Embolic stroke involving right middle cerebral artery [I63.411]  Yes    Alcohol abuse, daily use [F10.10]  Yes    Takotsubo cardiomyopathy [I51.81]  Yes    Encephalopathy [G93.40]  Yes    NSTEMI (non-ST elevated myocardial infarction) [I21.4]  Yes    E coli bacteremia [R78.81, B96.20]  Yes    Smoker [F17.200]  Yes     Chronic    Chronic obstructive pulmonary disease [J44.9]  Yes     Chronic      Resolved Hospital Problems   No resolved problems to display.       Allergies:  Review of patient's allergies indicates:   Allergen Reactions    Penicillins Other (See Comments)       Diet: minced thin, level 5    Activities: Activity as tolerated, per facility protocol    Goals of Care Treatment Preferences:  Code Status: Full Code      Nursing: per facility protocol    Labs: per facility protocol    CONSULTS:    Physical Therapy to evaluate and treat. , Occupational Therapy to evaluate and treat., and Speech Therapy to evaluate and treat for Swallowing and Cognition.    MISCELLANEOUS CARE:  per facility protocol    WOUND CARE ORDERS  per facility protocol    Medications: Review discharge medications with patient and family and provide education.      Current Discharge Medication List        START taking these medications    Details   apixaban (ELIQUIS) 5 mg Tab Take 1 tablet (5 mg total) by mouth 2 (two) times daily.  Qty: 60 tablet, Refills: 0      atorvastatin (LIPITOR) 40 MG tablet Take 1 tablet (40 mg total) by mouth once daily.  Qty: 90 tablet, Refills: 0      ciprofloxacin HCl (CIPRO) 500 MG tablet Take 1 tablet (500 mg total) by mouth  every 12 (twelve) hours. for 7 days  Qty: 14 tablet, Refills: 0      folic acid (FOLVITE) 1 MG tablet Take 1 tablet (1 mg total) by mouth once daily.  Qty: 30 tablet, Refills: 0      furosemide (LASIX) 40 MG tablet Take 1 tablet (40 mg total) by mouth 2 (two) times daily.  Qty: 60 tablet, Refills: 0      levETIRAcetam (KEPPRA) 500 MG Tab Take 1 tablet (500 mg total) by mouth 2 (two) times daily. for 2 days  Qty: 4 tablet, Refills: 0      melatonin (MELATIN) 3 mg tablet Take 2 tablets (6 mg total) by mouth nightly. for 10 days  Qty: 20 tablet, Refills: 0      metoprolol succinate (TOPROL-XL) 25 MG 24 hr tablet Take 0.5 tablets (12.5 mg total) by mouth once daily.  Qty: 15 tablet, Refills: 0    Comments: .      multivitamin Tab Take 1 tablet by mouth once daily.  Qty: 30 tablet, Refills: 0      thiamine 100 MG tablet Take 1 tablet (100 mg total) by mouth once daily.  Qty: 20 tablet, Refills: 0           CONTINUE these medications which have NOT CHANGED    Details   albuterol (PROVENTIL/VENTOLIN HFA) 90 mcg/actuation inhaler Inhale 2 puffs into the lungs every 6 (six) hours as needed for Wheezing. Rescue  Qty: 18 g, Refills: 3    Associated Diagnoses: Chronic obstructive pulmonary disease, unspecified COPD type      fluticasone propionate (FLONASE) 50 mcg/actuation nasal spray 1 spray (50 mcg total) by Each Nostril route once daily.  Qty: 18 g, Refills: 3      loratadine (CLARITIN) 10 mg tablet TAKE 1 TABLET BY MOUTH ONCE DAILY  Qty: 30 tablet, Refills: 0           STOP taking these medications       naproxen sodium (ANAPROX) 220 MG tablet Comments:   Reason for Stopping:                  Immunizations Administered as of 6/28/2023       Name Date Dose VIS Date Route Exp Date    COVID-19, MRNA, LN-JUSTIN GARCIA (Pf[employer]) (Purple Cap) 1/13/2022 0.3 mL -- Intramuscular --    Site: Left arm     : [employer]     Lot: [id number]     COVID-19, MRNA, LN-S PF (Pfizer) (Purple Cap) 12/23/2021 0.3 mL -- Intramuscular --    Site:  Left arm     : [employer]     Lot: [id number]                   _________________________________  Geno Gutierrez PA-C  06/28/2023

## 2023-06-28 NOTE — ASSESSMENT & PLAN NOTE
-MRI brain W WO contrast with moderate sized R frontal infarct with hemorrhagic conversion and without evidence of underlying mass.   -CTA completed overnight which showed stable hemorrhagic infarct with no LVO/critical stenosis and noted incidental 4mm R ICA aneurysm.   -TTE- EF of 18%.  -Cardiology had seen pt and had performed LHC. Per Card, Non-obstructive disease, Pt to continue supportive management.   -PT/OT rec for IPR. Pt amenable.   -6/28- Pt appears stable for rehab.

## 2023-06-28 NOTE — PHYSICIAN QUERY
PT Name: Svetlana Beck  MR #: [id number]    DOCUMENTATION CLARIFICATION     CDS: Radha RANDALL,RN        Contact information:[email or web address]    This form is a permanent document in the medical record.     Query Date: June 28, 2023    By submitting this query, we are merely seeking further clarification of documentation. Please utilize your independent clinical judgment when addressing the question(s) below.    The Medical Record contains the following:   Indicators   Supporting Clinical Findings Location in Medical Record   x AMS, Confusion,  LOC, etc.  She presented to the ED today with confusion.      Positive for confusion (mild; able to follow conversation appropriately)      Patient initially presented to [place] ED s/p documented mechanical fall in setting of shortness of breath, diarrhea, dizziness, and intermittent confusion  6/21  Neurosurgery Consults     6/22 Vascular Neurology  Subjective & Objective       6/28  Vascular Neurology DS   x Acute/Chronic Illness  Embolic stroke involving  right middle cerebral artery, alcohol abuse, daily use, e coli bacteremia, encephalopathy,  takotsubo cardiomyopathy, smoker chronic obstructive pulmonary  disease    Encephalopathy likely secondary to brain bleed (ischemic stroke with hemorrhage conversion versus venous embolism)    6/28  Physical Medicine PN       6/22 Cardiology Assessment & Plan Note   x Radiology Findings FINDINGS:  There is a hyperdense 4 cm mass posterior right frontal region with surrounding edema.  The remaining brain parenchyma is unremarkable.  No skull lesion or skull fracture seen.  No skull lesion or skull fracture seen.   Impression:   Hyperdense mass right posterior frontal region could represent a hematoma.  This is probably most likely.  A brain neoplasm is thought to be less likely.  For further evaluation MRI of the brain without with contrast is recommended.    FINDINGS:  Study distorted by motion and beam hardening  artifacts.  Within limits of the study evolving heterogeneous attenuation right inferior frontal gyrus most compatible with evolving infarct with hemorrhagic conversion as seen on prior MRI and relatively the similar to CT.   No definite significant new hemorrhage or new abnormal parenchymal attenuation.  Localized mass effect with stable configuration of ventricles without hydrocephalus.  No significant midline shift.   No definite extra-axial extension of hemorrhage.  Visualized paranasal sinuses and mastoid air cells are clear   Impression:   Allowing for scatter artifacts evolving right inferior frontal infarction with hemorrhagic conversion.  No significant new hemorrhage or increased mass effect.   Clinical correlation and continued follow-up advised.         6/21  CT Head Without Contrast                          6/26  CT Head Without Contrast       Electrolyte Imbalance     x Medication Ciprofloxacin HCl tablet 500 mg  Oral Every 12 hours  Levetiracetam tablet 500 mg  Oral  2 times daily   Lorazepam injection 0.5 mg Intravenous ED 1 Time 1 dose(s) given   Olanazapine tablet 5 mg  Oral  Every  4 hours PRN 2 dose(s) given  Cefepime 2 g in dextrose 5% in water 100 ml IVPB Intravenous Every 8 hours  Ceftriaxone 1 g in dextrose 5% in water 100 ml IVPB Intravenous Every 24  hours 4 dose(s) given   6/27---->present MAR   6/21----->present MAR   6/21 MAR   6/25 MAR   6/22--->6/23 MAR     6/23---->6/27 MAR    Treatment             x Other Lactate, venous: 2.8--->2.4--->2.0    WBC: 12.06--->15.11--->13.06-->11.10--->10.50-->10.11-->9.58-->7.94  MICROPCR: Escherichia coli Detected   Blood Culture: Escherichia Coli  Blood Culture, Routine: Gram stain aer bottle: Gram negative rods  Blood Culture, Routine: No growth after 5 days    Urine Culture, Routine: No growth      In brief, patient initially presented to [facility] ED s/p documented mechanical fall in setting of shortness of breath, diarrhea, dizziness, and  intermittent confusion. OSH ED evaluation notable for elevated troponin  6/22 Lab     6/21---->6/28 Labs   6/21 Lab     6/21 Labs   6/22 Labs     6/22 Lab         6/28  Vascular Neurology DS     The noted clinical guidelines are only system guidelines and do not replace the providers clinical judgment.    The National [place] of Neurologic Disorders and Stroke (NINDS) of the NIH describes encephalopathy as any diffuse disease of the brain that alters brain function or structure.    Provider, please  further specify the  type(s) of encephalopathy diagnosis  associated with above clinical findings.    [  ] Metabolic Encephalopathy - Due to electrolyte imbalance, metabolic derangements, or infectious processes, includes Septic Encephalopathy, Uremic Encephalopathy   [   ] Traumatic Encephalopathy - Due to concussion or other structural brain injury   [ X ] Encephalopathy, unspecified      [   ] Other Encephalopathy (please specify): ____________________   [   ] Other neurological condition- Includes Post-ictal altered mental status (please specify condition): __________       Please document in your progress notes daily for the duration of treatment until resolved, and include in your discharge summary.    References:  ROBSON Mercado RN, CCDS. (2018, June 9). Notes from the Instructor: Encephalopathy tips. Retrieved October 22, 2020, from [email or web address]    ICD-9-CM Coding Clinic First Quarter 2013, Effective with discharges: October 21, 2013 [facility] § Seizure with encephalopathy due to postictal state (2013).    ICD-10-CM/PCS L[employer] Integrated Codebook (Version V.20.8.10.0) [Computer software]. (2020). Retrieved October 21, 2020.    National [place] of Neurological Disorders and Stroke. (2019, March 27). Retrieved October 22, 2020, from https://www.ninds.nih.gov/Disorders/All-Disorders/Fvwopvqtjwtfwk-Ccloksuyosy-Ggtq    Form No. 21391

## 2023-06-28 NOTE — PROGRESS NOTES
[facility] Neurosurgery ([facility])  Vascular Neurology  Comprehensive Stroke Center  Progress Note    Assessment/Plan:     * Embolic stroke involving right middle cerebral artery  72 year old woman transferred from OSH for imaging evaluation by Neurosurgery due to concern for mass vs hematoma complicated by persistently elevated troponin on arrival requiring admission to CCU for diagnostic left heart cath. Further imaging notable for right frontal infarction with hemorrhagic transformation; likely acute in timeline. NIHSS 4; likely pseudo elevated as patient examined immediately following procedure. No focal deficits noted. Remained in CCU overnight 6/22 and was transferred to  primary service on 6/23am. MRI brain W WO contrast with moderate sized R frontal infarct with hemorrhagic conversion and without evidence of underlying mass. CTA completed overnight which showed stable hemorrhagic infarct with no LVO/critical stenosis and noted incidental 4mm R ICA aneurysm.   Suspect hemorrhagic stroke etiology to be cardio-embolic vs large vessel atherosclerotic disease.    Stable for discharge to [facility]. On cipro for 7 more days for e coli bacteremia. One 100.8F temperature yesterday afternoon, isolated, resolved on its own without need for meds, no leukocytosis or any other complaints. Followup with Mariza Pena in Clinic for R ICA aneurysm per Dr. Raines.      Antithrombotics for secondary stroke prevention: Eliquis 5mg BID    Statins for secondary stroke prevention and HLD, if present: Statins: Atorvastatin- 40 mg daily    Aggressive risk factor modification: Smoking, Diet, Exercise, CAD     Rehab efforts: The patient has been evaluated by a stroke team provider and the therapy needs have been fully considered based off the presenting complaints and exam findings. The following therapy evaluations are needed: PT evaluate and treat, OT evaluate and treat, SLP evaluate and treat     Currently NPO except for medications  crushed in puree and puree for pleasure. Pending MBSS on 6/28     Diagnostics ordered/pending: None     VTE prophylaxis: Mechanical prophylaxis: Place SCDs  None: Reason for No Pharmacological VTE Prophylaxis: Currently on anticoagulation    BP parameters:  SBP <160    Alcohol abuse, daily use  -Stroke risk factor.  Patient self reports drinking 1-2 beers daily.  Last drink 1 month ago.  -Encourage continued cessation.      E coli bacteremia  E. Coli bacteremia. Initially on Cefepime and then transitioned to Rocephin  In anticipation of discharge to [facility] on 6/28 will switch to oral Cipro  Needs 14 days total abx, currently on D7    Takotsubo cardiomyopathy  Cardiology consulted on arrival and initially admitted to CICU for further workup of trop elevation with acute EF drop and apical ballooning on bedside echo    Recs as per 6/22 progress note:  WMA consistent with typical Takotsubo cardiomyopathy  [facility] with nonobstructive disease  - Supportive care  - Beta blockade when able, resting HR currently low  - Elevated LVEDP on [facility]  - Lasix 40 IV BID    6/28 previously on Lasix 40 IV BID, now approaching euvolemia, switched to Lasix 40 PO BID yesterday    Smoker  Stroke risk factor  Continue to encourage cessation when the patient is able to engage  Nicotine patch PRN    Chronic obstructive pulmonary disease  History of, on albuterol PRN at home per chart  Pulmonology curbsided , appreciate recs:  --scheduled duonebs q4h while awake x 4 doses  --budesonide stopped, started spiriva 2 puffs daily (continue at dc)  --consider prednisone 40mg for 5 days unless any contraindications from cardiology or neuro perspectives  --refer to pulmonology for outpatient follow up as she will need PFTs for further evaluation of her suspected obstructive lung disease    Continue daily spiriva, duonebs q4hrs while awake  SpO2 goal >88% but <92%         06/23/2023 NAEO, neuro exam stable and nonfocal. NIHSS 0. CTA overnight with stable  hemorrhagic conversion and no LVO/high grade stenosis. Eliquis started for stroke prevention due to EF 18%. Cefepime switched to ceftriaxone for e coli bacteremia. Pulmonology curbsided for recs due to history of COPD, appreciate assistance. SLP consulted for swallow eval. Dispo recs for IPR, placement pending.  6/24/2023 Low urine output overnight.  30 cc on bladder scan.  Patient has EF 18% on last TTE.  Cardiology saw patient on 6/22 s/p [facility] and recommended Lasix 40 mg BID, will start.  Patient restless overnight.  She has a self-reported history of drinking 1-2 beers a day.  Last drink 1 month ago.  06/25/2023 NAEO, neuro exam stable. Restlessness improved today, restraints removed. Reports breathing improved with spiriva, will continue at discharge. Pending IPR placement.  06/26/2023 NAEO. This morning was more lethargic and difficult to arouse but still able to move all extremities without any obvious focal deficits, repeat CTH stable. Also notified by RN of concern for aspiration when patient was eating breakfast. Respiratory called to bedside for breathing treatment due to coughing, PRN racepinepherine ordered for audible wheezing and concern for stridor. Awaiting IPR placement, and patient must remain out of restraints x24hrs.  6/28/2023 Stable for discharge to IPR. On cipro for 7 more days for e coli bacteremia. One 100.8F temperature yesterday afternoon, isolated, resolved on its own without need for meds, no leukocytosis or any other complaints. Followup with Mariza Pena in Clinic for R ICA aneurysm per Dr. Raines.      STROKE DOCUMENTATION   Acute Stroke Times   Unknown Normal Date: Unknown Date  Unknown Normal Time: Unknown Time  Unknown Symptom Onset Date: Unknown Date  Unknown Symptom Onset Time: Unknown Time  Thrombolytic Therapy Recommended: No  Thrombectomy Recommended: No    NIH Scale:  1a. Level of Consciousness: 0-->Alert, keenly responsive  1b. LOC Questions: 0-->Answers both questions  correctly  1c. LOC Commands: 0-->Performs both tasks correctly  2. Best Gaze: 0-->Normal  3. Visual: 0-->No visual loss  4. Facial Palsy: 0-->Normal symmetrical movements  5a. Motor Arm, Left: 0-->No drift, limb holds 90 (or 45) degrees for full 10 secs  5b. Motor Arm, Right: 0-->No drift, limb holds 90 (or 45) degrees for full 10 secs  6a. Motor Leg, Left: 0-->No drift, leg holds 30 degree position for full 5 secs  6b. Motor Leg, Right: 0-->No drift, leg holds 30 degree position for full 5 secs  7. Limb Ataxia: 0-->Absent  8. Sensory: 0-->Normal, no sensory loss  9. Best Language: 0-->No aphasia, normal  10. Dysarthria: 0-->Normal  11. Extinction and Inattention (formerly Neglect): 0-->No abnormality  Total (NIH Stroke Scale): 0       Modified [place] Score: 3  [facility] Coma Scale:    ABCD2 Score:    [id number] Score:   HAS -BLED Score:   ICH Score:   Hunt & Mckeon Classification:      Hemorrhagic change of an Ischemic Stroke: Does this patient have an ischemic stroke with hemorrhagic changes? No     Neurologic Chief Complaint: Hemorrhagic conversion of R MCA infarct    Subjective:     Interval History: Patient is seen for follow-up neurological assessment and treatment recommendations:   Stable for discharge to [facility]. On cipro for 7 more days for e coli bacteremia. One 100.8F temperature yesterday afternoon, isolated, resolved on its own without need for meds, no leukocytosis or any other complaints. Followup with Mariza Pena in Clinic for R ICA aneurysm per Dr. Raines.    HPI, Past Medical, Family, and Social History remains the same as documented in the initial encounter.     Review of Systems   Constitutional:  Positive for activity change. Negative for fatigue.   HENT:  Negative for drooling and trouble swallowing.    Eyes:  Negative for visual disturbance.   Respiratory:  Positive for shortness of breath (improved).    Cardiovascular:  Negative for palpitations.   Gastrointestinal:  Negative for nausea and  vomiting.   Musculoskeletal:  Negative for neck stiffness.   Skin:  Negative for color change.   Neurological:  Negative for speech difficulty, weakness and headaches.   Psychiatric/Behavioral:  Negative for confusion.    Scheduled Meds:   apixaban  5 mg Oral BID    atorvastatin  40 mg Oral Daily    ciprofloxacin HCl  500 mg Oral Q12H    folic acid  1 mg Oral Daily    furosemide  40 mg Oral BID    levETIRAcetam  500 mg Oral BID    melatonin  6 mg Oral Nightly    metoprolol succinate  12.5 mg Oral Daily    multivitamin  1 tablet Oral Daily    polyethylene glycol  17 g Oral Daily    senna-docusate 8.6-50 mg  1 tablet Oral BID    thiamine  100 mg Oral Daily    tiotropium bromide  2 puff Inhalation Daily     Continuous Infusions:  PRN Meds:acetaminophen, albuterol sulfate, benzonatate, dextrose 10%, dextrose 10%, dextrose, dextrose, glucagon (human recombinant), naloxone, nicotine, ondansetron, racepinephrine, sodium chloride 0.9%, sodium chloride 0.9%    Objective:     Vital Signs (Most Recent):  Temp: 98.3 °F (36.8 °C) (06/28/23 0802)  Pulse: 60 (06/28/23 1131)  Resp: 18 (06/28/23 0802)  BP: 119/64 (06/28/23 0802)  SpO2: 97 % (06/28/23 0802)  BP Location: Left arm    Vital Signs Range (Last 24H):  Temp:  [97.6 °F (36.4 °C)-100.8 °F (38.2 °C)]   Pulse:  [54-72]   Resp:  [16-18]   BP: (111-128)/(52-75)   SpO2:  [94 %-98 %]   BP Location: Left arm       Physical Exam  Vitals and nursing note reviewed.   Constitutional:       General: She is not in acute distress.  HENT:      Head: Normocephalic.      Nose: Nose normal.      Mouth/Throat:      Pharynx: No oropharyngeal exudate or posterior oropharyngeal erythema.   Eyes:      Extraocular Movements: Extraocular movements intact.      Conjunctiva/sclera: Conjunctivae normal.   Cardiovascular:      Rate and Rhythm: Normal rate.   Pulmonary:      Effort: Pulmonary effort is normal. No respiratory distress.   Abdominal:      General: There is no distension.    Musculoskeletal:         General: No deformity or signs of injury.      Cervical back: Normal range of motion. No rigidity.   Skin:     General: Skin is warm and dry.   Neurological:      Mental Status: She is alert.      Cranial Nerves: No facial asymmetry.      Sensory: Sensation is intact.            Neurological Exam:   LOC: alert  Attention Span: good  Language: No aphasia  Articulation: No dysarthria  Orientation: Person, Place, Time   Visual Fields: Full  EOM (CN III, IV, VI): Full/intact  Facial Sensation (CN V): Normal  Facial Movement (CN VII): Symmetric facial expression    Motor: LUE Normal 5/5, LLE Normal 5/5, RUE Normal 5/5, RLE Normal 5/5  Sensation: Intact to light touch      Laboratory:  CMP:   Recent Labs   Lab 06/28/23  0408   CALCIUM 9.1   ALBUMIN 3.0*   PROT 7.1   *   K 3.1*   CO2 27   CL 95   BUN 17   CREATININE 0.8   ALKPHOS 84   *   AST 83*   BILITOT 0.6       CBC:   Recent Labs   Lab 06/28/23  0408   WBC 7.94   RBC 3.74*   HGB 13.5   HCT 39.7      *   MCH 36.1*   MCHC 34.0       Lipid Panel:   Recent Labs   Lab 06/23/23  0359   CHOL 128   LDLCALC 65.6   HDL 38*   TRIG 122       Coagulation:   Recent Labs   Lab 06/21/23  1721   INR 1.0   APTT 22.1       Hgb A1C:   Recent Labs   Lab 06/23/23  0359   HGBA1C 4.6       TSH: No results for input(s): TSH in the last 168 hours.    Diagnostic Results     Brain Imaging   CTH without contrast 6/26/2023  Allowing for scatter artifacts evolving right inferior frontal infarction with hemorrhagic conversion.  No significant new hemorrhage or increased mass effect.    MRI brain W WO contrast 6/22/2023  Moderate sized right frontal hemorrhagic infarction, similar to prior CT.  No underlying mass lesions appreciated.  Follow up examination as clinically indicated.     CTH without contrast 6/21/2023  Hyperdense mass right posterior frontal region could represent a hematoma.  This is probably most likely.  A brain neoplasm is  thought to be less likely.  For further evaluation MRI of the brain without with contrast is recommended.      Vessel Imaging   CTA head/neck 6/23/2023  Right frontal hemorrhagic infarct again identified with mild surrounding edema and localized mass effect.  No midline shift.   CTA shows no high-grade stenosis or large vessel occlusion.  No vascular malformation underlying the right the frontal hemorrhage.   Incidental note is made of a 4 mm right ICA aneurysm.  Recommend follow-up to ensure long-term stability.  This can likely be followed with MRA.   Centrilobular emphysematous changes at the lung apices.       Cardiac Imaging   TTE 6/22/2023   The left ventricle is normal in size with mild eccentric hypertrophy and severely decreased systolic function.   The estimated ejection fraction is 18%.   There are segmental left ventricular wall motion abnormalities.   Grade I left ventricular diastolic dysfunction.   Normal right ventricular size with normal right ventricular systolic function.   Elevated central venous pressure (15 mmHg).   Trivial posterior pericardial effusion. Just at the base.   Compared with 10/27/2020, marked worsening has occured.      Geno Gutierrez PA-C  Comprehensive Stroke Center  Department of Vascular Neurology   [facility] Neurosurgery [facility])

## 2023-06-28 NOTE — ASSESSMENT & PLAN NOTE
History of, on albuterol PRN at home per chart  Pulmonology curbsided , appreciate recs:  --scheduled duonebs q4h while awake x 4 doses  --budesonide stopped, started spiriva 2 puffs daily (continue at dc)  --consider prednisone 40mg for 5 days unless any contraindications from cardiology or neuro perspectives  --refer to pulmonology for outpatient follow up as she will need PFTs for further evaluation of her suspected obstructive lung disease    Continue daily spiriva, duonebs q4hrs while awake  SpO2 goal >88% but <92%

## 2023-06-28 NOTE — SUBJECTIVE & OBJECTIVE
Neurologic Chief Complaint: Hemorrhagic conversion of R MCA infarct    Subjective:     Interval History: Patient is seen for follow-up neurological assessment and treatment recommendations:   Stable for discharge to [facility]. On cipro for 7 more days for e coli bacteremia. One 100.8F temperature yesterday afternoon, isolated, resolved on its own without need for meds, no leukocytosis or any other complaints. Followup with Mariza Pena in Clinic for R ICA aneurysm per Dr. Raines.    HPI, Past Medical, Family, and Social History remains the same as documented in the initial encounter.     Review of Systems   Constitutional:  Positive for activity change. Negative for fatigue.   HENT:  Negative for drooling and trouble swallowing.    Eyes:  Negative for visual disturbance.   Respiratory:  Positive for shortness of breath (improved).    Cardiovascular:  Negative for palpitations.   Gastrointestinal:  Negative for nausea and vomiting.   Musculoskeletal:  Negative for neck stiffness.   Skin:  Negative for color change.   Neurological:  Negative for speech difficulty, weakness and headaches.   Psychiatric/Behavioral:  Negative for confusion.    Scheduled Meds:   apixaban  5 mg Oral BID    atorvastatin  40 mg Oral Daily    ciprofloxacin HCl  500 mg Oral Q12H    folic acid  1 mg Oral Daily    furosemide  40 mg Oral BID    levETIRAcetam  500 mg Oral BID    melatonin  6 mg Oral Nightly    metoprolol succinate  12.5 mg Oral Daily    multivitamin  1 tablet Oral Daily    polyethylene glycol  17 g Oral Daily    senna-docusate 8.6-50 mg  1 tablet Oral BID    thiamine  100 mg Oral Daily    tiotropium bromide  2 puff Inhalation Daily     Continuous Infusions:  PRN Meds:acetaminophen, albuterol sulfate, benzonatate, dextrose 10%, dextrose 10%, dextrose, dextrose, glucagon (human recombinant), naloxone, nicotine, ondansetron, racepinephrine, sodium chloride 0.9%, sodium chloride 0.9%    Objective:     Vital Signs (Most Recent):  Temp:  98.3 °F (36.8 °C) (06/28/23 0802)  Pulse: 60 (06/28/23 1131)  Resp: 18 (06/28/23 0802)  BP: 119/64 (06/28/23 0802)  SpO2: 97 % (06/28/23 0802)  BP Location: Left arm    Vital Signs Range (Last 24H):  Temp:  [97.6 °F (36.4 °C)-100.8 °F (38.2 °C)]   Pulse:  [54-72]   Resp:  [16-18]   BP: (111-128)/(52-75)   SpO2:  [94 %-98 %]   BP Location: Left arm       Physical Exam  Vitals and nursing note reviewed.   Constitutional:       General: She is not in acute distress.  HENT:      Head: Normocephalic.      Nose: Nose normal.      Mouth/Throat:      Pharynx: No oropharyngeal exudate or posterior oropharyngeal erythema.   Eyes:      Extraocular Movements: Extraocular movements intact.      Conjunctiva/sclera: Conjunctivae normal.   Cardiovascular:      Rate and Rhythm: Normal rate.   Pulmonary:      Effort: Pulmonary effort is normal. No respiratory distress.   Abdominal:      General: There is no distension.   Musculoskeletal:         General: No deformity or signs of injury.      Cervical back: Normal range of motion. No rigidity.   Skin:     General: Skin is warm and dry.   Neurological:      Mental Status: She is alert.      Cranial Nerves: No facial asymmetry.      Sensory: Sensation is intact.            Neurological Exam:   LOC: alert  Attention Span: good  Language: No aphasia  Articulation: No dysarthria  Orientation: Person, Place, Time   Visual Fields: Full  EOM (CN III, IV, VI): Full/intact  Facial Sensation (CN V): Normal  Facial Movement (CN VII): Symmetric facial expression    Motor: LUE Normal 5/5, LLE Normal 5/5, RUE Normal 5/5, RLE Normal 5/5  Sensation: Intact to light touch      Laboratory:  CMP:   Recent Labs   Lab 06/28/23  0408   CALCIUM 9.1   ALBUMIN 3.0*   PROT 7.1   *   K 3.1*   CO2 27   CL 95   BUN 17   CREATININE 0.8   ALKPHOS 84   *   AST 83*   BILITOT 0.6       CBC:   Recent Labs   Lab 06/28/23  0408   WBC 7.94   RBC 3.74*   HGB 13.5   HCT 39.7      *   MCH 36.1*    MCHC 34.0       Lipid Panel:   Recent Labs   Lab 06/23/23  0359   CHOL 128   LDLCALC 65.6   HDL 38*   TRIG 122       Coagulation:   Recent Labs   Lab 06/21/23  1721   INR 1.0   APTT 22.1       Hgb A1C:   Recent Labs   Lab 06/23/23  0359   HGBA1C 4.6       TSH: No results for input(s): TSH in the last 168 hours.    Diagnostic Results     Brain Imaging   CTH without contrast 6/26/2023  Allowing for scatter artifacts evolving right inferior frontal infarction with hemorrhagic conversion.  No significant new hemorrhage or increased mass effect.    MRI brain W WO contrast 6/22/2023  Moderate sized right frontal hemorrhagic infarction, similar to prior CT.  No underlying mass lesions appreciated.  Follow up examination as clinically indicated.     CTH without contrast 6/21/2023  Hyperdense mass right posterior frontal region could represent a hematoma.  This is probably most likely.  A brain neoplasm is thought to be less likely.  For further evaluation MRI of the brain without with contrast is recommended.      Vessel Imaging   CTA head/neck 6/23/2023  Right frontal hemorrhagic infarct again identified with mild surrounding edema and localized mass effect.  No midline shift.   CTA shows no high-grade stenosis or large vessel occlusion.  No vascular malformation underlying the right the frontal hemorrhage.   Incidental note is made of a 4 mm right ICA aneurysm.  Recommend follow-up to ensure long-term stability.  This can likely be followed with MRA.   Centrilobular emphysematous changes at the lung apices.       Cardiac Imaging   TTE 6/22/2023  The left ventricle is normal in size with mild eccentric hypertrophy and severely decreased systolic function.  The estimated ejection fraction is 18%.  There are segmental left ventricular wall motion abnormalities.  Grade I left ventricular diastolic dysfunction.  Normal right ventricular size with normal right ventricular systolic function.  Elevated central venous pressure  (15 mmHg).  Trivial posterior pericardial effusion. Just at the base.  Compared with 10/27/2020, marked worsening has occured.

## 2023-06-28 NOTE — PLAN OF CARE
06/28/23 1203   Final Note   Assessment Type Final Discharge Note   Anticipated Discharge Disposition Rehab   Post-Acute Status   Post-Acute Authorization Placement   Post-Acute Placement Status Set-up Complete/Auth obtained   Coverage PHN     Patient accepted at [facility] and auth obtained.  SW met with patient at bedside to provide info and asked if she wanted her sister contacted.  She requested to call herself.  RN given number for report and WC van set up and ETA is 3pm.      Maria T Romero, CHARLIE  [facility]  [phone]

## 2023-06-28 NOTE — ASSESSMENT & PLAN NOTE
E. Coli bacteremia. Initially on Cefepime and then transitioned to Rocephin  In anticipation of discharge to [facility] on 6/28 will switch to oral Cipro  Needs 14 days total abx, currently on D7

## 2023-06-28 NOTE — SUBJECTIVE & OBJECTIVE
Interval History 6/28/2023:  Patient is seen for follow-up PM&R evaluation and recommendations: Pt seen at the bedside. Sitting on chair. Appears she had completed eating breakfast. Appropriately answering and following commands. Motivated for rehab.   HPI, Past Medical, Family, and Social History remains the same as documented in the initial encounter.    Scheduled Medications:    apixaban  5 mg Oral BID    atorvastatin  40 mg Oral Daily    ciprofloxacin HCl  500 mg Oral Q12H    folic acid  1 mg Oral Daily    furosemide  40 mg Oral BID    levETIRAcetam  500 mg Oral BID    melatonin  6 mg Oral Nightly    metoprolol succinate  12.5 mg Oral Daily    multivitamin  1 tablet Oral Daily    polyethylene glycol  17 g Oral Daily    potassium chloride  40 mEq Oral Once    senna-docusate 8.6-50 mg  1 tablet Oral BID    thiamine  100 mg Oral Daily    tiotropium bromide  2 puff Inhalation Daily       Diagnostic Results: Labs: Reviewed    PRN Medications: acetaminophen, albuterol sulfate, benzonatate, dextrose 10%, dextrose 10%, dextrose, dextrose, glucagon (human recombinant), naloxone, nicotine, ondansetron, racepinephrine, sodium chloride 0.9%, sodium chloride 0.9%    Review of Systems   Constitutional:  Positive for activity change.   Cardiovascular:  Negative for chest pain and leg swelling.   Musculoskeletal:  Positive for gait problem.   Neurological:  Positive for weakness.   Objective:     Vital Signs (Most Recent):  Temp: 98.3 °F (36.8 °C) (06/28/23 0802)  Pulse: (!) 57 (06/28/23 0802)  Resp: 18 (06/28/23 0802)  BP: 119/64 (06/28/23 0802)  SpO2: 97 % (06/28/23 0802)    Vital Signs (24h Range):  Temp:  [97.6 °F (36.4 °C)-100.8 °F (38.2 °C)] 98.3 °F (36.8 °C)  Pulse:  [54-72] 57  Resp:  [16-18] 18  SpO2:  [94 %-98 %] 97 %  BP: (111-128)/(52-75) 119/64         Physical Exam  Vitals and nursing note reviewed.   Constitutional:       General: She is awake.      Appearance: Normal appearance.   HENT:      Head: Normocephalic  and atraumatic.   Pulmonary:      Effort: Pulmonary effort is normal. No respiratory distress.   Abdominal:      General: There is no distension.      Palpations: Abdomen is soft.   Musculoskeletal:         General: Normal range of motion.      Cervical back: Normal range of motion and neck supple.   Skin:     General: Skin is warm and dry.      Capillary Refill: Capillary refill takes 2 to 3 seconds.      Coloration: Skin is pale.   Neurological:      General: No focal deficit present.      Mental Status: She is alert and oriented to person, place, and time.      GCS: GCS eye subscore is 4. GCS verbal subscore is 5. GCS motor subscore is 6.      Motor: Weakness present.      Gait: Gait abnormal.   Psychiatric:         Attention and Perception: Attention normal.         Mood and Affect: Mood and affect normal.         Speech: Speech normal.         Behavior: Behavior is cooperative.        NEUROLOGICAL EXAMINATION:     MENTAL STATUS   Oriented to person, place, and time.   Speech: speech is normal

## 2023-06-28 NOTE — ASSESSMENT & PLAN NOTE
72 year old woman transferred from OSH for imaging evaluation by Neurosurgery due to concern for mass vs hematoma complicated by persistently elevated troponin on arrival requiring admission to CCU for diagnostic left heart cath. Further imaging notable for right frontal infarction with hemorrhagic transformation; likely acute in timeline. NIHSS 4; likely pseudo elevated as patient examined immediately following procedure. No focal deficits noted. Remained in CCU overnight 6/22 and was transferred to  primary service on 6/23am. MRI brain W WO contrast with moderate sized R frontal infarct with hemorrhagic conversion and without evidence of underlying mass. CTA completed overnight which showed stable hemorrhagic infarct with no LVO/critical stenosis and noted incidental 4mm R ICA aneurysm.   Suspect hemorrhagic stroke etiology to be cardio-embolic vs large vessel atherosclerotic disease.    Stable for discharge to [facility]. On cipro for 7 more days for e coli bacteremia. One 100.8F temperature yesterday afternoon, isolated, resolved on its own without need for meds, no leukocytosis or any other complaints. Followup with Mariza Pena in Clinic for R ICA aneurysm per Dr. Raines.      Antithrombotics for secondary stroke prevention: Eliquis 5mg BID    Statins for secondary stroke prevention and HLD, if present: Statins: Atorvastatin- 40 mg daily    Aggressive risk factor modification: Smoking, Diet, Exercise, CAD     Rehab efforts: The patient has been evaluated by a stroke team provider and the therapy needs have been fully considered based off the presenting complaints and exam findings. The following therapy evaluations are needed: PT evaluate and treat, OT evaluate and treat, SLP evaluate and treat     Currently NPO except for medications crushed in puree and puree for pleasure. Pending MBSS on 6/28     Diagnostics ordered/pending: None     VTE prophylaxis: Mechanical prophylaxis: Place SCDs  None: Reason for No  Pharmacological VTE Prophylaxis: Currently on anticoagulation    BP parameters:  SBP <160

## 2023-06-28 NOTE — DISCHARGE SUMMARY
[facility] - Neurosurgery ([facility])  Vascular Neurology  Comprehensive Stroke Center  Discharge Summary     Summary:     Admit Date: 6/21/2023  4:56 PM    Discharge Date and Time:  06/28/2023 12:01 PM    Attending Physician: Maycol Raines MD     Discharge Provider: Geno Gutierrez PA-C    History of Present Illness: Vascular Neurology consulted for ICH evaluation and potential speciality transfer of Svetlana Beck, a 72 y.o. female smoker with history of COPD who presented to [facility] after being found down. History obtained via EMR primarily as patient provides limited history of events.     In brief, patient initially presented to [facility] ED s/p documented mechanical fall in setting of shortness of breath, diarrhea, dizziness, and intermittent confusion. OSH ED evaluation notable for elevated troponin, EKG with normal sinus rhythm with occasional premature ventricular complexes and left bundle branch block, and NCCTH with concern for mass vs hematoma. Following discussion with Neurosurgery, patient was subsequently transferred to [facility] ED for further evaluation of NCCTH findings. Diagnostics at [facility] ED notable for persistent troponin elevation. After multi-disciplinary discussion, patient was admitted to CCU for ACS concern.      Hospital Course (synopsis of major diagnoses, care, treatment, and services provided during the course of the hospital stay):   72 year old woman transferred from OSH for imaging evaluation by Neurosurgery due to concern for mass vs hematoma complicated by persistently elevated troponin on arrival requiring admission to CCU for diagnostic left heart cath. Further imaging notable for right frontal infarction with hemorrhagic transformation; likely acute in timeline. NIHSS 4; likely pseudo elevated as patient examined immediately following procedure. No focal deficits noted. Remained in CCU overnight 6/22 and was transferred to  primary service on 6/23am. MRI brain W WO contrast  with moderate sized R frontal infarct with hemorrhagic conversion and without evidence of underlying mass. CTA completed overnight which showed stable hemorrhagic infarct with no LVO/critical stenosis and noted incidental 4mm R ICA aneurysm. Etiology likely CE.     Stable for discharge to IPR. Followup with Mariza Pena in Clinic for R ICA aneurysm per Dr. Raines.    06/23/2023 NAEO, neuro exam stable and nonfocal. NIHSS 0. CTA overnight with stable hemorrhagic conversion and no LVO/high grade stenosis. Eliquis started for stroke prevention due to EF 18%. Cefepime switched to ceftriaxone for e coli bacteremia. Pulmonology curbsided for recs due to history of COPD, appreciate assistance. SLP consulted for swallow eval. Dispo recs for IPR, placement pending.  6/24/2023 Low urine output overnight.  30 cc on bladder scan.  Patient has EF 18% on last TTE.  Cardiology saw patient on 6/22 s/p C and recommended Lasix 40 mg BID, will start.  Patient restless overnight.  She has a self-reported history of drinking 1-2 beers a day.  Last drink 1 month ago.  06/25/2023 NAEO, neuro exam stable. Restlessness improved today, restraints removed. Reports breathing improved with spiriva, will continue at discharge. Pending IPR placement.  06/26/2023 NAEO. This morning was more lethargic and difficult to arouse but still able to move all extremities without any obvious focal deficits, repeat CTH stable. Also notified by RN of concern for aspiration when patient was eating breakfast. Respiratory called to bedside for breathing treatment due to coughing, PRN racepinepherine ordered for audible wheezing and concern for stridor. Awaiting IPR placement, and patient must remain out of restraints x24hrs.  6/28/2023 Stable for discharge to IPR. On cipro for 7 more days for e coli bacteremia. One 100.8F temperature yesterday afternoon, isolated, resolved on its own without need for meds, no leukocytosis or any other complaints. Followup  with Mariza Pena in Clinic for R ICA aneurysm per Dr. Raines.      Goals of Care Treatment Preferences:  Code Status: Full Code      Stroke Etiology: Ischemic likely Cardioembolic    STROKE DOCUMENTATION   Acute Stroke Times   Unknown Normal Date: Unknown Date  Unknown Normal Time: Unknown Time  Unknown Symptom Onset Date: Unknown Date  Unknown Symptom Onset Time: Unknown Time  Thrombolytic Therapy Recommended: No  Thrombectomy Recommended: No     NIH Scale:  1a. Level of Consciousness: 0-->Alert, keenly responsive  1b. LOC Questions: 0-->Answers both questions correctly  1c. LOC Commands: 0-->Performs both tasks correctly  2. Best Gaze: 0-->Normal  3. Visual: 0-->No visual loss  4. Facial Palsy: 0-->Normal symmetrical movements  5a. Motor Arm, Left: 0-->No drift, limb holds 90 (or 45) degrees for full 10 secs  5b. Motor Arm, Right: 0-->No drift, limb holds 90 (or 45) degrees for full 10 secs  6a. Motor Leg, Left: 0-->No drift, leg holds 30 degree position for full 5 secs  6b. Motor Leg, Right: 0-->No drift, leg holds 30 degree position for full 5 secs  7. Limb Ataxia: 0-->Absent  8. Sensory: 0-->Normal, no sensory loss  9. Best Language: 0-->No aphasia, normal  10. Dysarthria: 0-->Normal  11. Extinction and Inattention (formerly Neglect): 0-->No abnormality  Total (NIH Stroke Scale): 0        Modified [place] Score: 3  [place]ia Coma Scale:    ABCD2 Score:    [id number] Score:   HAS -BLED Score:   ICH Score:   Hunt & Mckeon Classification:       Assessment/Plan:     Diagnostic Results:      Brain Imaging   CTH without contrast 6/26/2023  Allowing for scatter artifacts evolving right inferior frontal infarction with hemorrhagic conversion.  No significant new hemorrhage or increased mass effect.     MRI brain W WO contrast 6/22/2023  Moderate sized right frontal hemorrhagic infarction, similar to prior CT.  No underlying mass lesions appreciated.  Follow up examination as clinically indicated.      CTH without  contrast 6/21/2023  Hyperdense mass right posterior frontal region could represent a hematoma.  This is probably most likely.  A brain neoplasm is thought to be less likely.  For further evaluation MRI of the brain without with contrast is recommended.        Vessel Imaging   CTA head/neck 6/23/2023  Right frontal hemorrhagic infarct again identified with mild surrounding edema and localized mass effect.  No midline shift.   CTA shows no high-grade stenosis or large vessel occlusion.  No vascular malformation underlying the right the frontal hemorrhage.   Incidental note is made of a 4 mm right ICA aneurysm.  Recommend follow-up to ensure long-term stability.  This can likely be followed with MRA.   Centrilobular emphysematous changes at the lung apices.         Cardiac Imaging   TTE 6/22/2023  The left ventricle is normal in size with mild eccentric hypertrophy and severely decreased systolic function.  The estimated ejection fraction is 18%.  There are segmental left ventricular wall motion abnormalities.  Grade I left ventricular diastolic dysfunction.  Normal right ventricular size with normal right ventricular systolic function.  Elevated central venous pressure (15 mmHg).  Trivial posterior pericardial effusion. Just at the base.  Compared with 10/27/2020, marked worsening has occured.    Interventions: None    Complications: None    Disposition: Rehab Facility    Final Active Diagnoses:    Diagnosis Date Noted POA    PRINCIPAL PROBLEM:  Embolic stroke involving right middle cerebral artery [I63.411] 06/21/2023 Yes    Alcohol abuse, daily use [F10.10] 06/24/2023 Yes    Takotsubo cardiomyopathy [I51.81] 06/22/2023 Yes    Encephalopathy [G93.40] 06/22/2023 Yes    NSTEMI (non-ST elevated myocardial infarction) [I21.4] 06/22/2023 Yes    E coli bacteremia [R78.81, B96.20] 06/22/2023 Yes    Smoker [F17.200] 10/15/2020 Yes     Chronic    Chronic obstructive pulmonary disease [J44.9] 07/09/2018 Yes     Chronic       Problems Resolved During this Admission:     No new Assessment & Plan notes have been filed under this hospital service since the last note was generated.  Service: Vascular Neurology      Recommendations:     Post-discharge complication risks: Falls    Stroke Education given to: patient    Follow-up in Stroke Clinic in 4-6 weeks.     Discharge Plan:  Eliquis 5 BID  Atorvastatin 40  PCP in 1 week  VN in 4-6 weeks(with Mariza Colvin NP)  Smoking cessation program referral    Follow Up:   Follow-up Information       Elida Lara, DO Follow up in 1 week(s).    Specialty: Urgent Care  Contact information:  [address] [place] [id number] [phone]                             Patient Instructions:      Ambulatory referral/consult to Vascular Neurology   Standing Status: Future   Referral Priority: Routine Referral Type: Consultation   Referral Reason: Specialty Services Required   Referred to Provider: MARIZA COLVIN Requested Specialty: Vascular Neurology   Number of Visits Requested: 1     Ambulatory referral/consult to Smoking Cessation Program   Standing Status: Future   Referral Priority: Routine Referral Type: Consultation   Referral Reason: Specialty Services Required   Requested Specialty: CTTS   Number of Visits Requested: 1     Activity as tolerated       Medications:  Reconciled Home Medications:      Medication List        START taking these medications      apixaban 5 mg Tab  Commonly known as: ELIQUIS  Take 1 tablet (5 mg total) by mouth 2 (two) times daily.     atorvastatin 40 MG tablet  Commonly known as: LIPITOR  Take 1 tablet (40 mg total) by mouth once daily.  Start taking on: June 29, 2023     ciprofloxacin HCl 500 MG tablet  Commonly known as: CIPRO  Take 1 tablet (500 mg total) by mouth every 12 (twelve) hours. for 7 days     folic acid 1 MG tablet  Commonly known as: FOLVITE  Take 1 tablet (1 mg total) by mouth once daily.  Start taking on: June 29, 2023     furosemide  40 MG tablet  Commonly known as: LASIX  Take 1 tablet (40 mg total) by mouth 2 (two) times daily.     levETIRAcetam 500 MG Tab  Commonly known as: KEPPRA  Take 1 tablet (500 mg total) by mouth 2 (two) times daily. for 2 days     melatonin 3 mg tablet  Commonly known as: MELATIN  Take 2 tablets (6 mg total) by mouth nightly. for 10 days     metoprolol succinate 25 MG 24 hr tablet  Commonly known as: TOPROL-XL  Take 0.5 tablets (12.5 mg total) by mouth once daily.  Start taking on: June 29, 2023     multivitamin Tab  Take 1 tablet by mouth once daily.  Start taking on: June 29, 2023     nicotine 14 mg/24 hr  Commonly known as: NICODERM CQ  Place 1 patch onto the skin once daily.     thiamine 100 MG tablet  Take 1 tablet (100 mg total) by mouth once daily.     tiotropium bromide 2.5 mcg/actuation inhaler  Commonly known as: SPIRIVA RESPIMAT  Inhale 2 puffs into the lungs once daily. Controller  Start taking on: June 29, 2023            CHANGE how you take these medications      loratadine 10 mg tablet  Commonly known as: CLARITIN  TAKE 1 TABLET BY MOUTH ONCE DAILY  What changed: when to take this            CONTINUE taking these medications      albuterol 90 mcg/actuation inhaler  Commonly known as: PROVENTIL/VENTOLIN HFA  Inhale 2 puffs into the lungs every 6 (six) hours as needed for Wheezing. Rescue     fluticasone propionate 50 mcg/actuation nasal spray  Commonly known as: FLONASE  1 spray (50 mcg total) by Each Nostril route once daily.            STOP taking these medications      naproxen sodium 220 MG tablet  Commonly known as: ANAPROX              Geno Gutierrez PA-C  Comprehensive Stroke Center  Department of Vascular Neurology   [facility])

## 2023-06-28 NOTE — PT/OT/SLP PROGRESS
"Speech Language Pathology Treatment    Patient Name:  Svetlana Beck   MRN:  [id number]  Admitting Diagnosis: Embolic stroke involving right middle cerebral artery    Recommendations:                 General Recommendations:  Dysphagia therapy and Cognitive-linguistic therapy  Diet recommendations:  Mechanical soft, Liquid Diet Level: Thin   Aspiration Precautions: Alternating bites/sips, HOB to 90 degrees, Meds crushed in puree, Small bites/sips, and Standard aspiration precautions   General Precautions: Standard, aspiration, fall  Communication strategies:  go to room if call light pushed    Assessment:     Svetlana Beck is a 72 y.o. female with an SLP diagnosis of Dysphagia and Cognitive-Linguistic Impairment.    Subjective     "My mouth was dry and the pill got stuck" per pt. Re choking episode earlier in week  Patient goals: home     Pain/Comfort:  Pain Rating 1: 0/10  Pain Rating Post-Intervention 1: 0/10    Respiratory Status: Room air    Objective:     Has the patient been evaluated by SLP for swallowing?   Yes  Keep patient NPO? No   Current Respiratory Status:        Pt. Seen at bedside and was alert with good attention to task and good recall of recent events.  SHe assisted in repositioning herself in bed and hob was raised to 90 degree angle.  Pt. Reported that she fele dry mouth contributed to choking episode.  Pt. Was observed self feeding 4 ounces of water via cup with a straw , 2 teaspoons applesauce and self fed 2 crackers.  Pt. Is endentulous but did report eating meat, etc pta.  Pt. Instructed to take several sips of water prior to po intake as mouth was dry.  Oral and pharyngeal phases of swallow were wfl with adequate mastication and bolus formation and no delay in transit.  Initiation of pharyngeal swallow was not delayed.  NO coughing,throat clearing or change in vocal quality was noted following the swallow.  Ongoing education provided re safes swallow strategies and aspiration " precautions  Goals:   Multidisciplinary Problems       SLP Goals          Problem: SLP    Goal Priority Disciplines Outcome   SLP Goal     SLP Ongoing, Progressing   Description: Speech Language Pathology Goals  Goals expected to be met by 7/1:  1. Pt will tolerate least restrictive diet and thin liquids without s/s of aspiration.                                Plan:     Patient to be seen:  4 x/week   Plan of Care expires:  07/23/23  Plan of Care reviewed with:  patient   SLP Follow-Up:  Yes       Discharge recommendations:  rehabilitation facility   Barriers to Discharge:  None    Time Tracking:     SLP Treatment Date:      Speech Start Time:  0730  Speech Stop Time:  0750     Speech Total Time (min):  20 min    Billable Minutes: Treatment Swallowing Dysfunction 12 and Self Care/Home Management Training 8    06/28/2023

## 2023-06-28 NOTE — ASSESSMENT & PLAN NOTE
Cardiology consulted on arrival and initially admitted to CICU for further workup of trop elevation with acute EF drop and apical ballooning on bedside echo    Recs as per 6/22 progress note:  WMA consistent with typical Takotsubo cardiomyopathy  [facility] with nonobstructive disease  - Supportive care  - Beta blockade when able, resting HR currently low  - Elevated LVEDP on LHC  - Lasix 40 IV BID    6/28 previously on Lasix 40 IV BID, now approaching euvolemia, switched to Lasix 40 PO BID yesterday

## 2023-09-25 PROBLEM — I63.411 EMBOLIC STROKE INVOLVING RIGHT MIDDLE CEREBRAL ARTERY: Status: RESOLVED | Noted: 2023-06-21 | Resolved: 2023-09-25

## 2023-09-25 PROBLEM — I21.4 NSTEMI (NON-ST ELEVATED MYOCARDIAL INFARCTION): Status: RESOLVED | Noted: 2023-06-22 | Resolved: 2023-09-25

## 2023-10-05 NOTE — ASSESSMENT & PLAN NOTE
History of, on albuterol PRN at home per chart  Pulmonology curbsided , appreciate recs:  --scheduled duonebs q4h while awake x 4 doses  --budesonide stopped, started spiriva 2 puffs daily (continue at dc)  --consider prednisone 40mg for 5 days unless any contraindications from cardiology or neuro perspectives  --refer to pulmonology for outpatient follow up as she will need PFTs for further evaluation of her suspected obstructive lung disease   Initial (On Arrival)

## 2024-08-05 ENCOUNTER — HOSPITAL ENCOUNTER (INPATIENT)
Facility: HOSPITAL | Age: 74
LOS: 1 days | Discharge: HOME | DRG: 190 | End: 2024-08-07
Attending: EMERGENCY MEDICINE | Admitting: INTERNAL MEDICINE
Payer: MEDICARE

## 2024-08-05 DIAGNOSIS — I50.20 HFREF (HEART FAILURE WITH REDUCED EJECTION FRACTION): ICD-10-CM

## 2024-08-05 DIAGNOSIS — F17.200 TOBACCO DEPENDENCY: ICD-10-CM

## 2024-08-05 DIAGNOSIS — J96.01 ACUTE HYPOXIC ON CHRONIC HYPERCAPNIC RESPIRATORY FAILURE: Primary | ICD-10-CM

## 2024-08-05 DIAGNOSIS — R06.02 SHORTNESS OF BREATH: ICD-10-CM

## 2024-08-05 DIAGNOSIS — J44.1 COPD EXACERBATION: ICD-10-CM

## 2024-08-05 DIAGNOSIS — J96.12 ACUTE HYPOXIC ON CHRONIC HYPERCAPNIC RESPIRATORY FAILURE: Primary | ICD-10-CM

## 2024-08-05 LAB
ALBUMIN SERPL BCP-MCNC: 3.9 G/DL (ref 3.5–5.2)
ALP SERPL-CCNC: 103 U/L (ref 55–135)
ALT SERPL W/O P-5'-P-CCNC: 13 U/L (ref 10–44)
ANION GAP SERPL CALC-SCNC: 13 MMOL/L (ref 8–16)
AST SERPL-CCNC: 18 U/L (ref 10–40)
BASOPHILS # BLD AUTO: 0.01 K/UL (ref 0–0.2)
BASOPHILS NFR BLD: 0.1 % (ref 0–1.9)
BILIRUB SERPL-MCNC: 0.3 MG/DL (ref 0.1–1)
BNP SERPL-MCNC: 16 PG/ML (ref 0–99)
BUN SERPL-MCNC: 19 MG/DL (ref 8–23)
CALCIUM SERPL-MCNC: 9.2 MG/DL (ref 8.7–10.5)
CHLORIDE SERPL-SCNC: 104 MMOL/L (ref 95–110)
CO2 SERPL-SCNC: 24 MMOL/L (ref 23–29)
CREAT SERPL-MCNC: 0.9 MG/DL (ref 0.5–1.4)
DIFFERENTIAL METHOD BLD: ABNORMAL
EOSINOPHIL # BLD AUTO: 0 K/UL (ref 0–0.5)
EOSINOPHIL NFR BLD: 0 % (ref 0–8)
ERYTHROCYTE [DISTWIDTH] IN BLOOD BY AUTOMATED COUNT: 14.7 % (ref 11.5–14.5)
EST. GFR  (NO RACE VARIABLE): >60 ML/MIN/1.73 M^2
GLUCOSE SERPL-MCNC: 150 MG/DL (ref 70–110)
HCT VFR BLD AUTO: 40.2 % (ref 37–48.5)
HGB BLD-MCNC: 13.2 G/DL (ref 12–16)
IMM GRANULOCYTES # BLD AUTO: 0.06 K/UL (ref 0–0.04)
IMM GRANULOCYTES NFR BLD AUTO: 0.6 % (ref 0–0.5)
INFLUENZA A, MOLECULAR: NEGATIVE
INFLUENZA B, MOLECULAR: NEGATIVE
LYMPHOCYTES # BLD AUTO: 1.1 K/UL (ref 1–4.8)
LYMPHOCYTES NFR BLD: 10.2 % (ref 18–48)
MCH RBC QN AUTO: 31 PG (ref 27–31)
MCHC RBC AUTO-ENTMCNC: 32.8 G/DL (ref 32–36)
MCV RBC AUTO: 94 FL (ref 82–98)
MONOCYTES # BLD AUTO: 0.6 K/UL (ref 0.3–1)
MONOCYTES NFR BLD: 5.9 % (ref 4–15)
NEUTROPHILS # BLD AUTO: 8.5 K/UL (ref 1.8–7.7)
NEUTROPHILS NFR BLD: 83.2 % (ref 38–73)
NRBC BLD-RTO: 0 /100 WBC
PLATELET # BLD AUTO: 362 K/UL (ref 150–450)
PMV BLD AUTO: 10 FL (ref 9.2–12.9)
POTASSIUM SERPL-SCNC: 3.9 MMOL/L (ref 3.5–5.1)
PROCALCITONIN SERPL IA-MCNC: <0.02 NG/ML
PROT SERPL-MCNC: 7.6 G/DL (ref 6–8.4)
RBC # BLD AUTO: 4.26 M/UL (ref 4–5.4)
SARS-COV-2 RDRP RESP QL NAA+PROBE: NEGATIVE
SODIUM SERPL-SCNC: 141 MMOL/L (ref 136–145)
SPECIMEN SOURCE: NORMAL
TROPONIN I SERPL DL<=0.01 NG/ML-MCNC: 0.01 NG/ML (ref 0–0.03)
WBC # BLD AUTO: 10.26 K/UL (ref 3.9–12.7)

## 2024-08-05 PROCEDURE — 84484 ASSAY OF TROPONIN QUANT: CPT | Performed by: EMERGENCY MEDICINE

## 2024-08-05 PROCEDURE — 96365 THER/PROPH/DIAG IV INF INIT: CPT

## 2024-08-05 PROCEDURE — 94761 N-INVAS EAR/PLS OXIMETRY MLT: CPT

## 2024-08-05 PROCEDURE — 94640 AIRWAY INHALATION TREATMENT: CPT

## 2024-08-05 PROCEDURE — 96375 TX/PRO/DX INJ NEW DRUG ADDON: CPT

## 2024-08-05 PROCEDURE — 85025 COMPLETE CBC W/AUTO DIFF WBC: CPT | Performed by: EMERGENCY MEDICINE

## 2024-08-05 PROCEDURE — 63600175 PHARM REV CODE 636 W HCPCS: Performed by: EMERGENCY MEDICINE

## 2024-08-05 PROCEDURE — 83880 ASSAY OF NATRIURETIC PEPTIDE: CPT | Performed by: EMERGENCY MEDICINE

## 2024-08-05 PROCEDURE — 27000221 HC OXYGEN, UP TO 24 HOURS

## 2024-08-05 PROCEDURE — 99285 EMERGENCY DEPT VISIT HI MDM: CPT | Mod: 25

## 2024-08-05 PROCEDURE — 80053 COMPREHEN METABOLIC PANEL: CPT | Performed by: EMERGENCY MEDICINE

## 2024-08-05 PROCEDURE — 93005 ELECTROCARDIOGRAM TRACING: CPT

## 2024-08-05 PROCEDURE — 25000242 PHARM REV CODE 250 ALT 637 W/ HCPCS: Performed by: EMERGENCY MEDICINE

## 2024-08-05 PROCEDURE — 87502 INFLUENZA DNA AMP PROBE: CPT | Performed by: EMERGENCY MEDICINE

## 2024-08-05 PROCEDURE — U0002 COVID-19 LAB TEST NON-CDC: HCPCS | Performed by: EMERGENCY MEDICINE

## 2024-08-05 PROCEDURE — 93010 ELECTROCARDIOGRAM REPORT: CPT | Mod: ,,, | Performed by: STUDENT IN AN ORGANIZED HEALTH CARE EDUCATION/TRAINING PROGRAM

## 2024-08-05 PROCEDURE — 84145 PROCALCITONIN (PCT): CPT | Performed by: EMERGENCY MEDICINE

## 2024-08-05 RX ORDER — IPRATROPIUM BROMIDE 0.5 MG/2.5ML
500 SOLUTION RESPIRATORY (INHALATION)
Status: COMPLETED | OUTPATIENT
Start: 2024-08-05 | End: 2024-08-05

## 2024-08-05 RX ORDER — METHYLPREDNISOLONE SOD SUCC 125 MG
125 VIAL (EA) INJECTION
Status: COMPLETED | OUTPATIENT
Start: 2024-08-05 | End: 2024-08-05

## 2024-08-05 RX ORDER — ALBUTEROL SULFATE 2.5 MG/.5ML
10 SOLUTION RESPIRATORY (INHALATION)
Status: COMPLETED | OUTPATIENT
Start: 2024-08-05 | End: 2024-08-05

## 2024-08-05 RX ADMIN — METHYLPREDNISOLONE SODIUM SUCCINATE 125 MG: 125 INJECTION, POWDER, FOR SOLUTION INTRAMUSCULAR; INTRAVENOUS at 10:08

## 2024-08-05 RX ADMIN — IPRATROPIUM BROMIDE 500 MCG: 0.5 SOLUTION RESPIRATORY (INHALATION) at 10:08

## 2024-08-05 RX ADMIN — ALBUTEROL SULFATE 10 MG: 2.5 SOLUTION RESPIRATORY (INHALATION) at 10:08

## 2024-08-06 PROBLEM — J96.01 ACUTE HYPOXEMIC RESPIRATORY FAILURE: Status: ACTIVE | Noted: 2024-08-06

## 2024-08-06 PROBLEM — J96.12 ACUTE HYPOXIC ON CHRONIC HYPERCAPNIC RESPIRATORY FAILURE: Status: ACTIVE | Noted: 2024-08-06

## 2024-08-06 LAB
ANION GAP SERPL CALC-SCNC: 12 MMOL/L (ref 8–16)
APICAL FOUR CHAMBER EJECTION FRACTION: 45 %
APICAL TWO CHAMBER EJECTION FRACTION: 58 %
ASCENDING AORTA: 2.86 CM
AV INDEX (PROSTH): 0.82
AV MEAN GRADIENT: 5 MMHG
AV PEAK GRADIENT: 9 MMHG
AV VALVE AREA BY VELOCITY RATIO: 3.36 CM²
AV VALVE AREA: 3.25 CM²
AV VELOCITY RATIO: 0.84
BASOPHILS # BLD AUTO: 0 K/UL (ref 0–0.2)
BASOPHILS NFR BLD: 0 % (ref 0–1.9)
BSA FOR ECHO PROCEDURE: 1.92 M2
BUN SERPL-MCNC: 21 MG/DL (ref 8–23)
CALCIUM SERPL-MCNC: 9.1 MG/DL (ref 8.7–10.5)
CHLORIDE SERPL-SCNC: 103 MMOL/L (ref 95–110)
CO2 SERPL-SCNC: 25 MMOL/L (ref 23–29)
CREAT SERPL-MCNC: 0.9 MG/DL (ref 0.5–1.4)
CV ECHO LV RWT: 0.41 CM
DIFFERENTIAL METHOD BLD: ABNORMAL
DOP CALC AO PEAK VEL: 1.48 M/S
DOP CALC AO VTI: 27.4 CM
DOP CALC LVOT AREA: 4 CM2
DOP CALC LVOT DIAMETER: 2.25 CM
DOP CALC LVOT PEAK VEL: 1.25 M/S
DOP CALC LVOT STROKE VOLUME: 89.02 CM3
DOP CALC MV VTI: 22 CM
DOP CALCLVOT PEAK VEL VTI: 22.4 CM
E WAVE DECELERATION TIME: 182.4 MSEC
E/A RATIO: 0.58
E/E' RATIO: 8.13 M/S
ECHO LV POSTERIOR WALL: 0.84 CM (ref 0.6–1.1)
EOSINOPHIL # BLD AUTO: 0 K/UL (ref 0–0.5)
EOSINOPHIL NFR BLD: 0 % (ref 0–8)
ERYTHROCYTE [DISTWIDTH] IN BLOOD BY AUTOMATED COUNT: 14.8 % (ref 11.5–14.5)
EST. GFR  (NO RACE VARIABLE): >60 ML/MIN/1.73 M^2
FRACTIONAL SHORTENING: 17 % (ref 28–44)
GLUCOSE SERPL-MCNC: 147 MG/DL (ref 70–110)
HCT VFR BLD AUTO: 39.7 % (ref 37–48.5)
HGB BLD-MCNC: 12.7 G/DL (ref 12–16)
IMM GRANULOCYTES # BLD AUTO: 0.04 K/UL (ref 0–0.04)
IMM GRANULOCYTES NFR BLD AUTO: 0.4 % (ref 0–0.5)
INTERVENTRICULAR SEPTUM: 1.05 CM (ref 0.6–1.1)
IVC DIAMETER: 1.92 CM
LA MAJOR: 4.71 CM
LA MINOR: 4.69 CM
LA WIDTH: 2.8 CM
LEFT ATRIUM AREA SYSTOLIC (APICAL 2 CHAMBER): 14.45 CM2
LEFT ATRIUM AREA SYSTOLIC (APICAL 4 CHAMBER): 13.11 CM2
LEFT ATRIUM SIZE: 3.91 CM
LEFT ATRIUM VOLUME INDEX MOD: 17.9 ML/M2
LEFT ATRIUM VOLUME INDEX: 23 ML/M2
LEFT ATRIUM VOLUME MOD: 34.06 CM3
LEFT ATRIUM VOLUME: 43.74 CM3
LEFT INTERNAL DIMENSION IN SYSTOLE: 3.4 CM (ref 2.1–4)
LEFT VENTRICLE DIASTOLIC VOLUME INDEX: 39.49 ML/M2
LEFT VENTRICLE DIASTOLIC VOLUME: 75.04 ML
LEFT VENTRICLE END DIASTOLIC VOLUME APICAL 2 CHAMBER: 70.41 ML
LEFT VENTRICLE END DIASTOLIC VOLUME APICAL 4 CHAMBER: 62.73 ML
LEFT VENTRICLE END SYSTOLIC VOLUME APICAL 2 CHAMBER: 35.88 ML
LEFT VENTRICLE END SYSTOLIC VOLUME APICAL 4 CHAMBER: 30.65 ML
LEFT VENTRICLE MASS INDEX: 65 G/M2
LEFT VENTRICLE SYSTOLIC VOLUME INDEX: 24.9 ML/M2
LEFT VENTRICLE SYSTOLIC VOLUME: 47.37 ML
LEFT VENTRICULAR INTERNAL DIMENSION IN DIASTOLE: 4.12 CM (ref 3.5–6)
LEFT VENTRICULAR MASS: 123.03 G
LV LATERAL E/E' RATIO: 7.63 M/S
LV SEPTAL E/E' RATIO: 8.71 M/S
LVED V (TEICH): 75.04 ML
LVES V (TEICH): 47.37 ML
LVOT MG: 3.42 MMHG
LVOT MV: 0.87 CM/S
LYMPHOCYTES # BLD AUTO: 0.6 K/UL (ref 1–4.8)
LYMPHOCYTES NFR BLD: 6.9 % (ref 18–48)
MCH RBC QN AUTO: 30.2 PG (ref 27–31)
MCHC RBC AUTO-ENTMCNC: 32 G/DL (ref 32–36)
MCV RBC AUTO: 95 FL (ref 82–98)
MONOCYTES # BLD AUTO: 0.2 K/UL (ref 0.3–1)
MONOCYTES NFR BLD: 2.3 % (ref 4–15)
MV PEAK A VEL: 1.05 M/S
MV PEAK E VEL: 0.61 M/S
MV PEAK GRADIENT: 5 MMHG
MV STENOSIS PRESSURE HALF TIME: 52.9 MS
MV VALVE AREA BY CONTINUITY EQUATION: 4.05 CM2
MV VALVE AREA P 1/2 METHOD: 4.16 CM2
NEUTROPHILS # BLD AUTO: 8.3 K/UL (ref 1.8–7.7)
NEUTROPHILS NFR BLD: 90.4 % (ref 38–73)
NRBC BLD-RTO: 0 /100 WBC
OHS LV EJECTION FRACTION SIMPSONS BIPLANE MOD: 54 %
OHS QRS DURATION: 146 MS
OHS QTC CALCULATION: 489 MS
PISA TR MAX VEL: 2.1 M/S
PLATELET # BLD AUTO: 355 K/UL (ref 150–450)
PMV BLD AUTO: 9.8 FL (ref 9.2–12.9)
POTASSIUM SERPL-SCNC: 3.6 MMOL/L (ref 3.5–5.1)
PULM VEIN S/D RATIO: 1.63
PV PEAK D VEL: 0.27 M/S
PV PEAK GRADIENT: 3 MMHG
PV PEAK S VEL: 0.44 M/S
PV PEAK VELOCITY: 0.9 M/S
RA MAJOR: 4.85 CM
RA PRESSURE ESTIMATED: 3 MMHG
RA WIDTH: 2.88 CM
RBC # BLD AUTO: 4.2 M/UL (ref 4–5.4)
RIGHT VENTRICLE DIASTOLIC BASEL DIMENSION: 3.7 CM
RIGHT VENTRICLE DIASTOLIC MID DIMENSION: 3.3 CM
RV TB RVSP: 5 MMHG
RV TISSUE DOPPLER FREE WALL SYSTOLIC VELOCITY 1 (APICAL 4 CHAMBER VIEW): 13.14 CM/S
SINUS: 3.47 CM
SODIUM SERPL-SCNC: 140 MMOL/L (ref 136–145)
STJ: 2.81 CM
TDI LATERAL: 0.08 M/S
TDI SEPTAL: 0.07 M/S
TDI: 0.08 M/S
TR MAX PG: 18 MMHG
TRICUSPID ANNULAR PLANE SYSTOLIC EXCURSION: 2.21 CM
TV REST PULMONARY ARTERY PRESSURE: 21 MMHG
WBC # BLD AUTO: 9.23 K/UL (ref 3.9–12.7)
Z-SCORE OF LEFT VENTRICULAR DIMENSION IN END DIASTOLE: -2.58
Z-SCORE OF LEFT VENTRICULAR DIMENSION IN END SYSTOLE: 0.27

## 2024-08-06 PROCEDURE — 94640 AIRWAY INHALATION TREATMENT: CPT | Mod: XB

## 2024-08-06 PROCEDURE — 99900035 HC TECH TIME PER 15 MIN (STAT)

## 2024-08-06 PROCEDURE — 27000221 HC OXYGEN, UP TO 24 HOURS

## 2024-08-06 PROCEDURE — 63700000 PHARM REV CODE 250 ALT 637 W/O HCPCS

## 2024-08-06 PROCEDURE — 25000003 PHARM REV CODE 250

## 2024-08-06 PROCEDURE — 85025 COMPLETE CBC W/AUTO DIFF WBC: CPT

## 2024-08-06 PROCEDURE — 96366 THER/PROPH/DIAG IV INF ADDON: CPT

## 2024-08-06 PROCEDURE — 63600175 PHARM REV CODE 636 W HCPCS

## 2024-08-06 PROCEDURE — 25000242 PHARM REV CODE 250 ALT 637 W/ HCPCS: Performed by: EMERGENCY MEDICINE

## 2024-08-06 PROCEDURE — 80048 BASIC METABOLIC PNL TOTAL CA: CPT

## 2024-08-06 PROCEDURE — 25000242 PHARM REV CODE 250 ALT 637 W/ HCPCS

## 2024-08-06 PROCEDURE — 94761 N-INVAS EAR/PLS OXIMETRY MLT: CPT

## 2024-08-06 PROCEDURE — 11000001 HC ACUTE MED/SURG PRIVATE ROOM

## 2024-08-06 RX ORDER — AMLODIPINE BESYLATE 10 MG/1
10 TABLET ORAL DAILY
COMMUNITY
Start: 2024-07-29

## 2024-08-06 RX ORDER — OXYMETAZOLINE HCL 0.05 %
2 SPRAY, NON-AEROSOL (ML) NASAL 2 TIMES DAILY
COMMUNITY

## 2024-08-06 RX ORDER — LOPERAMIDE HCL 2 MG
2 TABLET ORAL EVERY 8 HOURS PRN
COMMUNITY

## 2024-08-06 RX ORDER — CHOLECALCIFEROL (VITAMIN D3) 125 MCG
220 CAPSULE ORAL EVERY 12 HOURS PRN
COMMUNITY

## 2024-08-06 RX ORDER — ONDANSETRON 4 MG/1
4 TABLET, ORALLY DISINTEGRATING ORAL EVERY 8 HOURS PRN
COMMUNITY
Start: 2024-04-12

## 2024-08-06 RX ORDER — DICYCLOMINE HYDROCHLORIDE 20 MG/1
20 TABLET ORAL EVERY 8 HOURS PRN
COMMUNITY
Start: 2024-06-05

## 2024-08-06 RX ORDER — IPRATROPIUM BROMIDE AND ALBUTEROL SULFATE 2.5; .5 MG/3ML; MG/3ML
3 SOLUTION RESPIRATORY (INHALATION)
Status: DISCONTINUED | OUTPATIENT
Start: 2024-08-06 | End: 2024-08-06

## 2024-08-06 RX ORDER — LOSARTAN POTASSIUM 25 MG/1
25 TABLET ORAL DAILY
Status: DISCONTINUED | OUTPATIENT
Start: 2024-08-06 | End: 2024-08-07 | Stop reason: HOSPADM

## 2024-08-06 RX ORDER — DONEPEZIL HYDROCHLORIDE 5 MG/1
5 TABLET, FILM COATED ORAL DAILY
COMMUNITY
Start: 2024-08-01

## 2024-08-06 RX ORDER — ATORVASTATIN CALCIUM 40 MG/1
40 TABLET, FILM COATED ORAL DAILY
COMMUNITY

## 2024-08-06 RX ORDER — ATORVASTATIN CALCIUM 40 MG/1
40 TABLET, FILM COATED ORAL DAILY
Status: DISCONTINUED | OUTPATIENT
Start: 2024-08-06 | End: 2024-08-07 | Stop reason: HOSPADM

## 2024-08-06 RX ORDER — TALC
6 POWDER (GRAM) TOPICAL NIGHTLY
Status: DISCONTINUED | OUTPATIENT
Start: 2024-08-06 | End: 2024-08-07 | Stop reason: HOSPADM

## 2024-08-06 RX ORDER — IPRATROPIUM BROMIDE AND ALBUTEROL SULFATE 2.5; .5 MG/3ML; MG/3ML
3 SOLUTION RESPIRATORY (INHALATION)
Status: COMPLETED | OUTPATIENT
Start: 2024-08-06 | End: 2024-08-06

## 2024-08-06 RX ORDER — LEVETIRACETAM 500 MG/1
500 TABLET ORAL 2 TIMES DAILY
Status: DISCONTINUED | OUTPATIENT
Start: 2024-08-06 | End: 2024-08-07 | Stop reason: HOSPADM

## 2024-08-06 RX ORDER — ALBUTEROL SULFATE 2.5 MG/.5ML
2.5 SOLUTION RESPIRATORY (INHALATION) EVERY 4 HOURS PRN
COMMUNITY

## 2024-08-06 RX ORDER — FLUTICASONE PROPIONATE 50 MCG
1 SPRAY, SUSPENSION (ML) NASAL DAILY
Status: DISCONTINUED | OUTPATIENT
Start: 2024-08-06 | End: 2024-08-07 | Stop reason: HOSPADM

## 2024-08-06 RX ORDER — FUROSEMIDE 40 MG/1
80 TABLET ORAL DAILY
Status: DISCONTINUED | OUTPATIENT
Start: 2024-08-06 | End: 2024-08-07 | Stop reason: HOSPADM

## 2024-08-06 RX ORDER — OXCARBAZEPINE 150 MG/1
150 TABLET, FILM COATED ORAL 2 TIMES DAILY
COMMUNITY
Start: 2024-07-22

## 2024-08-06 RX ORDER — MULTIVITAMIN
1 TABLET ORAL DAILY
COMMUNITY

## 2024-08-06 RX ORDER — SODIUM CHLORIDE 0.9 % (FLUSH) 0.9 %
10 SYRINGE (ML) INJECTION
Status: DISCONTINUED | OUTPATIENT
Start: 2024-08-06 | End: 2024-08-07 | Stop reason: HOSPADM

## 2024-08-06 RX ORDER — AZITHROMYCIN 250 MG/1
250 TABLET, FILM COATED ORAL DAILY
Status: COMPLETED | OUTPATIENT
Start: 2024-08-06 | End: 2024-08-06

## 2024-08-06 RX ORDER — TALC
6 POWDER (GRAM) TOPICAL NIGHTLY
COMMUNITY
Start: 2024-07-27

## 2024-08-06 RX ORDER — FUROSEMIDE 80 MG/1
80 TABLET ORAL DAILY
COMMUNITY
Start: 2024-07-01

## 2024-08-06 RX ORDER — PREDNISONE 20 MG/1
20 TABLET ORAL 2 TIMES DAILY
Status: ON HOLD | COMMUNITY
Start: 2024-08-01 | End: 2024-08-07 | Stop reason: HOSPADM

## 2024-08-06 RX ORDER — DONEPEZIL HYDROCHLORIDE 5 MG/1
5 TABLET, FILM COATED ORAL DAILY
Status: DISCONTINUED | OUTPATIENT
Start: 2024-08-06 | End: 2024-08-07 | Stop reason: HOSPADM

## 2024-08-06 RX ORDER — IPRATROPIUM BROMIDE AND ALBUTEROL SULFATE 2.5; .5 MG/3ML; MG/3ML
3 SOLUTION RESPIRATORY (INHALATION)
Status: DISCONTINUED | OUTPATIENT
Start: 2024-08-06 | End: 2024-08-07 | Stop reason: HOSPADM

## 2024-08-06 RX ORDER — GUAIFENESIN 100 MG/5ML
200 SOLUTION ORAL EVERY 4 HOURS PRN
COMMUNITY

## 2024-08-06 RX ORDER — ACETAMINOPHEN 325 MG/1
650 TABLET ORAL EVERY 4 HOURS PRN
COMMUNITY

## 2024-08-06 RX ORDER — LEVETIRACETAM 500 MG/1
500 TABLET ORAL 2 TIMES DAILY
COMMUNITY

## 2024-08-06 RX ORDER — OXCARBAZEPINE 150 MG/1
150 TABLET, FILM COATED ORAL 2 TIMES DAILY
Status: DISCONTINUED | OUTPATIENT
Start: 2024-08-06 | End: 2024-08-07 | Stop reason: HOSPADM

## 2024-08-06 RX ORDER — LOSARTAN POTASSIUM 25 MG/1
25 TABLET ORAL DAILY
COMMUNITY
Start: 2024-07-29

## 2024-08-06 RX ORDER — AMLODIPINE BESYLATE 5 MG/1
10 TABLET ORAL DAILY
Status: DISCONTINUED | OUTPATIENT
Start: 2024-08-06 | End: 2024-08-07 | Stop reason: HOSPADM

## 2024-08-06 RX ORDER — PREDNISONE 20 MG/1
40 TABLET ORAL DAILY
Status: DISCONTINUED | OUTPATIENT
Start: 2024-08-06 | End: 2024-08-07 | Stop reason: HOSPADM

## 2024-08-06 RX ADMIN — CEFTRIAXONE SODIUM 1 G: 1 INJECTION, POWDER, FOR SOLUTION INTRAMUSCULAR; INTRAVENOUS at 03:08

## 2024-08-06 RX ADMIN — PREDNISONE 40 MG: 20 TABLET ORAL at 08:08

## 2024-08-06 RX ADMIN — IPRATROPIUM BROMIDE AND ALBUTEROL SULFATE 3 ML: 2.5; .5 SOLUTION RESPIRATORY (INHALATION) at 12:08

## 2024-08-06 RX ADMIN — LOSARTAN POTASSIUM 25 MG: 25 TABLET, FILM COATED ORAL at 08:08

## 2024-08-06 RX ADMIN — LEVETIRACETAM 500 MG: 500 TABLET, FILM COATED ORAL at 08:08

## 2024-08-06 RX ADMIN — IPRATROPIUM BROMIDE AND ALBUTEROL SULFATE 3 ML: 2.5; .5 SOLUTION RESPIRATORY (INHALATION) at 08:08

## 2024-08-06 RX ADMIN — Medication 6 MG: at 03:08

## 2024-08-06 RX ADMIN — IPRATROPIUM BROMIDE AND ALBUTEROL SULFATE 3 ML: 2.5; .5 SOLUTION RESPIRATORY (INHALATION) at 07:08

## 2024-08-06 RX ADMIN — APIXABAN 5 MG: 5 TABLET, FILM COATED ORAL at 08:08

## 2024-08-06 RX ADMIN — AMLODIPINE BESYLATE 10 MG: 5 TABLET ORAL at 08:08

## 2024-08-06 RX ADMIN — Medication 6 MG: at 08:08

## 2024-08-06 RX ADMIN — IPRATROPIUM BROMIDE AND ALBUTEROL SULFATE 3 ML: 2.5; .5 SOLUTION RESPIRATORY (INHALATION) at 04:08

## 2024-08-06 RX ADMIN — TIOTROPIUM BROMIDE INHALATION SPRAY 2 PUFF: 3.12 SPRAY, METERED RESPIRATORY (INHALATION) at 08:08

## 2024-08-06 RX ADMIN — DONEPEZIL HYDROCHLORIDE 5 MG: 5 TABLET, FILM COATED ORAL at 08:08

## 2024-08-06 RX ADMIN — AZITHROMYCIN DIHYDRATE 250 MG: 250 TABLET ORAL at 08:08

## 2024-08-06 RX ADMIN — OXCARBAZEPINE 150 MG: 150 TABLET, FILM COATED ORAL at 08:08

## 2024-08-06 RX ADMIN — FLUTICASONE PROPIONATE 50 MCG: 50 SPRAY, METERED NASAL at 08:08

## 2024-08-06 RX ADMIN — OXCARBAZEPINE 150 MG: 150 TABLET, FILM COATED ORAL at 09:08

## 2024-08-06 RX ADMIN — FUROSEMIDE 80 MG: 40 TABLET ORAL at 08:08

## 2024-08-06 RX ADMIN — ATORVASTATIN CALCIUM 40 MG: 40 TABLET, FILM COATED ORAL at 08:08

## 2024-08-06 NOTE — NURSING
Received report from Ed nurse, Pt arrived on unit. VN notified. VS taken, pt on 2L NC, Purewick placed. Fall & allergy bands placed. Fall socks applied. Instructed pt to call for assistance.

## 2024-08-06 NOTE — ED NOTES
Pt returned from echo, requesting chocolate milk . Dietary called. Pt denies any other needs.     Hospital medicine at bedside

## 2024-08-06 NOTE — ED TRIAGE NOTES
"Patient presents to ED via [facility] from [facility] for SOB and hypoxia. Nursing home staff reports patient smokes "all day", reports she recently completed a course of Lasix and a Zpack. Reports not usually on oxygen but O2 sat have been in the upper 80s %. Reports Hx of COPD and dementia.   "

## 2024-08-06 NOTE — ED NOTES
Pt lying down, respirations even/unlabored. NAD noted. Updated pt on poc. Pt denies any needs. Side rails upx2, call bell within reach . Will continue to monitor

## 2024-08-06 NOTE — PROGRESS NOTES
VIRTUAL NURSE: Cued into patient's room. Permission received per patient to turn camera to view patient. Introduced as VN that will be working with floor nurse this shift. Educated the patient on VN's role in patient care. Plan of care reviewed with patient. Informed patient that staff will round on them every 2 hours but to use call light for any other needs they may have. Also informed of fall risk and fall precautions. Patient verbalized understanding. Call light within reach; bed siderails up x2. Opportunity given for questions and questions answered. Admission assessment questions completed. Patient denies complaints or any needs at this time.   08/06/24 1709   Admission   Initial VN Admission Questions Complete   Communication Issues? None   Shift   Virtual Nurse - Patient Verbalized Approval Of Camera Use   Safety/Activity   Patient Rounds bed in low position;placement of personal items at bedside;call light in patient/parent reach;visualized patient   Safety Promotion/Fall Prevention assistive device/personal item within reach;Fall Risk reviewed with patient/family;instructed to call staff for mobility;side rails raised x 2   Positioning   Body Position position changed independently   Head of Bed (HOB) Positioning HOB at 60-90 degrees   Pain/Comfort/Sleep   Preferred Pain Scale number (Numeric Rating Pain Scale)   Pain Rating (0-10): Rest 0

## 2024-08-06 NOTE — H&P
[facility] Medicine H&P Note     Admitting Team: [facility] Hospitalist Team A  Attending Physician: Matt Mcbride MD  Resident: Rolanda King  Intern: Laura Pena    Date of Admit: 8/5/2024    Chief Complaint     Cough, Shortness of Breath, and Increased Sputum production for 1 week    Subjective:      History of Present Illness:  Svetlana Beck is a 73 y.o. Female with a PMH of COPD, HTN, R MCA stroke (2023), Takotsubo cardiomyopathy, Dementia, and Combined HF who presents for 7 days of progressive shortness of breath, worsening cough, and increased sputum production.     The patient was in their usual state of health until about 7 days ago when she noticed she was having increased shortness of breath. She also noticed in increased frequency in her cough and increase in sputum production. She describes the sputum as largely clear in color and continued to aggravate her cough. She reported seeing her PCP at the facility ([facility]) who prescribed her some medications but continued to have worsening SOB. She reports she usually has no oxygen requirement at baseline, but with her worsening SOB, the medical staff at [facility] placed her on O2 as she was dipping down into O2 saturations of the mid 80s. She does recall having a couple co-residents at her facility with upper respiratory symptoms of cough, but denies fever, chills, chest pain, abdominal pain, nausea, vomiting, or changes to her bowel habits. Given the new O2 requirement, patient was sent to the ED for further evaluation.       Upon review of documentation from [facility], it appears that she was prescribed a 5 day course of azithromycin and a 5 day course of prednisone 20 mg on 8/1/24.    Past Medical History:  Past Medical History:   Diagnosis Date    Acute ischemic right MCA stroke     Asthma     COPD (chronic obstructive pulmonary disease)     Dementia     Essential (primary) hypertension     Takotsubo cardiomyopathy        Past  Surgical History:  Past Surgical History:   Procedure Laterality Date    ANGIOGRAM, CORONARY, WITH LEFT HEART CATHETERIZATION Left 6/22/2023    Procedure: Angiogram, Coronary, with Left Heart Cath;  Surgeon: Gab Caldwell MD;  Location: [facility] CATH LAB;  Service: Cardiology;  Laterality: Left;       Allergies:  Review of patient's allergies indicates:   Allergen Reactions    Penicillins Other (See Comments)       Home Medications:  Prior to Admission medications    Medication Sig Start Date End Date Taking? Authorizing Provider   amLODIPine (NORVASC) 10 MG tablet Take 10 mg by mouth once daily. 7/29/24  Yes Provider, Historical   dicyclomine (BENTYL) 20 mg tablet Take 20 mg by mouth after meals as needed (Stomach Cramps). 6/5/24  Yes Provider, Historical   donepeziL (ARICEPT) 5 MG tablet Take 5 mg by mouth once daily. 8/1/24  Yes Provider, Historical   furosemide (LASIX) 80 MG tablet Take 80 mg by mouth once daily. 7/1/24  Yes Provider, Historical   losartan (COZAAR) 25 MG tablet Take 25 mg by mouth once daily. 7/29/24  Yes Provider, Historical   melatonin (MELATIN) 3 mg tablet Take 6 mg by mouth nightly. 7/27/24  Yes Provider, Historical   OXcarbazepine (TRILEPTAL) 150 MG Tab Take 150 mg by mouth 2 (two) times daily. 7/22/24  Yes Provider, Historical   albuterol (PROVENTIL/VENTOLIN HFA) 90 mcg/actuation inhaler Inhale 2 puffs into the lungs every 6 (six) hours as needed for Wheezing. Rescue 5/25/22   Rere Alcala MD   apixaban (ELIQUIS) 5 mg Tab Take 1 tablet (5 mg total) by mouth 2 (two) times daily. 6/28/23   Geno Gutierrez PA-C   atorvastatin (LIPITOR) 40 MG tablet Take 1 tablet (40 mg total) by mouth once daily. 6/29/23 9/27/23  Geno Gutierrez PA-C   fluticasone propionate (FLONASE) 50 mcg/actuation nasal spray 1 spray (50 mcg total) by Each Nostril route once daily. 5/25/22   Rere Alcala MD   folic acid (FOLVITE) 1 MG tablet Take 1 tablet (1 mg total) by mouth once daily. 6/29/23 7/29/23   "Geno Gutierrez PA-C   levETIRAcetam (KEPPRA) 500 MG Tab Take 1 tablet (500 mg total) by mouth 2 (two) times daily. for 2 days 23  Geno Gutierrez PA-C   loratadine (CLARITIN) 10 mg tablet TAKE 1 TABLET BY MOUTH ONCE DAILY  Patient taking differently: Take 10 mg by mouth once daily. 22   Rere Alcala MD   metoprolol succinate (TOPROL-XL) 25 MG 24 hr tablet Take 0.5 tablets (12.5 mg total) by mouth once daily. 23  Geno Gutierrez PA-C   tiotropium bromide (SPIRIVA RESPIMAT) 2.5 mcg/actuation inhaler Inhale 2 puffs into the lungs once daily. Controller 23   Geno Gutierrez PA-C   multivitamin Tab Take 1 tablet by mouth once daily. 23  Geno Gutierrez PA-C   nicotine (NICODERM CQ) 14 mg/24 hr Place 1 patch onto the skin once daily. 23  Geno Gutierrez PA-C   thiamine 100 MG tablet Take 1 tablet (100 mg total) by mouth once daily. 23  Geno Gutierrez PA-C       Family History:  No family history on file.    Social History:  Social History     Tobacco Use    Smoking status: Every Day    Smokeless tobacco: Never   Substance Use Topics    Alcohol use: Yes   Started smoking around age 17, smokes 1 - 1.25 PPD.     Review of Systems:  Pertinent ROS listed in HPI above     Health Maintaince :   Primary Care Physician: Joan Cabrera MD / Lisy Díaz NP    Immunizations:   Immunization History   Administered Date(s) Administered    COVID-19, MRNA, LN-S, PF (Pfizer) (Purple Cap) 2021, 2022    Pneumococcal Conjugate - 13 Valent 2020       Cancer Screening:  PAP: No longer Indicated   MMG: NUTD   Colonoscopy: NUTD   Chest CT scan: NUTD     Objective:   Last 24 Hour Vital Signs:  BP  Min: 131/98  Max: 161/71  Temp  Av.1 °F (36.7 °C)  Min: 98.1 °F (36.7 °C)  Max: 98.1 °F (36.7 °C)  Pulse  Av.8  Min: 74  Max: 83  Resp  Av.1  Min: 18  Max: 30  SpO2  Av.1 %  Min: 93 %  Max: 100 %  Height  Av' 7" (170.2 cm)  Min: " "5' 7" (170.2 cm)  Max: 5' 7" (170.2 cm)  Weight  Av kg (172 lb)  Min: 78 kg (172 lb)  Max: 78 kg (172 lb)  Body mass index is 26.94 kg/m².  No intake/output data recorded.    Physical Examination:  Physical Exam  Constitutional:       General: She is not in acute distress.     Appearance: She is not ill-appearing.   HENT:      Head: Normocephalic and atraumatic.      Right Ear: External ear normal.      Left Ear: External ear normal.      Nose: Nose normal.      Mouth/Throat:      Mouth: Mucous membranes are moist.      Pharynx: Oropharynx is clear.   Eyes:      General:         Right eye: No discharge.         Left eye: No discharge.      Extraocular Movements: Extraocular movements intact.   Cardiovascular:      Rate and Rhythm: Normal rate and regular rhythm.      Pulses: Normal pulses.      Heart sounds: Normal heart sounds. No murmur heard.  Pulmonary:      Comments: Tachypnic, speaking in 4 - 5 words before breaths, mild accessory muscle use. Good air movement to the bases of the lungs with expiratory wheezes present throughout all lung fields, on 2L NC maintain O2 saturations around 93%   Abdominal:      General: Abdomen is flat. Bowel sounds are normal.      Palpations: Abdomen is soft.      Tenderness: There is no abdominal tenderness. There is no guarding or rebound.   Musculoskeletal:      Right lower leg: Edema (1+) present.      Left lower leg: Edema (1+) present.   Skin:     General: Skin is warm and dry.   Neurological:      Mental Status: She is alert and oriented to person, place, and time. Mental status is at baseline.         Laboratory:  Most Recent Data:  CBC:   Lab Results   Component Value Date    WBC 10.26 2024    HGB 13.2 2024    HCT 40.2 2024     2024    MCV 94 2024    RDW 14.7 (H) 2024       BMP:   Lab Results   Component Value Date     2024    K 3.9 2024     2024    CO2 24 2024    BUN 19 2024    " "CREATININE 0.9 08/05/2024     (H) 08/05/2024    CALCIUM 9.2 08/05/2024    MG 2.0 06/28/2023    PHOS 2.8 06/28/2023     LFTs:   Lab Results   Component Value Date    PROT 7.6 08/05/2024    ALBUMIN 3.9 08/05/2024    BILITOT 0.3 08/05/2024    AST 18 08/05/2024    ALKPHOS 103 08/05/2024    ALT 13 08/05/2024     Coags:   Lab Results   Component Value Date    INR 1.0 06/21/2023     FLP:   Lab Results   Component Value Date    CHOL 128 06/23/2023    HDL 38 (L) 06/23/2023    LDLCALC 65.6 06/23/2023    TRIG 122 06/23/2023    CHOLHDL 29.7 06/23/2023     DM:   Lab Results   Component Value Date    HGBA1C 4.6 06/23/2023    LDLCALC 65.6 06/23/2023    CREATININE 0.9 08/05/2024     Thyroid: No results found for: "TSH", "FREET4", "O9ZPADO", "A2WQMLJ", "THYROIDAB"  Anemia:   Lab Results   Component Value Date    PUAMXHLS98 270 06/23/2023    FOLATE 9.8 06/23/2023     Cardiac:   Lab Results   Component Value Date    TROPONINI 0.009 08/05/2024    BNP 16 08/05/2024     Urinalysis:   Lab Results   Component Value Date    LABURIN No growth 06/22/2023    COLORU Yellow 06/22/2023    SPECGRAV >1.030 (A) 06/22/2023    NITRITE Positive (A) 06/22/2023    KETONESU Negative 06/22/2023    WBCUA 27 (H) 06/22/2023       Microbiology Data:  SARS-CoV-2: Negative   Flu: Negative    Other Results:  EKG (my interpretation): NSR with LBBB (seen previously) with upright T waves in V3 - V6 (previously inverted on old EKG) with normal axis.     Radiology:  Imaging Results              X-Ray Chest 1 View (Final result)  Result time 08/06/24 00:12:12      Final result by Emi Taylor MD (08/06/24 00:12:12)                   Impression:      No acute abnormality.      Electronically signed by: Emi Taylor  Date:    08/06/2024  Time:    00:12               Narrative:    EXAMINATION:  XR CHEST 1 VIEW    CLINICAL HISTORY:  Shortness of breath    TECHNIQUE:  Single frontal view of the chest was performed.    COMPARISON:  06/26/2023 chest " x-ray    FINDINGS:  Cardiac silhouette is stable and not significantly enlarged.  The lungs appear clear.  There is no evidence pleural effusion.  Degenerative changes shoulders and spine noted.  Imaged osseous structures otherwise appear intact.                                         Assessment:     Svetlana Beck is a 73 y.o. Female with a PMH of COPD, HTN, HLD, Prior CVA, Takotsubo cardiomyopathy, Combined HF who present for dyspnea, cough, and increased sputum production with new oxygen requirement consistent with a COPD exacerbation.     Plan:     #Acute hypoxemic on chronic hypercapnic respiratory failure 2/2   #COPD Exacerbation   Patient with a reported history of COPD (No PFTs on file) with significant smoking history. Presenting with cardinal symptoms of COPD exacerbation and new oxygen requirements and diffuse wheezing. CXR not consistent with a focal consolidation suggestive of PNA, and BNP WNL making COPD exacerbation most likely cause of new O2 requirement over other etiologies.   - On 2 L NC, O2 saturation goals of 88 - 92%  - Prednisone 40 mg qd for 5 days consistent with GOLD recommendations (s/p 1x methylpred in ED, and a 20 mg prednisone course OP)   - CTX 1g qD for presumed exacerbation. Previously completed a course of azithromycin 4/5 days. Will add day 5/5 azithromycin 250 mg in AM.  - Duonebs q4   - Continue Spiriva   - Will need PFTs OP     #Takotsubo Cardiomyopathy   #Combined HF    Patient with a history of Takotsubo Cardiomyopathy in 2023. Prior TTE in 2020 had shown normal systolic with grade I diastolic dysfunction. Last TTE in 2023 when diagnosed with Takotsubo showed an EF of 18% and Grade I diastolic dysfunction. No FU TTE noted in the chart. On Losartan but no other GDMT   - Continue Losartan 25 mg   - Continue lasix 80 mg qd   - Previously on Metoprolol Succinate 12.5 mg, but was not on active medications from facility paperwork   - Consider repeat TTE and if EF continues to be  reduced, add GDMT as tolerated     #History of R MCA Ischemic Stroke with hemorrhagic conversion  Patient diagnosed with stroke in 2023, thought to be cardioembolic in nature. Was started on anticoagulation and seizure ppx after event and has been maintained on therapies   - Continue eliquis 5 mg BID   - Continue Keppra 50 mg BID   - Continue Oxcarbazepine 150 mg BID   - Continue Atorvastatin 40     #Tobacco Use Disorder   Started smoking around age 17, smokes 1 - 1.25 ppd. Is motivated to quit and understands it's effect on her health.   - Tobacco Cessation Education ordered   - Consider Chantix on discharge   - Patient declined nicotine patch on admission      #HTN   - Continue Amlodipine 10 mg   - Continue Losartan 25 mg     #Dementia   - Continue donepezil 5 mg     Code: Full  Diet: Reg  DVT: Mirella Alexandre MD  [facility] Internal Medicine/Pediatrics PGY-3  U Internal Medicine Team A    [facility] Medicine Hospitalist Pager numbers:   [facility] Hospitalist Medicine Team A (Reed/Pooja): [phone]  [facility] Hospitalist Medicine Team B (Andrzej/Carol):  [phone]

## 2024-08-06 NOTE — ED NOTES
Report received. Pt lying down, respirations even/unlabored. Updated pt on poc. Pt denies any needs. Side rails upx2, call bell within reach. Will continue to monitor

## 2024-08-06 NOTE — PHARMACY MED REC
"  [facility]           Pharmacy  Admission Medication History     The home medication history was taken by Laura Valentin.      Medication history obtained from Medications listed below were obtained from: Nursing home    Based on information gathered for medication list, you may go to "Admission" then "Reconcile Home Medications" tabs to review and/or act upon those items.     The home medication list has been updated by the Pharmacy department.   Please read ALL comments highlighted in yellow.   Please address this information as you see fit.    Feel free to contact us if you have any questions or require assistance.    The medications listed below were removed from the home medication list.  Please reorder if appropriate:    Patient reports NOT TAKING the following medication(s):  Flonase  Folvite 1mg  Claritin 10mg  Toprol xl 25mg      No current facility-administered medications on file prior to encounter.     Current Outpatient Medications on File Prior to Encounter   Medication Sig Dispense Refill    acetaminophen (TYLENOL) 325 MG tablet Take 650 mg by mouth every 4 (four) hours as needed for Pain.      albuterol sulfate 2.5 mg/0.5 mL Nebu Take 2.5 mg by nebulization every 4 (four) hours as needed (wheezing). Rescue      albuterol-budesonide (AIRSUPRA) 90-80 mcg/actuation Inhale 2 puffs into the lungs every 4 (four) hours as needed (wheezing).      amLODIPine (NORVASC) 10 MG tablet Take 10 mg by mouth once daily.      apixaban (ELIQUIS) 5 mg Tab Take 1 tablet (5 mg total) by mouth 2 (two) times daily. 60 tablet 0    atorvastatin (LIPITOR) 40 MG tablet Take 40 mg by mouth once daily.      dicyclomine (BENTYL) 20 mg tablet Take 20 mg by mouth every 8 (eight) hours as needed (abdominal pain).      donepeziL (ARICEPT) 5 MG tablet Take 5 mg by mouth once daily.      furosemide (LASIX) 80 MG tablet Take 80 mg by mouth once daily.      guaiFENesin 100 mg/5 ml (BREANA-TUSSIN) 100 mg/5 mL syrup " Take 200 mg by mouth every 4 (four) hours as needed for Cough.      levETIRAcetam (KEPPRA) 500 MG Tab Take 500 mg by mouth 2 (two) times daily.      loperamide (IMODIUM A-D) 2 mg Tab Take 2 mg by mouth every 8 (eight) hours as needed (diarrhea).      losartan (COZAAR) 25 MG tablet Take 25 mg by mouth once daily.      melatonin (MELATIN) 3 mg tablet Take 6 mg by mouth nightly.      multivitamin (MULTIPLE VITAMINS) per tablet Take 1 tablet by mouth once daily.      naproxen sodium (ALEVE) 220 mg Cap Take 220 mg by mouth every 12 (twelve) hours as needed (pain).      ondansetron (ZOFRAN-ODT) 4 MG TbDL Take 4 mg by mouth every 8 (eight) hours as needed (nausea/vomiting).      OXcarbazepine (TRILEPTAL) 150 MG Tab Take 150 mg by mouth 2 (two) times daily.      oxymetazoline (AFRIN) 0.05 % nasal spray 2 sprays by Nasal route 2 (two) times daily.      predniSONE (DELTASONE) 20 MG tablet Take 20 mg by mouth 2 (two) times daily.      tiotropium bromide (SPIRIVA RESPIMAT) 2.5 mcg/actuation inhaler Inhale 2 puffs into the lungs once daily. Controller 4 g 0       Please address this information as you see fit.  Feel free to contact us if you have any questions or require assistance.    Laura Valentin  [phone]                .

## 2024-08-07 ENCOUNTER — CLINICAL SUPPORT (OUTPATIENT)
Dept: SMOKING CESSATION | Facility: CLINIC | Age: 74
End: 2024-08-07
Payer: MEDICARE

## 2024-08-07 VITALS
RESPIRATION RATE: 18 BRPM | BODY MASS INDEX: 27.89 KG/M2 | TEMPERATURE: 98 F | WEIGHT: 177.69 LBS | HEART RATE: 78 BPM | HEIGHT: 67 IN | DIASTOLIC BLOOD PRESSURE: 62 MMHG | SYSTOLIC BLOOD PRESSURE: 139 MMHG | OXYGEN SATURATION: 96 %

## 2024-08-07 DIAGNOSIS — F17.210 CIGARETTE SMOKER: Primary | ICD-10-CM

## 2024-08-07 PROBLEM — F17.200 TOBACCO DEPENDENCY: Status: ACTIVE | Noted: 2024-08-07

## 2024-08-07 PROCEDURE — 63600175 PHARM REV CODE 636 W HCPCS

## 2024-08-07 PROCEDURE — 25000003 PHARM REV CODE 250

## 2024-08-07 PROCEDURE — 99900035 HC TECH TIME PER 15 MIN (STAT)

## 2024-08-07 PROCEDURE — 94761 N-INVAS EAR/PLS OXIMETRY MLT: CPT

## 2024-08-07 PROCEDURE — 96366 THER/PROPH/DIAG IV INF ADDON: CPT

## 2024-08-07 PROCEDURE — 27000221 HC OXYGEN, UP TO 24 HOURS

## 2024-08-07 PROCEDURE — 94640 AIRWAY INHALATION TREATMENT: CPT

## 2024-08-07 PROCEDURE — 25000242 PHARM REV CODE 250 ALT 637 W/ HCPCS

## 2024-08-07 RX ORDER — IPRATROPIUM BROMIDE AND ALBUTEROL SULFATE 2.5; .5 MG/3ML; MG/3ML
3 SOLUTION RESPIRATORY (INHALATION) EVERY 4 HOURS
Start: 2024-08-07 | End: 2024-08-07

## 2024-08-07 RX ORDER — PREDNISONE 20 MG/1
40 TABLET ORAL DAILY
Start: 2024-08-07 | End: 2024-08-07

## 2024-08-07 RX ORDER — IPRATROPIUM BROMIDE AND ALBUTEROL SULFATE 2.5; .5 MG/3ML; MG/3ML
3 SOLUTION RESPIRATORY (INHALATION)
Start: 2024-08-07 | End: 2024-08-17

## 2024-08-07 RX ORDER — PREDNISONE 20 MG/1
TABLET ORAL
Start: 2024-08-07 | End: 2024-08-19

## 2024-08-07 RX ORDER — IBUPROFEN 200 MG
1 TABLET ORAL DAILY
Start: 2024-08-07

## 2024-08-07 RX ORDER — LEVOFLOXACIN 500 MG/1
500 TABLET, FILM COATED ORAL DAILY
Start: 2024-08-07 | End: 2024-08-11

## 2024-08-07 RX ADMIN — PREDNISONE 40 MG: 20 TABLET ORAL at 09:08

## 2024-08-07 RX ADMIN — TIOTROPIUM BROMIDE INHALATION SPRAY 2 PUFF: 3.12 SPRAY, METERED RESPIRATORY (INHALATION) at 08:08

## 2024-08-07 RX ADMIN — FUROSEMIDE 80 MG: 40 TABLET ORAL at 09:08

## 2024-08-07 RX ADMIN — APIXABAN 5 MG: 5 TABLET, FILM COATED ORAL at 09:08

## 2024-08-07 RX ADMIN — CEFTRIAXONE SODIUM 1 G: 1 INJECTION, POWDER, FOR SOLUTION INTRAMUSCULAR; INTRAVENOUS at 03:08

## 2024-08-07 RX ADMIN — OXCARBAZEPINE 150 MG: 150 TABLET, FILM COATED ORAL at 09:08

## 2024-08-07 RX ADMIN — FLUTICASONE PROPIONATE 50 MCG: 50 SPRAY, METERED NASAL at 09:08

## 2024-08-07 RX ADMIN — IPRATROPIUM BROMIDE AND ALBUTEROL SULFATE 3 ML: 2.5; .5 SOLUTION RESPIRATORY (INHALATION) at 11:08

## 2024-08-07 RX ADMIN — AMLODIPINE BESYLATE 10 MG: 5 TABLET ORAL at 09:08

## 2024-08-07 RX ADMIN — ATORVASTATIN CALCIUM 40 MG: 40 TABLET, FILM COATED ORAL at 09:08

## 2024-08-07 RX ADMIN — IPRATROPIUM BROMIDE AND ALBUTEROL SULFATE 3 ML: 2.5; .5 SOLUTION RESPIRATORY (INHALATION) at 07:08

## 2024-08-07 RX ADMIN — LEVETIRACETAM 500 MG: 500 TABLET, FILM COATED ORAL at 09:08

## 2024-08-07 RX ADMIN — DONEPEZIL HYDROCHLORIDE 5 MG: 5 TABLET, FILM COATED ORAL at 09:08

## 2024-08-07 RX ADMIN — LOSARTAN POTASSIUM 25 MG: 25 TABLET, FILM COATED ORAL at 09:08

## 2024-08-07 NOTE — DISCHARGE SUMMARY
[facility] Discharge Summary    Primary Team: [facility] Team [facility]  Attending Physician: Matt Mcbride MD  Resident: RADHA King  Intern: WING Pena    Date of Admit: 8/5/2024  Date of Discharge: 8/7/2024    Discharge to: Nursing Home  Condition: Improved     Discharge Diagnoses     Patient Active Problem List   Diagnosis    Chronic obstructive pulmonary disease    Chest pain    Smoker    LBBB (left bundle branch block)    Aortic atherosclerosis- seen on CXR 7/11/18    Osteopenia    Takotsubo cardiomyopathy    Encephalopathy    E coli bacteremia    Alcohol abuse, daily use    Acute hypoxic on chronic hypercapnic respiratory failure    Tobacco dependency       Consultants and Procedures     Consultants:  None    Procedures:   None    Brief History of Present Illness      Svetlana Beck is a 73 y.o.  female who  has a past medical history of Acute ischemic right MCA stroke, Asthma, COPD (chronic obstructive pulmonary disease), Dementia, Essential (primary) hypertension, and Takotsubo cardiomyopathy.  The patient presented to [facility] on 8/5/2024 with a primary complaint of Shortness of Breath (X 1 week, been receiving breathing treatments from [facility]. Pt refusing to wear oxygen with hx COPD. )    For the full HPI please refer to the History & Physical from this admission.    Hospital Course By Problem with Pertinent Findings     Acute hypoxemic on chronic hypercapnic respiratory failure 2/2   COPD Exacerbation   Patient with a reported history of COPD (No PFTs on file) with significant smoking history. Presenting with cardinal symptoms of COPD exacerbation and new oxygen requirements and diffuse wheezing. CXR not consistent with a focal consolidation suggestive of PNA, and BNP WNL making COPD exacerbation most likely cause of new O2 requirement over other etiologies.   - On 2 L NC at baseline, O2 saturation goals of 88 - 92%  - Prednisone 40 mg taper for 12 days  - complete antibiotic course with  levofloxacin.  - Duonebs q4   - Continue Spiriva   - Add Pulmicort  - Will need PFTs OP      Takotsubo Cardiomyopathy  HF with recovered EF  Patient with a history of Takotsubo Cardiomyopathy in 2023. Prior TTE in 2020 had shown normal systolic with grade I diastolic dysfunction. Last TTE in 2023 when diagnosed with Takotsubo showed an EF of 18% and Grade I diastolic dysfunction.   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 Was started on anticoagulation and seizure ppx after event and has been maintained on therapies   - Continue Eliquis 5 mg BID   - Continue Keppra 50 mg BID   - Continue Oxcarbazepine 150 mg BID   - Continue Atorvastatin 40      Tobacco Use Disorder   Started smoking around age 17, smokes 1 - 1.25 ppd. Is motivated to quit and understands it's effect on her health. States she is giving away her cigarettes  - Tobacco Cessation Education ordered   - Patient declined nicotine patch on admission       HTN   - Continue Amlodipine 10 mg   - Continue Losartan 25 mg      Dementia   - Continue donepezil 5 mg     Discharge Medications        Medication List        START taking these medications      albuterol-ipratropium 2.5 mg-0.5 mg/3 mL nebulizer solution  Commonly known as: DUO-NEB  Take 3 mLs by nebulization every 6 (six) hours while awake. After exacerbation use as needed for shortness of breath and wheezing for 10 days     budesonide 90 mcg/actuation Aepb  Commonly known as: PULMICORT FLEXHALER  Inhale 2 puffs (180 mcg total) into the lungs 2 (two) times a day. Controller     levoFLOXacin 500 MG tablet  Commonly known as: LEVAQUIN  Take 1 tablet (500 mg total) by mouth once daily. for 4 days     nicotine 14 mg/24 hr  Commonly known as: NICODERM CQ  Place 1 patch onto the skin once daily.             CHANGE how you take these medications      predniSONE 20 MG tablet  Commonly known as: DELTASONE  Take 2 tablets (40 mg total) by mouth once daily for 3 days, THEN 1.5 tablets (30 mg total) once daily for 3 days, THEN 1 tablet (20 mg total) once daily for 3 days, THEN 0.5 tablets (10 mg total) once daily for 3 days.  Start taking on: August 7, 2024  What changed: See the new instructions.            CONTINUE taking these medications      acetaminophen 325 MG tablet  Commonly known as: TYLENOL     albuterol sulfate 2.5 mg/0.5 mL Nebu     albuterol-budesonide 90-80 mcg/actuation  Commonly known as: Airsupra     ALEVE 220 mg Cap  Generic drug: naproxen sodium     amLODIPine 10 MG tablet  Commonly known as: NORVASC     apixaban 5 mg Tab  Commonly known as: ELIQUIS  Take 1 tablet (5 mg total) by mouth 2 (two) times daily.     atorvastatin 40 MG tablet  Commonly known as: LIPITOR     dicyclomine 20 mg tablet  Commonly known as: BENTYL     donepeziL 5 MG tablet  Commonly known as: ARICEPT     furosemide 80 MG tablet  Commonly known as: LASIX     BREANA-TUSSIN 100 mg/5 mL syrup  Generic drug: guaiFENesin 100 mg/5 ml     levETIRAcetam 500 MG Tab  Commonly known as: KEPPRA     loperamide 2 mg Tab  Commonly known as: IMODIUM A-D     losartan 25 MG tablet  Commonly known as: COZAAR     melatonin 3 mg tablet  Commonly known as: MELATIN     MULTIPLE VITAMINS per tablet  Generic drug: multivitamin     ondansetron 4 MG Tbdl  Commonly known as: ZOFRAN-ODT     OXcarbazepine 150 MG Tab  Commonly known as: TRILEPTAL     oxymetazoline 0.05 % nasal spray  Commonly known as: AFRIN     tiotropium bromide 2.5 mcg/actuation inhaler  Commonly known as: SPIRIVA RESPIMAT  Inhale 2 puffs into the lungs once daily. Controller               Where to Get Your Medications        Information about where to get these medications is not yet available    Ask your nurse or doctor about these medications  albuterol-ipratropium 2.5 mg-0.5 mg/3 mL  nebulizer solution  budesonide 90 mcg/actuation Aepb  levoFLOXacin 500 MG tablet  nicotine 14 mg/24 hr  predniSONE 20 MG tablet         Discharge Information:   Diet:  Regular Diet    Physical Activity:  As tolerated, uses wheelchair     Instructions:  1. Take all medications as prescribed  2. Keep all follow-up appointments  3. Return to the hospital or call your primary care physicians if any worsening symptoms such as shortness of breath, increased cough, chest pain, somnolence occur    Follow-Up Appointments:  PCP in 1 week      Follow up items for PCP and tests that have not resulted at time of discharge:   Inhaler regimen, added Pulmicort      Rolanda King  [facility] Internal Medicine/Pediatrics, HO-4

## 2024-08-07 NOTE — PLAN OF CARE
Inpatient Upgrade Note    Svetlana Beck has warranted treatment spanning two or more midnights of hospital level care for the management of COPD exacerbation. She continues to require IV antibiotics, daily labs, supplemental oxygen, monitoring of vital signs, and respiratory therapy / duonebs . Her condition is also complicated by the following comorbidities: Hypertension.     Ronal Alexandre MD  [facility] Internal Medicine/Pediatrics PGY-3  [facility] Internal Medicine Team A

## 2024-08-07 NOTE — PROGRESS NOTES
"[facility] IM/Peds Resident HO-4 Progress Note    Subjective:     Ms. Mota reports feeling better today, she has on her O2 at 1-2 L same as she does at home, has been getting breathing treatments Q4H. Speaking in full sentences. Asks when she can go home.      Objective:   Last 24 Hour Vital Signs:  BP  Min: 117/56  Max: 178/82  Temp  Av.8 °F (36.6 °C)  Min: 97.4 °F (36.3 °C)  Max: 98.5 °F (36.9 °C)  Pulse  Av.5  Min: 59  Max: 92  Resp  Av.3  Min: 18  Max: 24  SpO2  Av %  Min: 92 %  Max: 95 %  Height  Av' 7" (170.2 cm)  Min: 5' 7" (170.2 cm)  Max: 5' 7" (170.2 cm)  Weight  Av.3 kg (174 lb 13.5 oz)  Min: 78 kg (172 lb)  Max: 80.6 kg (177 lb 11.1 oz)  I/O last 3 completed shifts:  In: -   Out: 1750 [Urine:1750]    Physical Examination:  Physical Exam  Constitutional:       General: She is not in acute distress.     Appearance: She is not ill-appearing.   HENT:      Head: Normocephalic and atraumatic.      Right Ear: External ear normal.      Left Ear: External ear normal.      Nose: Nose normal.      Mouth/Throat:      Mouth: Mucous membranes are moist.      Pharynx: Oropharynx is clear.   Eyes:      General:         Right eye: No discharge.         Left eye: No discharge.      Extraocular Movements: Extraocular movements intact.   Cardiovascular:      Rate and Rhythm: Normal rate and regular rhythm.      Pulses: Normal pulses.      Heart sounds: Normal heart sounds. No murmur heard.  Pulmonary:      Comments: airflow improved from yesterday, scattered wheeze on 2L NC maintain O2 saturations around 93%   Abdominal:      General: Abdomen is flat. Bowel sounds are normal.      Palpations: Abdomen is soft.      Tenderness: There is no abdominal tenderness. There is no guarding or rebound.   Musculoskeletal:      Right lower leg: Edema (1+) present.      Left lower leg: Edema (1+) present.   Skin:     General: Skin is warm and dry.   Neurological:      Mental Status: She is alert and oriented to " person, place, and time. Mental status is at baseline.     Laboratory:  Laboratory Data Reviewed: yes  Pertinent Findings:      Microbiology Data Reviewed: yes  Pertinent Findings:      Other Results:  EKG (my interpretation): NSR with LBBB (seen previously) with upright T waves in V3 - V6 (previously inverted on old EKG) with normal axis.      Radiology Data Reviewed: yes  Pertinent Findings:      Current Medications:     Infusions:       Scheduled:   albuterol-ipratropium  3 mL Nebulization Q4H WAKE    amLODIPine  10 mg Oral Daily    apixaban  5 mg Oral BID    atorvastatin  40 mg Oral Daily    cefTRIAXone (Rocephin) IV (PEDS and ADULTS)  1 g Intravenous Q24H    donepeziL  5 mg Oral Daily    fluticasone propionate  1 spray Each Nostril Daily    furosemide  80 mg Oral Daily    levETIRAcetam  500 mg Oral BID    losartan  25 mg Oral Daily    melatonin  6 mg Oral Nightly    OXcarbazepine  150 mg Oral BID    predniSONE  40 mg Oral Daily    tiotropium bromide  2 puff Inhalation Daily        PRN:    Current Facility-Administered Medications:     sodium chloride 0.9%, 10 mL, Intravenous, PRN    Antibiotics and Day Number of Therapy:  Azithro completed day 5 8/6  Ceftriaxone day 2    Lines and Day Number of Therapy:  PIV    Assessment:     Svetlana Beck is a 73 y.o.female with a PMH of COPD, HTN, HLD, Prior CVA, Takotsubo cardiomyopathy, Combined HF who present for dyspnea, cough, and increased sputum production with new oxygen requirement consistent with a COPD exacerbation.     Plan:     #Acute hypoxemic on chronic hypercapnic respiratory failure 2/2   #COPD Exacerbation   Patient with a reported history of COPD (No PFTs on file) with significant smoking history. Presenting with cardinal symptoms of COPD exacerbation and new oxygen requirements and diffuse wheezing. CXR not consistent with a focal consolidation suggestive of PNA, and BNP WNL making COPD exacerbation most likely cause of new O2 requirement over other  etiologies.   - On 2 L NC, O2 saturation goals of 88 - 92%  - Prednisone 40 mg qd for 5 days consistent with GOLD recommendations (s/p 1x methylpred in ED, and a 20 mg prednisone course OP)   - CTX 1g qD for presumed exacerbation. Previously completed a course of azithromycin 4/5 days. Will add day 5/5 azithromycin 250 mg in AM.  - Duonebs q4   - Continue Spiriva   - Will need PFTs OP      #Takotsubo Cardiomyopathy  #HF with recovered EF  Patient with a history of Takotsubo Cardiomyopathy in 2023. Prior TTE in 2020 had shown normal systolic with grade I diastolic dysfunction. Last TTE in 2023 when diagnosed with Takotsubo showed an EF of 18% and Grade I diastolic dysfunction.   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 Was started on anticoagulation and seizure ppx after event and has been maintained on therapies   - Continue eliquis 5 mg BID   - Continue Keppra 50 mg BID   - Continue Oxcarbazepine 150 mg BID   - Continue Atorvastatin 40      #Tobacco Use Disorder   Started smoking around age 17, smokes 1 - 1.25 ppd. Is motivated to quit and understands it's effect on her health. States she is giving away her cigarettes  - Tobacco Cessation Education ordered   - Consider Chantix on discharge   - Patient declined nicotine patch on admission       #HTN   - Continue Amlodipine 10 mg   - Continue Losartan 25 mg      #Dementia   - Continue donepezil 5 mg      Code: Full  Diet: Reg  DVT: Eliquis  Dispo: Return to NH potentially today    Rolanda King  [facility] Internal Medicine/Pediatrics HO-4  U IM Service Team A    [facility] Medicine Hospitalist Pager numbers:   [facility] Hospitalist Medicine Team A  (Reed/Pooja): [phone]  [facility] Hospitalist Medicine Team B (Andrzej/Carol):  [phone]

## 2024-08-07 NOTE — PLAN OF CARE
Problem: Fall Injury Risk  Goal: Absence of Fall and Fall-Related Injury  Outcome: Progressing  Intervention: Identify and Manage Contributors  Flowsheets (Taken 8/7/2024 1519)  Self-Care Promotion:   independence encouraged   meal set-up provided   BADL personal objects within reach  Medication Review/Management:   medications reviewed   high-risk medications identified     Problem: Comorbidity Management  Goal: Maintenance of COPD Symptom Control  Outcome: Progressing  Intervention: Maintain COPD Symptom Control  Flowsheets (Taken 8/7/2024 1519)  Medication Review/Management:   medications reviewed   high-risk medications identified  Patient is returning to NH, report given to nurse Teresa. Removed telemetry and IV, catheter intact. Patient cleaned up and  diaper on patient for the ride only,  and nurse notified. Nursing home  picked patient up to transport back to the facility.

## 2024-08-07 NOTE — PLAN OF CARE
ORQUIDEA met with at bedside this AM to complete DCA this AM. Pt reported 0/10 pain and was in a pleasant mood throughout assessment. Pt reported that she lives at [facility] Phone: [phone], ORQUIDEA sent return referral via [employer] and informed NH that pt would return. Per pt she has a wc at the facility that she will use to complete ADLs. White board updated with CM name and contact information.  Discharge brochure provided.  Pt encouraged to call with any questions or concerns.  Cm will continue to follow pt through transitions of care and assist with any discharge needs.    JIM Damico  [phone]     08/07/24 1033   Discharge Assessment   Assessment Type Discharge Planning Assessment   Confirmed/corrected address, phone number and insurance Yes   Confirmed Demographics Correct on Facesheet   Source of Information patient   When was your last doctors appointment?   (per pt it has been a while)   Communicated FATOU with patient/caregiver Yes   Reason For Admission Acute hypoxic on chronic hypercapnic respiratory failure   People in Home facility resident   Facility Arrived From: [facility] Phone: [phone]   Do you expect to return to your current living situation? Yes   Do you have help at home or someone to help you manage your care at home? Yes   Who are your caregiver(s) and their phone number(s)? [facility] Phone: [phone]   Prior to hospitilization cognitive status: Alert/Oriented   Current cognitive status: Alert/Oriented   Walking or Climbing Stairs Difficulty no   Dressing/Bathing Difficulty no   Do you have any problems with: Errands/Grocery   Home Accessibility wheelchair accessible   Home Layout Able to live on 1st floor   Equipment Currently Used at Home wheelchair   Readmission within 30 days? No   Patient currently being followed by outpatient case management? No   Do you currently have service(s) that  help you manage your care at home? Yes   Is the pt/caregiver preference to resume services with current agency Yes   Do you take prescription medications? Yes   Do you have prescription coverage? Yes   Coverage PHN   Do you have any problems affording any of your prescribed medications? Yes   Is the patient taking medications as prescribed? yes   Who is going to help you get home at discharge? [facility] Phone: [phone]   Are you on dialysis? No   Do you take coumadin? No   Discharge Plan A Return to nursing home   DME Needed Upon Discharge  none   Discharge Plan discussed with: Patient   Transition of Care Barriers None   OTHER   Name(s) of People in Home [facility] Phone: [phone]

## 2024-08-07 NOTE — PLAN OF CARE
Patient on oxygen with documented flow.  Tx as scheduled, NAD @ this time, will continue to monitor.

## 2024-08-07 NOTE — PLAN OF CARE
[facility]    FACILITY TRANSFER ORDERS      Patient Name: Svetlana Beck  YOB: 1950    PCP: Elida Lara DO   PCP Address: [address] [place] [address] / [place] [address]  PCP Phone Number: [phone]  PCP Fax: [phone]    Encounter Date: 08/07/2024    Admit to: [facility] (Pt is currently a resident)    Vital Signs:  Routine    Diagnoses:   Active Hospital Problems    Diagnosis  POA    *Acute hypoxic on chronic hypercapnic respiratory failure [J96.01, J96.12]  Yes    Tobacco dependency [F17.200]  Unknown    Chronic obstructive pulmonary disease [J44.9]  Yes     Chronic      Resolved Hospital Problems   No resolved problems to display.       Allergies:  Review of patient's allergies indicates:   Allergen Reactions    Penicillins Other (See Comments)       Diet: regular diet    Activities: Activity as tolerated    Goals of Care Treatment Preferences:  Code Status: Full Code    Nursing:     - DuoNeb treatments 3x a day while she is on the Prednisone  - Prednisone 40mg 3days, 30mg 3 days, 20mg 3days, 10mg 3days  - Levofloxacin 8/7-8/12   - O2 via NC PRN 1-2L for SpO2>90    Labs: none    CONSULTS:    Physical Therapy to evaluate and treat.  and Occupational Therapy to evaluate and treat.    MISCELLANEOUS CARE:       WOUND CARE ORDERS  None    Medications: Review discharge medications with patient and family and provide education.    - DuoNeb treatments 3x a day while she is on the Prednisone  - Prednisone 40mg 3days, 30mg 3 days, 20mg 3days, 10mg 3days  - Levofloxacin 8/7-8/12   - 1L NC PRN for O2 >90       Medication List        START taking these medications      albuterol-ipratropium 2.5 mg-0.5 mg/3 mL nebulizer solution  Commonly known as: DUO-NEB  Take 3 mLs by nebulization every 6 (six) hours while awake. After exacerbation use as needed for shortness of breath and wheezing for 10 days     budesonide 90 mcg/actuation Aepb  Commonly known as: PULMICORT  FLEXHALER  Inhale 2 puffs (180 mcg total) into the lungs 2 (two) times a day. Controller     levoFLOXacin 500 MG tablet  Commonly known as: LEVAQUIN  Take 1 tablet (500 mg total) by mouth once daily. for 4 days            CHANGE how you take these medications      predniSONE 20 MG tablet  Commonly known as: DELTASONE  Take 2 tablets (40 mg total) by mouth once daily for 3 days, THEN 1.5 tablets (30 mg total) once daily for 3 days, THEN 1 tablet (20 mg total) once daily for 3 days, THEN 0.5 tablets (10 mg total) once daily for 3 days.  Start taking on: August 7, 2024  What changed: See the new instructions.            CONTINUE taking these medications      acetaminophen 325 MG tablet  Commonly known as: TYLENOL  Take 650 mg by mouth every 4 (four) hours as needed for Pain.     albuterol sulfate 2.5 mg/0.5 mL Nebu  Take 2.5 mg by nebulization every 4 (four) hours as needed (wheezing). Rescue     albuterol-budesonide 90-80 mcg/actuation  Commonly known as: Airsupra  Inhale 2 puffs into the lungs every 4 (four) hours as needed (wheezing).     ALEVE 220 mg Cap  Generic drug: naproxen sodium  Take 220 mg by mouth every 12 (twelve) hours as needed (pain).     amLODIPine 10 MG tablet  Commonly known as: NORVASC  Take 10 mg by mouth once daily.     apixaban 5 mg Tab  Commonly known as: ELIQUIS  Take 1 tablet (5 mg total) by mouth 2 (two) times daily.     atorvastatin 40 MG tablet  Commonly known as: LIPITOR  Take 40 mg by mouth once daily.     dicyclomine 20 mg tablet  Commonly known as: BENTYL  Take 20 mg by mouth every 8 (eight) hours as needed (abdominal pain).     donepeziL 5 MG tablet  Commonly known as: ARICEPT  Take 5 mg by mouth once daily.     furosemide 80 MG tablet  Commonly known as: LASIX  Take 80 mg by mouth once daily.     BREANA-TUSSIN 100 mg/5 mL syrup  Generic drug: guaiFENesin 100 mg/5 ml  Take 200 mg by mouth every 4 (four) hours as needed for Cough.     levETIRAcetam 500 MG Tab  Commonly known as:  KEPPRA  Take 500 mg by mouth 2 (two) times daily.     loperamide 2 mg Tab  Commonly known as: IMODIUM A-D  Take 2 mg by mouth every 8 (eight) hours as needed (diarrhea).     losartan 25 MG tablet  Commonly known as: COZAAR  Take 25 mg by mouth once daily.     melatonin 3 mg tablet  Commonly known as: MELATIN  Take 6 mg by mouth nightly.     MULTIPLE VITAMINS per tablet  Generic drug: multivitamin  Take 1 tablet by mouth once daily.     ondansetron 4 MG Tbdl  Commonly known as: ZOFRAN-ODT  Take 4 mg by mouth every 8 (eight) hours as needed (nausea/vomiting).     OXcarbazepine 150 MG Tab  Commonly known as: TRILEPTAL  Take 150 mg by mouth 2 (two) times daily.     oxymetazoline 0.05 % nasal spray  Commonly known as: AFRIN  2 sprays by Nasal route 2 (two) times daily.     tiotropium bromide 2.5 mcg/actuation inhaler  Commonly known as: SPIRIVA RESPIMAT  Inhale 2 puffs into the lungs once daily. Controller                Immunizations Administered as of 8/7/2024       Name Date Dose VIS Date Route Exp Date    COVID-19, MRNA, LN-S, PF (Pfizer) (Purple Cap) 1/13/2022 0.3 mL 5/10/2021 Intramuscular --    Site: Left arm     : [employer]     Lot: [id number]     Comment: Adminis     COVID-19, MRNA, LN-S, PF (Pfizer) (Purple Cap) 12/23/2021 0.3 mL 5/10/2021 Intramuscular --    Site: Left arm     : [employer]     Lot: [id number]     Comment: Adminis             This patient has had both covid vaccinations    Some patients may experience side effects after vaccination.  These may include fever, headache, muscle or joint aches.  Most symptoms resolve with 24-48 hours and do not require urgent medical evaluation unless they persist for more than 72 hours or symptoms are concerning for an unrelated medical condition.          _________________________________  Rolanda King MD  08/07/2024

## 2024-08-07 NOTE — PLAN OF CARE
SW sent facility transfer orders via careport to [facility] Phone: [phone]. SW called and left a VM requesting a return call for pt's NH. Sw will follow.    SW spoke with the NH they will be sending their transport to collect the pt today. Report can be called to the back be nurse at  [phone]. SW attempted to call pt's sister to inform her of pending d/c there was no answer sw left a VM requesting a return call. Rounds completed on pt. All questions addressed. Bedside nurse to discuss d/c medications. Discussed importance to attend all f/u appts and take medications as prescribed. Verbalized understanding. Case Management to sign off.     JIM Damico  [phone]    Future Appointments   Date Time Provider Department Center   8/20/2024 10:00 AM Tushar Whitmore, [facility]        08/07/24 1314   Final Note   Assessment Type Final Discharge Note   Anticipated Discharge Disposition [facility]   Post-Acute Status   Coverage PHN   Discharge Delays None known at this time

## 2024-08-10 NOTE — PHYSICIAN QUERY
Due to the conflicting clinical picture, please clinically validate the Acute hypoxemic on chronic hypercapnic respiratory failure . If validated, please provide additional clinical support for the diagnosis.  The respiratory condition is confirmed.  Additional clinical support/decision making indicators for the diagnosis include medical history and physical examination.

## 2024-08-21 ENCOUNTER — TELEPHONE (OUTPATIENT)
Dept: SMOKING CESSATION | Facility: CLINIC | Age: 74
End: 2024-08-21
Payer: MEDICARE

## (undated) DEVICE — SPIKE CONTRAST CONTROLLER

## (undated) DEVICE — KIT GLIDESHEATH SLEND 6FR 10CM

## (undated) DEVICE — HEMOSTAT VASC BAND REG 24CM

## (undated) DEVICE — GUIDEWIRE SUPRA CORE 035 190CM

## (undated) DEVICE — KIT CUSTOM MANIFOLD

## (undated) DEVICE — DRAPE ANGIO BRACH 38X44IN

## (undated) DEVICE — OMNIPAQUE 350 200ML

## (undated) DEVICE — TRANSDUCER ADULT DISP

## (undated) DEVICE — PROTECTION STATION PLUS

## (undated) DEVICE — TRAY CATH LAB OMC

## (undated) DEVICE — CATH JACKY RADIAL 5FR 100CM